# Patient Record
Sex: MALE | Race: WHITE | NOT HISPANIC OR LATINO | URBAN - METROPOLITAN AREA
[De-identification: names, ages, dates, MRNs, and addresses within clinical notes are randomized per-mention and may not be internally consistent; named-entity substitution may affect disease eponyms.]

---

## 2019-01-01 ENCOUNTER — ANESTHESIA EVENT (OUTPATIENT)
Dept: ENDOSCOPY | Facility: HOSPITAL | Age: 54
DRG: 374 | End: 2019-01-01
Payer: MEDICAID

## 2019-01-01 ENCOUNTER — ANESTHESIA (OUTPATIENT)
Dept: ENDOSCOPY | Facility: HOSPITAL | Age: 54
DRG: 374 | End: 2019-01-01
Payer: MEDICAID

## 2019-01-01 ENCOUNTER — PATIENT MESSAGE (OUTPATIENT)
Dept: HEMATOLOGY/ONCOLOGY | Facility: CLINIC | Age: 54
End: 2019-01-01

## 2019-01-01 ENCOUNTER — TELEPHONE (OUTPATIENT)
Dept: INTERVENTIONAL RADIOLOGY/VASCULAR | Facility: HOSPITAL | Age: 54
End: 2019-01-01

## 2019-01-01 ENCOUNTER — HOSPITAL ENCOUNTER (INPATIENT)
Facility: HOSPITAL | Age: 54
LOS: 7 days | Discharge: HOME | DRG: 420 | End: 2019-06-27
Attending: EMERGENCY MEDICINE | Admitting: EMERGENCY MEDICINE
Payer: MEDICAID

## 2019-01-01 ENCOUNTER — OFFICE VISIT (OUTPATIENT)
Dept: HEMATOLOGY/ONCOLOGY | Facility: CLINIC | Age: 54
DRG: 374 | End: 2019-01-01
Payer: MEDICAID

## 2019-01-01 ENCOUNTER — TELEPHONE (OUTPATIENT)
Dept: HEMATOLOGY/ONCOLOGY | Facility: CLINIC | Age: 54
End: 2019-01-01

## 2019-01-01 ENCOUNTER — HOSPITAL ENCOUNTER (INPATIENT)
Facility: HOSPITAL | Age: 54
LOS: 3 days | DRG: 374 | End: 2019-07-05
Attending: EMERGENCY MEDICINE | Admitting: HOSPITALIST
Payer: MEDICAID

## 2019-01-01 ENCOUNTER — TELEPHONE (OUTPATIENT)
Dept: TRANSPLANT | Facility: CLINIC | Age: 54
End: 2019-01-01

## 2019-01-01 VITALS
WEIGHT: 178.81 LBS | BODY MASS INDEX: 26.48 KG/M2 | HEART RATE: 87 BPM | HEIGHT: 69 IN | SYSTOLIC BLOOD PRESSURE: 110 MMHG | TEMPERATURE: 98 F | RESPIRATION RATE: 18 BRPM | DIASTOLIC BLOOD PRESSURE: 68 MMHG

## 2019-01-01 VITALS
BODY MASS INDEX: 24.52 KG/M2 | HEART RATE: 82 BPM | OXYGEN SATURATION: 96 % | WEIGHT: 165.56 LBS | DIASTOLIC BLOOD PRESSURE: 53 MMHG | TEMPERATURE: 98 F | SYSTOLIC BLOOD PRESSURE: 97 MMHG | RESPIRATION RATE: 17 BRPM | HEIGHT: 69 IN

## 2019-01-01 VITALS
BODY MASS INDEX: 26.42 KG/M2 | OXYGEN SATURATION: 90 % | DIASTOLIC BLOOD PRESSURE: 50 MMHG | HEIGHT: 69 IN | HEART RATE: 71 BPM | TEMPERATURE: 91 F | WEIGHT: 178.38 LBS | RESPIRATION RATE: 26 BRPM | SYSTOLIC BLOOD PRESSURE: 72 MMHG

## 2019-01-01 DIAGNOSIS — G89.3 NEOPLASM RELATED PAIN: ICD-10-CM

## 2019-01-01 DIAGNOSIS — C78.7 LIVER METASTASES: ICD-10-CM

## 2019-01-01 DIAGNOSIS — K92.0 HEMATEMESIS WITH NAUSEA: ICD-10-CM

## 2019-01-01 DIAGNOSIS — D50.9 MICROCYTIC ANEMIA: ICD-10-CM

## 2019-01-01 DIAGNOSIS — D72.829 LEUKOCYTOSIS, UNSPECIFIED TYPE: ICD-10-CM

## 2019-01-01 DIAGNOSIS — C22.1 CHOLANGIOCARCINOMA: ICD-10-CM

## 2019-01-01 DIAGNOSIS — E87.5 ACUTE HYPERKALEMIA: ICD-10-CM

## 2019-01-01 DIAGNOSIS — K92.0 HEMATEMESIS: Primary | ICD-10-CM

## 2019-01-01 DIAGNOSIS — R74.8 ELEVATED LIVER ENZYMES: ICD-10-CM

## 2019-01-01 DIAGNOSIS — C22.0 HCC (HEPATOCELLULAR CARCINOMA): Primary | ICD-10-CM

## 2019-01-01 DIAGNOSIS — I95.9 HYPOTENSION: ICD-10-CM

## 2019-01-01 DIAGNOSIS — R16.0 LIVER MASSES: ICD-10-CM

## 2019-01-01 DIAGNOSIS — J96.01 ACUTE HYPOXEMIC RESPIRATORY FAILURE: ICD-10-CM

## 2019-01-01 DIAGNOSIS — N17.9 AKI (ACUTE KIDNEY INJURY): ICD-10-CM

## 2019-01-01 DIAGNOSIS — C22.0 HCC (HEPATOCELLULAR CARCINOMA): ICD-10-CM

## 2019-01-01 DIAGNOSIS — R79.89 ELEVATED LFTS: ICD-10-CM

## 2019-01-01 DIAGNOSIS — E87.20 METABOLIC ACIDOSIS: ICD-10-CM

## 2019-01-01 DIAGNOSIS — A41.50 SEPSIS DUE TO GRAM NEGATIVE BACTERIA: ICD-10-CM

## 2019-01-01 DIAGNOSIS — C79.9 METASTATIC ADENOCARCINOMA: ICD-10-CM

## 2019-01-01 DIAGNOSIS — E88.09 HYPOALBUMINEMIA: ICD-10-CM

## 2019-01-01 DIAGNOSIS — K75.9 HEPATITIS: ICD-10-CM

## 2019-01-01 DIAGNOSIS — E80.6 HYPERBILIRUBINEMIA: ICD-10-CM

## 2019-01-01 DIAGNOSIS — D50.9 IRON DEFICIENCY ANEMIA, UNSPECIFIED IRON DEFICIENCY ANEMIA TYPE: ICD-10-CM

## 2019-01-01 DIAGNOSIS — E87.5 HYPERKALEMIA: ICD-10-CM

## 2019-01-01 DIAGNOSIS — C80.1 ADENOCARCINOMA: ICD-10-CM

## 2019-01-01 DIAGNOSIS — C79.9 METASTATIC CANCER: Primary | ICD-10-CM

## 2019-01-01 DIAGNOSIS — K59.01 SLOW TRANSIT CONSTIPATION: ICD-10-CM

## 2019-01-01 DIAGNOSIS — R57.9 SHOCK: ICD-10-CM

## 2019-01-01 DIAGNOSIS — E79.0 HYPERURICEMIA: ICD-10-CM

## 2019-01-01 DIAGNOSIS — E44.1 MILD PROTEIN MALNUTRITION: ICD-10-CM

## 2019-01-01 DIAGNOSIS — C18.9 ADENOCARCINOMA OF COLON: ICD-10-CM

## 2019-01-01 DIAGNOSIS — R06.02 SOB (SHORTNESS OF BREATH): ICD-10-CM

## 2019-01-01 DIAGNOSIS — G93.41 ACUTE METABOLIC ENCEPHALOPATHY: ICD-10-CM

## 2019-01-01 DIAGNOSIS — D62 ANEMIA DUE TO BLOOD LOSS, ACUTE: ICD-10-CM

## 2019-01-01 DIAGNOSIS — D62 ACUTE POST-HEMORRHAGIC ANEMIA: ICD-10-CM

## 2019-01-01 LAB
ABO + RH BLD: NORMAL
AFP SERPL-MCNC: 974 NG/ML (ref 0–8.4)
ALBUMIN SERPL BCP-MCNC: 1.3 G/DL (ref 3.5–5.2)
ALBUMIN SERPL BCP-MCNC: 1.3 G/DL (ref 3.5–5.2)
ALBUMIN SERPL BCP-MCNC: 1.4 G/DL (ref 3.5–5.2)
ALBUMIN SERPL BCP-MCNC: 1.5 G/DL (ref 3.5–5.2)
ALBUMIN SERPL BCP-MCNC: 2.1 G/DL (ref 3.5–5.2)
ALBUMIN SERPL BCP-MCNC: 2.2 G/DL (ref 3.5–5.2)
ALBUMIN SERPL BCP-MCNC: 2.2 G/DL (ref 3.5–5.2)
ALBUMIN SERPL BCP-MCNC: 2.3 G/DL (ref 3.5–5.2)
ALBUMIN SERPL BCP-MCNC: 2.4 G/DL (ref 3.5–5.2)
ALBUMIN SERPL BCP-MCNC: 2.5 G/DL (ref 3.5–5.2)
ALBUMIN SERPL BCP-MCNC: 2.7 G/DL (ref 3.5–5.2)
ALLENS TEST: ABNORMAL
ALP SERPL-CCNC: 1023 U/L (ref 55–135)
ALP SERPL-CCNC: 449 U/L (ref 55–135)
ALP SERPL-CCNC: 456 U/L (ref 55–135)
ALP SERPL-CCNC: 467 U/L (ref 55–135)
ALP SERPL-CCNC: 474 U/L (ref 55–135)
ALP SERPL-CCNC: 480 U/L (ref 55–135)
ALP SERPL-CCNC: 509 U/L (ref 55–135)
ALP SERPL-CCNC: 519 U/L (ref 55–135)
ALP SERPL-CCNC: 520 U/L (ref 55–135)
ALP SERPL-CCNC: 525 U/L (ref 55–135)
ALP SERPL-CCNC: 550 U/L (ref 55–135)
ALP SERPL-CCNC: 643 U/L (ref 55–135)
ALP SERPL-CCNC: 659 U/L (ref 55–135)
ALP SERPL-CCNC: 766 U/L (ref 55–135)
ALT SERPL W/O P-5'-P-CCNC: 114 U/L (ref 10–44)
ALT SERPL W/O P-5'-P-CCNC: 1158 U/L (ref 10–44)
ALT SERPL W/O P-5'-P-CCNC: 119 U/L (ref 10–44)
ALT SERPL W/O P-5'-P-CCNC: 124 U/L (ref 10–44)
ALT SERPL W/O P-5'-P-CCNC: 131 U/L (ref 10–44)
ALT SERPL W/O P-5'-P-CCNC: 1482 U/L (ref 10–44)
ALT SERPL W/O P-5'-P-CCNC: 181 U/L (ref 10–44)
ALT SERPL W/O P-5'-P-CCNC: 1903 U/L (ref 10–44)
ALT SERPL W/O P-5'-P-CCNC: 2125 U/L (ref 10–44)
ALT SERPL W/O P-5'-P-CCNC: 2255 U/L (ref 10–44)
ALT SERPL W/O P-5'-P-CCNC: 370 U/L (ref 10–44)
ALT SERPL W/O P-5'-P-CCNC: 432 U/L (ref 10–44)
ALT SERPL W/O P-5'-P-CCNC: 504 U/L (ref 10–44)
ALT SERPL W/O P-5'-P-CCNC: 506 U/L (ref 10–44)
AMMONIA PLAS-SCNC: 279 UMOL/L (ref 10–50)
ANION GAP SERPL CALC-SCNC: 10 MMOL/L (ref 8–16)
ANION GAP SERPL CALC-SCNC: 15 MMOL/L (ref 8–16)
ANION GAP SERPL CALC-SCNC: 15 MMOL/L (ref 8–16)
ANION GAP SERPL CALC-SCNC: 21 MMOL/L (ref 8–16)
ANION GAP SERPL CALC-SCNC: 22 MMOL/L (ref 8–16)
ANION GAP SERPL CALC-SCNC: 23 MMOL/L (ref 8–16)
ANION GAP SERPL CALC-SCNC: 24 MMOL/L (ref 8–16)
ANION GAP SERPL CALC-SCNC: 25 MMOL/L (ref 8–16)
ANION GAP SERPL CALC-SCNC: 7 MMOL/L (ref 8–16)
ANION GAP SERPL CALC-SCNC: 8 MMOL/L (ref 8–16)
ANION GAP SERPL CALC-SCNC: 9 MMOL/L (ref 8–16)
ANISOCYTOSIS BLD QL SMEAR: SLIGHT
AST SERPL-CCNC: 134 U/L (ref 10–40)
AST SERPL-CCNC: 140 U/L (ref 10–40)
AST SERPL-CCNC: 141 U/L (ref 10–40)
AST SERPL-CCNC: 141 U/L (ref 10–40)
AST SERPL-CCNC: 221 U/L (ref 10–40)
AST SERPL-CCNC: 3860 U/L (ref 10–40)
AST SERPL-CCNC: 5421 U/L (ref 10–40)
AST SERPL-CCNC: 573 U/L (ref 10–40)
AST SERPL-CCNC: 574 U/L (ref 10–40)
AST SERPL-CCNC: 592 U/L (ref 10–40)
AST SERPL-CCNC: 7422 U/L (ref 10–40)
AST SERPL-CCNC: 773 U/L (ref 10–40)
AST SERPL-CCNC: 8244 U/L (ref 10–40)
AST SERPL-CCNC: 9113 U/L (ref 10–40)
BACTERIA #/AREA URNS AUTO: ABNORMAL /HPF
BACTERIA #/AREA URNS AUTO: NORMAL /HPF
BACTERIA BLD CULT: NORMAL
BASOPHILS # BLD AUTO: 0.01 K/UL (ref 0–0.2)
BASOPHILS # BLD AUTO: 0.01 K/UL (ref 0–0.2)
BASOPHILS # BLD AUTO: 0.03 K/UL (ref 0–0.2)
BASOPHILS # BLD AUTO: 0.04 K/UL (ref 0–0.2)
BASOPHILS # BLD AUTO: 0.05 K/UL (ref 0–0.2)
BASOPHILS # BLD AUTO: 0.06 K/UL (ref 0–0.2)
BASOPHILS # BLD AUTO: 0.07 K/UL (ref 0–0.2)
BASOPHILS NFR BLD: 0.1 % (ref 0–1.9)
BASOPHILS NFR BLD: 0.1 % (ref 0–1.9)
BASOPHILS NFR BLD: 0.2 % (ref 0–1.9)
BASOPHILS NFR BLD: 0.3 % (ref 0–1.9)
BASOPHILS NFR BLD: 0.4 % (ref 0–1.9)
BASOPHILS NFR BLD: 0.5 % (ref 0–1.9)
BILIRUB DIRECT SERPL-MCNC: 4 MG/DL (ref 0.1–0.3)
BILIRUB DIRECT SERPL-MCNC: 4.5 MG/DL (ref 0.1–0.3)
BILIRUB DIRECT SERPL-MCNC: 4.5 MG/DL (ref 0.1–0.3)
BILIRUB DIRECT SERPL-MCNC: 4.6 MG/DL (ref 0.1–0.3)
BILIRUB SERPL-MCNC: 0.7 MG/DL (ref 0.1–1)
BILIRUB SERPL-MCNC: 0.9 MG/DL (ref 0.1–1)
BILIRUB SERPL-MCNC: 0.9 MG/DL (ref 0.1–1)
BILIRUB SERPL-MCNC: 1 MG/DL (ref 0.1–1)
BILIRUB SERPL-MCNC: 1.1 MG/DL (ref 0.1–1)
BILIRUB SERPL-MCNC: 1.4 MG/DL (ref 0.1–1)
BILIRUB SERPL-MCNC: 4.7 MG/DL (ref 0.1–1)
BILIRUB SERPL-MCNC: 5.2 MG/DL (ref 0.1–1)
BILIRUB SERPL-MCNC: 5.8 MG/DL (ref 0.1–1)
BILIRUB SERPL-MCNC: 6.4 MG/DL (ref 0.1–1)
BILIRUB SERPL-MCNC: 6.8 MG/DL (ref 0.1–1)
BILIRUB SERPL-MCNC: 7.2 MG/DL (ref 0.1–1)
BILIRUB UR QL STRIP: NEGATIVE
BLD GP AB SCN CELLS X3 SERPL QL: NORMAL
BLD PROD TYP BPU: NORMAL
BLOOD UNIT EXPIRATION DATE: NORMAL
BLOOD UNIT TYPE CODE: 7300
BLOOD UNIT TYPE: NORMAL
BNP SERPL-MCNC: 97 PG/ML (ref 0–99)
BUN SERPL-MCNC: 10 MG/DL (ref 6–20)
BUN SERPL-MCNC: 13 MG/DL (ref 6–20)
BUN SERPL-MCNC: 14 MG/DL (ref 6–20)
BUN SERPL-MCNC: 20 MG/DL (ref 6–20)
BUN SERPL-MCNC: 31 MG/DL (ref 6–20)
BUN SERPL-MCNC: 33 MG/DL (ref 6–20)
BUN SERPL-MCNC: 39 MG/DL (ref 6–20)
BUN SERPL-MCNC: 40 MG/DL (ref 6–20)
BUN SERPL-MCNC: 45 MG/DL (ref 6–20)
BUN SERPL-MCNC: 45 MG/DL (ref 6–20)
BUN SERPL-MCNC: 46 MG/DL (ref 6–20)
BUN SERPL-MCNC: 48 MG/DL (ref 6–20)
BUN SERPL-MCNC: 48 MG/DL (ref 6–20)
BUN SERPL-MCNC: 9 MG/DL (ref 6–20)
BURR CELLS BLD QL SMEAR: ABNORMAL
CALCIUM SERPL-MCNC: 7.1 MG/DL (ref 8.7–10.5)
CALCIUM SERPL-MCNC: 7.3 MG/DL (ref 8.7–10.5)
CALCIUM SERPL-MCNC: 7.4 MG/DL (ref 8.7–10.5)
CALCIUM SERPL-MCNC: 7.7 MG/DL (ref 8.7–10.5)
CALCIUM SERPL-MCNC: 7.7 MG/DL (ref 8.7–10.5)
CALCIUM SERPL-MCNC: 7.9 MG/DL (ref 8.7–10.5)
CALCIUM SERPL-MCNC: 7.9 MG/DL (ref 8.7–10.5)
CALCIUM SERPL-MCNC: 8.1 MG/DL (ref 8.7–10.5)
CALCIUM SERPL-MCNC: 8.3 MG/DL (ref 8.7–10.5)
CALCIUM SERPL-MCNC: 8.3 MG/DL (ref 8.7–10.5)
CALCIUM SERPL-MCNC: 8.4 MG/DL (ref 8.7–10.5)
CALCIUM SERPL-MCNC: 8.4 MG/DL (ref 8.7–10.5)
CALCIUM SERPL-MCNC: 8.5 MG/DL (ref 8.7–10.5)
CALCIUM SERPL-MCNC: 8.5 MG/DL (ref 8.7–10.5)
CALCIUM SERPL-MCNC: 8.6 MG/DL (ref 8.7–10.5)
CALCIUM SERPL-MCNC: 8.9 MG/DL (ref 8.7–10.5)
CALCIUM SERPL-MCNC: 9 MG/DL (ref 8.7–10.5)
CANCER AG19-9 SERPL-ACNC: ABNORMAL U/ML (ref 2–40)
CEA SERPL-MCNC: 158.3 NG/ML (ref 0–5)
CHLORIDE SERPL-SCNC: 100 MMOL/L (ref 95–110)
CHLORIDE SERPL-SCNC: 100 MMOL/L (ref 95–110)
CHLORIDE SERPL-SCNC: 101 MMOL/L (ref 95–110)
CHLORIDE SERPL-SCNC: 101 MMOL/L (ref 95–110)
CHLORIDE SERPL-SCNC: 102 MMOL/L (ref 95–110)
CHLORIDE SERPL-SCNC: 103 MMOL/L (ref 95–110)
CHLORIDE SERPL-SCNC: 103 MMOL/L (ref 95–110)
CHLORIDE SERPL-SCNC: 107 MMOL/L (ref 95–110)
CHLORIDE SERPL-SCNC: 90 MMOL/L (ref 95–110)
CHLORIDE SERPL-SCNC: 90 MMOL/L (ref 95–110)
CHLORIDE SERPL-SCNC: 92 MMOL/L (ref 95–110)
CHLORIDE SERPL-SCNC: 93 MMOL/L (ref 95–110)
CHLORIDE SERPL-SCNC: 94 MMOL/L (ref 95–110)
CHLORIDE SERPL-SCNC: 94 MMOL/L (ref 95–110)
CHLORIDE SERPL-SCNC: 96 MMOL/L (ref 95–110)
CHLORIDE SERPL-SCNC: 96 MMOL/L (ref 95–110)
CHLORIDE SERPL-SCNC: 97 MMOL/L (ref 95–110)
CK SERPL-CCNC: 982 U/L (ref 20–200)
CLARITY UR REFRACT.AUTO: ABNORMAL
CLARITY UR REFRACT.AUTO: ABNORMAL
CLARITY UR REFRACT.AUTO: CLEAR
CLARITY UR REFRACT.AUTO: CLEAR
CO2 SERPL-SCNC: 10 MMOL/L (ref 23–29)
CO2 SERPL-SCNC: 11 MMOL/L (ref 23–29)
CO2 SERPL-SCNC: 13 MMOL/L (ref 23–29)
CO2 SERPL-SCNC: 14 MMOL/L (ref 23–29)
CO2 SERPL-SCNC: 19 MMOL/L (ref 23–29)
CO2 SERPL-SCNC: 23 MMOL/L (ref 23–29)
CO2 SERPL-SCNC: 23 MMOL/L (ref 23–29)
CO2 SERPL-SCNC: 24 MMOL/L (ref 23–29)
CO2 SERPL-SCNC: 24 MMOL/L (ref 23–29)
CO2 SERPL-SCNC: 25 MMOL/L (ref 23–29)
CO2 SERPL-SCNC: 27 MMOL/L (ref 23–29)
CO2 SERPL-SCNC: 27 MMOL/L (ref 23–29)
CO2 SERPL-SCNC: 6 MMOL/L (ref 23–29)
CO2 SERPL-SCNC: 7 MMOL/L (ref 23–29)
CO2 SERPL-SCNC: 7 MMOL/L (ref 23–29)
CODING SYSTEM: NORMAL
COLOR UR AUTO: ABNORMAL
COLOR UR AUTO: YELLOW
CREAT SERPL-MCNC: 0.7 MG/DL (ref 0.5–1.4)
CREAT SERPL-MCNC: 0.8 MG/DL (ref 0.5–1.4)
CREAT SERPL-MCNC: 0.9 MG/DL (ref 0.5–1.4)
CREAT SERPL-MCNC: 1.1 MG/DL (ref 0.5–1.4)
CREAT SERPL-MCNC: 1.4 MG/DL (ref 0.5–1.4)
CREAT SERPL-MCNC: 2.3 MG/DL (ref 0.5–1.4)
CREAT SERPL-MCNC: 2.3 MG/DL (ref 0.5–1.4)
CREAT SERPL-MCNC: 2.4 MG/DL (ref 0.5–1.4)
CREAT SERPL-MCNC: 2.5 MG/DL (ref 0.5–1.4)
CREAT SERPL-MCNC: 2.5 MG/DL (ref 0.5–1.4)
CREAT SERPL-MCNC: 2.6 MG/DL (ref 0.5–1.4)
CREAT SERPL-MCNC: 2.7 MG/DL (ref 0.5–1.4)
CREAT UR-MCNC: 60 MG/DL (ref 23–375)
D DIMER PPP IA.FEU-MCNC: 21.94 MG/L FEU
DELSYS: ABNORMAL
DIFFERENTIAL METHOD: ABNORMAL
DISPENSE STATUS: NORMAL
EOSINOPHIL # BLD AUTO: 0 K/UL (ref 0–0.5)
EOSINOPHIL # BLD AUTO: 0.1 K/UL (ref 0–0.5)
EOSINOPHIL # BLD AUTO: 0.2 K/UL (ref 0–0.5)
EOSINOPHIL # BLD AUTO: 0.2 K/UL (ref 0–0.5)
EOSINOPHIL # BLD AUTO: 0.3 K/UL (ref 0–0.5)
EOSINOPHIL NFR BLD: 0 % (ref 0–8)
EOSINOPHIL NFR BLD: 0.1 % (ref 0–8)
EOSINOPHIL NFR BLD: 0.2 % (ref 0–8)
EOSINOPHIL NFR BLD: 0.3 % (ref 0–8)
EOSINOPHIL NFR BLD: 0.6 % (ref 0–8)
EOSINOPHIL NFR BLD: 0.6 % (ref 0–8)
EOSINOPHIL NFR BLD: 0.8 % (ref 0–8)
EOSINOPHIL NFR BLD: 1.7 % (ref 0–8)
EOSINOPHIL NFR BLD: 2 % (ref 0–8)
EOSINOPHIL NFR BLD: 2.5 % (ref 0–8)
EOSINOPHIL URNS QL WRIGHT STN: NORMAL
ERYTHROCYTE [DISTWIDTH] IN BLOOD BY AUTOMATED COUNT: 15.3 % (ref 11.5–14.5)
ERYTHROCYTE [DISTWIDTH] IN BLOOD BY AUTOMATED COUNT: 15.3 % (ref 11.5–14.5)
ERYTHROCYTE [DISTWIDTH] IN BLOOD BY AUTOMATED COUNT: 15.4 % (ref 11.5–14.5)
ERYTHROCYTE [DISTWIDTH] IN BLOOD BY AUTOMATED COUNT: 15.5 % (ref 11.5–14.5)
ERYTHROCYTE [DISTWIDTH] IN BLOOD BY AUTOMATED COUNT: 15.5 % (ref 11.5–14.5)
ERYTHROCYTE [DISTWIDTH] IN BLOOD BY AUTOMATED COUNT: 15.6 % (ref 11.5–14.5)
ERYTHROCYTE [DISTWIDTH] IN BLOOD BY AUTOMATED COUNT: 15.7 % (ref 11.5–14.5)
ERYTHROCYTE [DISTWIDTH] IN BLOOD BY AUTOMATED COUNT: 15.7 % (ref 11.5–14.5)
ERYTHROCYTE [DISTWIDTH] IN BLOOD BY AUTOMATED COUNT: 17.2 % (ref 11.5–14.5)
ERYTHROCYTE [DISTWIDTH] IN BLOOD BY AUTOMATED COUNT: 17.3 % (ref 11.5–14.5)
ERYTHROCYTE [DISTWIDTH] IN BLOOD BY AUTOMATED COUNT: 17.4 % (ref 11.5–14.5)
ERYTHROCYTE [DISTWIDTH] IN BLOOD BY AUTOMATED COUNT: 17.5 % (ref 11.5–14.5)
ERYTHROCYTE [DISTWIDTH] IN BLOOD BY AUTOMATED COUNT: 17.7 % (ref 11.5–14.5)
ERYTHROCYTE [DISTWIDTH] IN BLOOD BY AUTOMATED COUNT: 17.8 % (ref 11.5–14.5)
ERYTHROCYTE [DISTWIDTH] IN BLOOD BY AUTOMATED COUNT: 19.4 % (ref 11.5–14.5)
ERYTHROCYTE [DISTWIDTH] IN BLOOD BY AUTOMATED COUNT: 19.7 % (ref 11.5–14.5)
ERYTHROCYTE [SEDIMENTATION RATE] IN BLOOD BY WESTERGREN METHOD: 26 MM/H
EST. GFR  (AFRICAN AMERICAN): 30 ML/MIN/1.73 M^2
EST. GFR  (AFRICAN AMERICAN): 31.4 ML/MIN/1.73 M^2
EST. GFR  (AFRICAN AMERICAN): 32.9 ML/MIN/1.73 M^2
EST. GFR  (AFRICAN AMERICAN): 32.9 ML/MIN/1.73 M^2
EST. GFR  (AFRICAN AMERICAN): 34.6 ML/MIN/1.73 M^2
EST. GFR  (AFRICAN AMERICAN): 36.4 ML/MIN/1.73 M^2
EST. GFR  (AFRICAN AMERICAN): 36.4 ML/MIN/1.73 M^2
EST. GFR  (AFRICAN AMERICAN): >60 ML/MIN/1.73 M^2
EST. GFR  (NON AFRICAN AMERICAN): 25.9 ML/MIN/1.73 M^2
EST. GFR  (NON AFRICAN AMERICAN): 27.1 ML/MIN/1.73 M^2
EST. GFR  (NON AFRICAN AMERICAN): 28.5 ML/MIN/1.73 M^2
EST. GFR  (NON AFRICAN AMERICAN): 28.5 ML/MIN/1.73 M^2
EST. GFR  (NON AFRICAN AMERICAN): 29.9 ML/MIN/1.73 M^2
EST. GFR  (NON AFRICAN AMERICAN): 31.5 ML/MIN/1.73 M^2
EST. GFR  (NON AFRICAN AMERICAN): 31.5 ML/MIN/1.73 M^2
EST. GFR  (NON AFRICAN AMERICAN): 57.4 ML/MIN/1.73 M^2
EST. GFR  (NON AFRICAN AMERICAN): >60 ML/MIN/1.73 M^2
FERRITIN SERPL-MCNC: 120 NG/ML (ref 20–300)
FIBRINOGEN PPP-MCNC: <70 MG/DL (ref 182–366)
FIO2: 60
GGT SERPL-CCNC: 344 U/L (ref 8–55)
GLUCOSE SERPL-MCNC: 161 MG/DL (ref 70–110)
GLUCOSE SERPL-MCNC: 178 MG/DL (ref 70–110)
GLUCOSE SERPL-MCNC: 227 MG/DL (ref 70–110)
GLUCOSE SERPL-MCNC: 264 MG/DL (ref 70–110)
GLUCOSE SERPL-MCNC: 305 MG/DL (ref 70–110)
GLUCOSE SERPL-MCNC: 319 MG/DL (ref 70–110)
GLUCOSE SERPL-MCNC: 35 MG/DL (ref 70–110)
GLUCOSE SERPL-MCNC: 37 MG/DL (ref 70–110)
GLUCOSE SERPL-MCNC: 69 MG/DL (ref 70–110)
GLUCOSE SERPL-MCNC: 75 MG/DL (ref 70–110)
GLUCOSE SERPL-MCNC: 77 MG/DL (ref 70–110)
GLUCOSE SERPL-MCNC: 79 MG/DL (ref 70–110)
GLUCOSE SERPL-MCNC: 83 MG/DL (ref 70–110)
GLUCOSE SERPL-MCNC: 85 MG/DL (ref 70–110)
GLUCOSE SERPL-MCNC: 89 MG/DL (ref 70–110)
GLUCOSE SERPL-MCNC: 90 MG/DL (ref 70–110)
GLUCOSE SERPL-MCNC: 91 MG/DL (ref 70–110)
GLUCOSE SERPL-MCNC: 93 MG/DL (ref 70–110)
GLUCOSE UR QL STRIP: ABNORMAL
GLUCOSE UR QL STRIP: NEGATIVE
HAV IGM SERPL QL IA: NEGATIVE
HBV CORE IGM SERPL QL IA: NEGATIVE
HBV SURFACE AG SERPL QL IA: NEGATIVE
HCO3 UR-SCNC: 15.7 MMOL/L (ref 24–28)
HCO3 UR-SCNC: 16.6 MMOL/L (ref 24–28)
HCO3 UR-SCNC: 7.4 MMOL/L (ref 24–28)
HCT VFR BLD AUTO: 25.5 % (ref 40–54)
HCT VFR BLD AUTO: 26.5 % (ref 40–54)
HCT VFR BLD AUTO: 26.9 % (ref 40–54)
HCT VFR BLD AUTO: 28.2 % (ref 40–54)
HCT VFR BLD AUTO: 28.3 % (ref 40–54)
HCT VFR BLD AUTO: 28.4 % (ref 40–54)
HCT VFR BLD AUTO: 28.5 % (ref 40–54)
HCT VFR BLD AUTO: 29.2 % (ref 40–54)
HCT VFR BLD AUTO: 30.3 % (ref 40–54)
HCT VFR BLD AUTO: 30.5 % (ref 40–54)
HCT VFR BLD AUTO: 30.7 % (ref 40–54)
HCT VFR BLD AUTO: 31 % (ref 40–54)
HCT VFR BLD AUTO: 31 % (ref 40–54)
HCT VFR BLD AUTO: 31.1 % (ref 40–54)
HCT VFR BLD AUTO: 32.2 % (ref 40–54)
HCT VFR BLD AUTO: 32.3 % (ref 40–54)
HCT VFR BLD AUTO: 33 % (ref 40–54)
HCT VFR BLD AUTO: 34 % (ref 40–54)
HCT VFR BLD CALC: 32 %PCV (ref 36–54)
HCT VFR BLD CALC: 35 %PCV (ref 36–54)
HCV AB SERPL QL IA: NEGATIVE
HGB BLD-MCNC: 6.9 G/DL (ref 14–18)
HGB BLD-MCNC: 6.9 G/DL (ref 14–18)
HGB BLD-MCNC: 7 G/DL (ref 14–18)
HGB BLD-MCNC: 8.1 G/DL (ref 14–18)
HGB BLD-MCNC: 8.1 G/DL (ref 14–18)
HGB BLD-MCNC: 8.2 G/DL (ref 14–18)
HGB BLD-MCNC: 8.4 G/DL (ref 14–18)
HGB BLD-MCNC: 8.6 G/DL (ref 14–18)
HGB BLD-MCNC: 8.8 G/DL (ref 14–18)
HGB BLD-MCNC: 8.9 G/DL (ref 14–18)
HGB BLD-MCNC: 8.9 G/DL (ref 14–18)
HGB BLD-MCNC: 9 G/DL (ref 14–18)
HGB BLD-MCNC: 9.1 G/DL (ref 14–18)
HGB BLD-MCNC: 9.2 G/DL (ref 14–18)
HGB BLD-MCNC: 9.2 G/DL (ref 14–18)
HGB BLD-MCNC: 9.7 G/DL (ref 14–18)
HGB UR QL STRIP: ABNORMAL
HGB UR QL STRIP: NEGATIVE
HIV 1+2 AB+HIV1 P24 AG SERPL QL IA: NEGATIVE
HYALINE CASTS UR QL AUTO: 0 /LPF
HYALINE CASTS UR QL AUTO: 22 /LPF
HYPOCHROMIA BLD QL SMEAR: ABNORMAL
IMM GRANULOCYTES # BLD AUTO: 0.05 K/UL (ref 0–0.04)
IMM GRANULOCYTES # BLD AUTO: 0.06 K/UL (ref 0–0.04)
IMM GRANULOCYTES # BLD AUTO: 0.06 K/UL (ref 0–0.04)
IMM GRANULOCYTES # BLD AUTO: 0.07 K/UL (ref 0–0.04)
IMM GRANULOCYTES # BLD AUTO: 0.08 K/UL (ref 0–0.04)
IMM GRANULOCYTES # BLD AUTO: 0.09 K/UL (ref 0–0.04)
IMM GRANULOCYTES # BLD AUTO: 0.18 K/UL (ref 0–0.04)
IMM GRANULOCYTES # BLD AUTO: 0.22 K/UL (ref 0–0.04)
IMM GRANULOCYTES # BLD AUTO: 0.22 K/UL (ref 0–0.04)
IMM GRANULOCYTES # BLD AUTO: 0.34 K/UL (ref 0–0.04)
IMM GRANULOCYTES # BLD AUTO: 0.57 K/UL (ref 0–0.04)
IMM GRANULOCYTES # BLD AUTO: 0.6 K/UL (ref 0–0.04)
IMM GRANULOCYTES # BLD AUTO: 1.01 K/UL (ref 0–0.04)
IMM GRANULOCYTES # BLD AUTO: 1.14 K/UL (ref 0–0.04)
IMM GRANULOCYTES # BLD AUTO: 1.25 K/UL (ref 0–0.04)
IMM GRANULOCYTES # BLD AUTO: 1.27 K/UL (ref 0–0.04)
IMM GRANULOCYTES NFR BLD AUTO: 0.5 % (ref 0–0.5)
IMM GRANULOCYTES NFR BLD AUTO: 0.6 % (ref 0–0.5)
IMM GRANULOCYTES NFR BLD AUTO: 0.6 % (ref 0–0.5)
IMM GRANULOCYTES NFR BLD AUTO: 0.7 % (ref 0–0.5)
IMM GRANULOCYTES NFR BLD AUTO: 0.7 % (ref 0–0.5)
IMM GRANULOCYTES NFR BLD AUTO: 1.1 % (ref 0–0.5)
IMM GRANULOCYTES NFR BLD AUTO: 1.5 % (ref 0–0.5)
IMM GRANULOCYTES NFR BLD AUTO: 1.5 % (ref 0–0.5)
IMM GRANULOCYTES NFR BLD AUTO: 1.9 % (ref 0–0.5)
IMM GRANULOCYTES NFR BLD AUTO: 3.2 % (ref 0–0.5)
IMM GRANULOCYTES NFR BLD AUTO: 3.2 % (ref 0–0.5)
IMM GRANULOCYTES NFR BLD AUTO: 3.3 % (ref 0–0.5)
IMM GRANULOCYTES NFR BLD AUTO: 4 % (ref 0–0.5)
IMM GRANULOCYTES NFR BLD AUTO: 4.2 % (ref 0–0.5)
IMM GRANULOCYTES NFR BLD AUTO: 4.7 % (ref 0–0.5)
IMM GRANULOCYTES NFR BLD AUTO: 4.7 % (ref 0–0.5)
INR PPP: 1.1 (ref 0.8–1.2)
INR PPP: 1.3 (ref 0.8–1.2)
INR PPP: 1.8 (ref 0.8–1.2)
INR PPP: 3.7 (ref 0.8–1.2)
IRON SERPL-MCNC: 13 UG/DL (ref 45–160)
KETONES UR QL STRIP: ABNORMAL
KETONES UR QL STRIP: ABNORMAL
KETONES UR QL STRIP: NEGATIVE
KETONES UR QL STRIP: NEGATIVE
LACTATE SERPL-SCNC: 1.6 MMOL/L (ref 0.5–2.2)
LACTATE SERPL-SCNC: 1.7 MMOL/L (ref 0.5–2.2)
LACTATE SERPL-SCNC: >12 MMOL/L (ref 0.5–2.2)
LDH SERPL L TO P-CCNC: 17.51 MMOL/L (ref 0.36–1.25)
LDH SERPL L TO P-CCNC: 4398 U/L (ref 110–260)
LEUKOCYTE ESTERASE UR QL STRIP: NEGATIVE
LYMPHOCYTES # BLD AUTO: 0.6 K/UL (ref 1–4.8)
LYMPHOCYTES # BLD AUTO: 0.6 K/UL (ref 1–4.8)
LYMPHOCYTES # BLD AUTO: 0.7 K/UL (ref 1–4.8)
LYMPHOCYTES # BLD AUTO: 0.8 K/UL (ref 1–4.8)
LYMPHOCYTES # BLD AUTO: 0.9 K/UL (ref 1–4.8)
LYMPHOCYTES # BLD AUTO: 1 K/UL (ref 1–4.8)
LYMPHOCYTES # BLD AUTO: 1.1 K/UL (ref 1–4.8)
LYMPHOCYTES # BLD AUTO: 1.3 K/UL (ref 1–4.8)
LYMPHOCYTES NFR BLD: 1.9 % (ref 18–48)
LYMPHOCYTES NFR BLD: 10.6 % (ref 18–48)
LYMPHOCYTES NFR BLD: 10.6 % (ref 18–48)
LYMPHOCYTES NFR BLD: 10.7 % (ref 18–48)
LYMPHOCYTES NFR BLD: 11.2 % (ref 18–48)
LYMPHOCYTES NFR BLD: 11.5 % (ref 18–48)
LYMPHOCYTES NFR BLD: 18.1 % (ref 18–48)
LYMPHOCYTES NFR BLD: 2.6 % (ref 18–48)
LYMPHOCYTES NFR BLD: 2.9 % (ref 18–48)
LYMPHOCYTES NFR BLD: 3.3 % (ref 18–48)
LYMPHOCYTES NFR BLD: 4.3 % (ref 18–48)
LYMPHOCYTES NFR BLD: 4.4 % (ref 18–48)
LYMPHOCYTES NFR BLD: 4.9 % (ref 18–48)
LYMPHOCYTES NFR BLD: 5.4 % (ref 18–48)
LYMPHOCYTES NFR BLD: 5.5 % (ref 18–48)
LYMPHOCYTES NFR BLD: 5.7 % (ref 18–48)
LYMPHOCYTES NFR BLD: 6.7 % (ref 18–48)
LYMPHOCYTES NFR BLD: 8.3 % (ref 18–48)
MAGNESIUM SERPL-MCNC: 1.8 MG/DL (ref 1.6–2.6)
MAGNESIUM SERPL-MCNC: 1.9 MG/DL (ref 1.6–2.6)
MAGNESIUM SERPL-MCNC: 1.9 MG/DL (ref 1.6–2.6)
MAGNESIUM SERPL-MCNC: 2 MG/DL (ref 1.6–2.6)
MAGNESIUM SERPL-MCNC: 2 MG/DL (ref 1.6–2.6)
MAGNESIUM SERPL-MCNC: 2.1 MG/DL (ref 1.6–2.6)
MAGNESIUM SERPL-MCNC: 2.8 MG/DL (ref 1.6–2.6)
MAGNESIUM SERPL-MCNC: 2.9 MG/DL (ref 1.6–2.6)
MAGNESIUM SERPL-MCNC: 3 MG/DL (ref 1.6–2.6)
MAGNESIUM SERPL-MCNC: 3.2 MG/DL (ref 1.6–2.6)
MCH RBC QN AUTO: 20.5 PG (ref 27–31)
MCH RBC QN AUTO: 20.5 PG (ref 27–31)
MCH RBC QN AUTO: 20.8 PG (ref 27–31)
MCH RBC QN AUTO: 21 PG (ref 27–31)
MCH RBC QN AUTO: 21.1 PG (ref 27–31)
MCH RBC QN AUTO: 21.3 PG (ref 27–31)
MCH RBC QN AUTO: 21.5 PG (ref 27–31)
MCH RBC QN AUTO: 21.6 PG (ref 27–31)
MCH RBC QN AUTO: 21.6 PG (ref 27–31)
MCH RBC QN AUTO: 21.7 PG (ref 27–31)
MCH RBC QN AUTO: 21.7 PG (ref 27–31)
MCH RBC QN AUTO: 21.8 PG (ref 27–31)
MCH RBC QN AUTO: 22 PG (ref 27–31)
MCH RBC QN AUTO: 22 PG (ref 27–31)
MCH RBC QN AUTO: 22.1 PG (ref 27–31)
MCH RBC QN AUTO: 22.3 PG (ref 27–31)
MCHC RBC AUTO-ENTMCNC: 26 G/DL (ref 32–36)
MCHC RBC AUTO-ENTMCNC: 26 G/DL (ref 32–36)
MCHC RBC AUTO-ENTMCNC: 27.1 G/DL (ref 32–36)
MCHC RBC AUTO-ENTMCNC: 27.1 G/DL (ref 32–36)
MCHC RBC AUTO-ENTMCNC: 27.5 G/DL (ref 32–36)
MCHC RBC AUTO-ENTMCNC: 27.7 G/DL (ref 32–36)
MCHC RBC AUTO-ENTMCNC: 27.9 G/DL (ref 32–36)
MCHC RBC AUTO-ENTMCNC: 28.3 G/DL (ref 32–36)
MCHC RBC AUTO-ENTMCNC: 28.4 G/DL (ref 32–36)
MCHC RBC AUTO-ENTMCNC: 28.5 G/DL (ref 32–36)
MCHC RBC AUTO-ENTMCNC: 28.5 G/DL (ref 32–36)
MCHC RBC AUTO-ENTMCNC: 28.6 G/DL (ref 32–36)
MCHC RBC AUTO-ENTMCNC: 28.7 G/DL (ref 32–36)
MCHC RBC AUTO-ENTMCNC: 29 G/DL (ref 32–36)
MCHC RBC AUTO-ENTMCNC: 29 G/DL (ref 32–36)
MCHC RBC AUTO-ENTMCNC: 29.6 G/DL (ref 32–36)
MCHC RBC AUTO-ENTMCNC: 29.7 G/DL (ref 32–36)
MCHC RBC AUTO-ENTMCNC: 30.1 G/DL (ref 32–36)
MCV RBC AUTO: 71 FL (ref 82–98)
MCV RBC AUTO: 72 FL (ref 82–98)
MCV RBC AUTO: 73 FL (ref 82–98)
MCV RBC AUTO: 73 FL (ref 82–98)
MCV RBC AUTO: 74 FL (ref 82–98)
MCV RBC AUTO: 74 FL (ref 82–98)
MCV RBC AUTO: 75 FL (ref 82–98)
MCV RBC AUTO: 76 FL (ref 82–98)
MCV RBC AUTO: 77 FL (ref 82–98)
MCV RBC AUTO: 77 FL (ref 82–98)
MCV RBC AUTO: 78 FL (ref 82–98)
MCV RBC AUTO: 79 FL (ref 82–98)
MCV RBC AUTO: 79 FL (ref 82–98)
MCV RBC AUTO: 80 FL (ref 82–98)
MCV RBC AUTO: 80 FL (ref 82–98)
MCV RBC AUTO: 81 FL (ref 82–98)
MICROSCOPIC COMMENT: ABNORMAL
MICROSCOPIC COMMENT: NORMAL
MIN VOL: 9.76
MODE: ABNORMAL
MONOCYTES # BLD AUTO: 0.1 K/UL (ref 0.3–1)
MONOCYTES # BLD AUTO: 0.2 K/UL (ref 0.3–1)
MONOCYTES # BLD AUTO: 0.4 K/UL (ref 0.3–1)
MONOCYTES # BLD AUTO: 1.1 K/UL (ref 0.3–1)
MONOCYTES # BLD AUTO: 1.3 K/UL (ref 0.3–1)
MONOCYTES # BLD AUTO: 1.4 K/UL (ref 0.3–1)
MONOCYTES # BLD AUTO: 1.5 K/UL (ref 0.3–1)
MONOCYTES # BLD AUTO: 1.7 K/UL (ref 0.3–1)
MONOCYTES # BLD AUTO: 1.7 K/UL (ref 0.3–1)
MONOCYTES # BLD AUTO: 1.8 K/UL (ref 0.3–1)
MONOCYTES # BLD AUTO: 2 K/UL (ref 0.3–1)
MONOCYTES # BLD AUTO: 2.1 K/UL (ref 0.3–1)
MONOCYTES # BLD AUTO: 2.4 K/UL (ref 0.3–1)
MONOCYTES # BLD AUTO: 2.5 K/UL (ref 0.3–1)
MONOCYTES # BLD AUTO: 2.6 K/UL (ref 0.3–1)
MONOCYTES # BLD AUTO: 2.7 K/UL (ref 0.3–1)
MONOCYTES NFR BLD: 1.7 % (ref 4–15)
MONOCYTES NFR BLD: 10.2 % (ref 4–15)
MONOCYTES NFR BLD: 10.9 % (ref 4–15)
MONOCYTES NFR BLD: 11.4 % (ref 4–15)
MONOCYTES NFR BLD: 11.6 % (ref 4–15)
MONOCYTES NFR BLD: 11.9 % (ref 4–15)
MONOCYTES NFR BLD: 12.1 % (ref 4–15)
MONOCYTES NFR BLD: 12.6 % (ref 4–15)
MONOCYTES NFR BLD: 12.8 % (ref 4–15)
MONOCYTES NFR BLD: 13.3 % (ref 4–15)
MONOCYTES NFR BLD: 13.9 % (ref 4–15)
MONOCYTES NFR BLD: 2.4 % (ref 4–15)
MONOCYTES NFR BLD: 4.9 % (ref 4–15)
MONOCYTES NFR BLD: 5.4 % (ref 4–15)
MONOCYTES NFR BLD: 8.4 % (ref 4–15)
MONOCYTES NFR BLD: 8.7 % (ref 4–15)
MONOCYTES NFR BLD: 9.5 % (ref 4–15)
MONOCYTES NFR BLD: 9.8 % (ref 4–15)
NEUTROPHILS # BLD AUTO: 11.3 K/UL (ref 1.8–7.7)
NEUTROPHILS # BLD AUTO: 11.7 K/UL (ref 1.8–7.7)
NEUTROPHILS # BLD AUTO: 12.1 K/UL (ref 1.8–7.7)
NEUTROPHILS # BLD AUTO: 13 K/UL (ref 1.8–7.7)
NEUTROPHILS # BLD AUTO: 14.4 K/UL (ref 1.8–7.7)
NEUTROPHILS # BLD AUTO: 14.9 K/UL (ref 1.8–7.7)
NEUTROPHILS # BLD AUTO: 16.7 K/UL (ref 1.8–7.7)
NEUTROPHILS # BLD AUTO: 19.8 K/UL (ref 1.8–7.7)
NEUTROPHILS # BLD AUTO: 2.8 K/UL (ref 1.8–7.7)
NEUTROPHILS # BLD AUTO: 21.8 K/UL (ref 1.8–7.7)
NEUTROPHILS # BLD AUTO: 24.1 K/UL (ref 1.8–7.7)
NEUTROPHILS # BLD AUTO: 25.9 K/UL (ref 1.8–7.7)
NEUTROPHILS # BLD AUTO: 5.4 K/UL (ref 1.8–7.7)
NEUTROPHILS # BLD AUTO: 7.4 K/UL (ref 1.8–7.7)
NEUTROPHILS # BLD AUTO: 7.7 K/UL (ref 1.8–7.7)
NEUTROPHILS # BLD AUTO: 8.2 K/UL (ref 1.8–7.7)
NEUTROPHILS # BLD AUTO: 8.4 K/UL (ref 1.8–7.7)
NEUTROPHILS # BLD AUTO: 8.8 K/UL (ref 1.8–7.7)
NEUTROPHILS NFR BLD: 72.7 % (ref 38–73)
NEUTROPHILS NFR BLD: 72.8 % (ref 38–73)
NEUTROPHILS NFR BLD: 73.7 % (ref 38–73)
NEUTROPHILS NFR BLD: 74 % (ref 38–73)
NEUTROPHILS NFR BLD: 77.7 % (ref 38–73)
NEUTROPHILS NFR BLD: 78.1 % (ref 38–73)
NEUTROPHILS NFR BLD: 79.9 % (ref 38–73)
NEUTROPHILS NFR BLD: 80.2 % (ref 38–73)
NEUTROPHILS NFR BLD: 81 % (ref 38–73)
NEUTROPHILS NFR BLD: 81.9 % (ref 38–73)
NEUTROPHILS NFR BLD: 82.7 % (ref 38–73)
NEUTROPHILS NFR BLD: 84.4 % (ref 38–73)
NEUTROPHILS NFR BLD: 84.9 % (ref 38–73)
NEUTROPHILS NFR BLD: 85 % (ref 38–73)
NEUTROPHILS NFR BLD: 88.5 % (ref 38–73)
NEUTROPHILS NFR BLD: 90.1 % (ref 38–73)
NITRITE UR QL STRIP: NEGATIVE
NRBC BLD-RTO: 0 /100 WBC
NRBC BLD-RTO: 1 /100 WBC
NRBC BLD-RTO: 12 /100 WBC
NRBC BLD-RTO: 2 /100 WBC
NRBC BLD-RTO: 3 /100 WBC
NRBC BLD-RTO: 4 /100 WBC
NRBC BLD-RTO: 49 /100 WBC
NRBC BLD-RTO: 5 /100 WBC
NRBC BLD-RTO: 55 /100 WBC
OVALOCYTES BLD QL SMEAR: ABNORMAL
PCO2 BLDA: 26 MMHG (ref 35–45)
PCO2 BLDA: 44.2 MMHG (ref 35–45)
PCO2 BLDA: 48.3 MMHG (ref 35–45)
PEEP: 5
PH SMN: 7.06 [PH] (ref 7.35–7.45)
PH SMN: 7.14 [PH] (ref 7.35–7.45)
PH SMN: 7.16 [PH] (ref 7.35–7.45)
PH UR STRIP: 5 [PH] (ref 5–8)
PH UR STRIP: 5 [PH] (ref 5–8)
PH UR STRIP: 6 [PH] (ref 5–8)
PH UR STRIP: 6 [PH] (ref 5–8)
PHOSPHATE SERPL-MCNC: 2.7 MG/DL (ref 2.7–4.5)
PHOSPHATE SERPL-MCNC: 2.8 MG/DL (ref 2.7–4.5)
PHOSPHATE SERPL-MCNC: 2.9 MG/DL (ref 2.7–4.5)
PHOSPHATE SERPL-MCNC: 3.2 MG/DL (ref 2.7–4.5)
PHOSPHATE SERPL-MCNC: 3.6 MG/DL (ref 2.7–4.5)
PHOSPHATE SERPL-MCNC: 6.6 MG/DL (ref 2.7–4.5)
PHOSPHATE SERPL-MCNC: 7.7 MG/DL (ref 2.7–4.5)
PHOSPHATE SERPL-MCNC: 7.7 MG/DL (ref 2.7–4.5)
PHOSPHATE SERPL-MCNC: 7.9 MG/DL (ref 2.7–4.5)
PHOSPHATE SERPL-MCNC: 7.9 MG/DL (ref 2.7–4.5)
PHOSPHATE SERPL-MCNC: 8.2 MG/DL (ref 2.7–4.5)
PHOSPHATE SERPL-MCNC: 8.2 MG/DL (ref 2.7–4.5)
PIP: 27
PLATELET # BLD AUTO: 189 K/UL (ref 150–350)
PLATELET # BLD AUTO: 202 K/UL (ref 150–350)
PLATELET # BLD AUTO: 261 K/UL (ref 150–350)
PLATELET # BLD AUTO: 27 K/UL (ref 150–350)
PLATELET # BLD AUTO: 329 K/UL (ref 150–350)
PLATELET # BLD AUTO: 35 K/UL (ref 150–350)
PLATELET # BLD AUTO: 371 K/UL (ref 150–350)
PLATELET # BLD AUTO: 388 K/UL (ref 150–350)
PLATELET # BLD AUTO: 418 K/UL (ref 150–350)
PLATELET # BLD AUTO: 429 K/UL (ref 150–350)
PLATELET # BLD AUTO: 438 K/UL (ref 150–350)
PLATELET # BLD AUTO: 444 K/UL (ref 150–350)
PLATELET # BLD AUTO: 517 K/UL (ref 150–350)
PLATELET # BLD AUTO: 524 K/UL (ref 150–350)
PLATELET # BLD AUTO: 552 K/UL (ref 150–350)
PLATELET # BLD AUTO: 576 K/UL (ref 150–350)
PLATELET # BLD AUTO: 578 K/UL (ref 150–350)
PLATELET # BLD AUTO: 591 K/UL (ref 150–350)
PLATELET BLD QL SMEAR: ABNORMAL
PMV BLD AUTO: 10 FL (ref 9.2–12.9)
PMV BLD AUTO: 10.1 FL (ref 9.2–12.9)
PMV BLD AUTO: 10.2 FL (ref 9.2–12.9)
PMV BLD AUTO: 10.3 FL (ref 9.2–12.9)
PMV BLD AUTO: 10.5 FL (ref 9.2–12.9)
PMV BLD AUTO: 10.5 FL (ref 9.2–12.9)
PMV BLD AUTO: 10.9 FL (ref 9.2–12.9)
PMV BLD AUTO: 9.5 FL (ref 9.2–12.9)
PMV BLD AUTO: 9.6 FL (ref 9.2–12.9)
PMV BLD AUTO: 9.9 FL (ref 9.2–12.9)
PMV BLD AUTO: ABNORMAL FL (ref 9.2–12.9)
PMV BLD AUTO: ABNORMAL FL (ref 9.2–12.9)
PO2 BLDA: 141 MMHG (ref 80–100)
PO2 BLDA: 52 MMHG (ref 40–60)
PO2 BLDA: 53 MMHG (ref 40–60)
POC BE: -12 MMOL/L
POC BE: -13 MMOL/L
POC BE: -23 MMOL/L
POC IONIZED CALCIUM: 0.96 MMOL/L (ref 1.06–1.42)
POC IONIZED CALCIUM: 1.07 MMOL/L (ref 1.06–1.42)
POC SATURATED O2: 75 % (ref 95–100)
POC SATURATED O2: 77 % (ref 95–100)
POC SATURATED O2: 98 % (ref 95–100)
POC TCO2: 17 MMOL/L (ref 24–29)
POC TCO2: 18 MMOL/L (ref 24–29)
POC TCO2: 8 MMOL/L (ref 23–27)
POCT GLUCOSE: 109 MG/DL (ref 70–110)
POCT GLUCOSE: 166 MG/DL (ref 70–110)
POCT GLUCOSE: 167 MG/DL (ref 70–110)
POCT GLUCOSE: 170 MG/DL (ref 70–110)
POCT GLUCOSE: 175 MG/DL (ref 70–110)
POCT GLUCOSE: 178 MG/DL (ref 70–110)
POCT GLUCOSE: 183 MG/DL (ref 70–110)
POCT GLUCOSE: 204 MG/DL (ref 70–110)
POCT GLUCOSE: 240 MG/DL (ref 70–110)
POCT GLUCOSE: 262 MG/DL (ref 70–110)
POCT GLUCOSE: 282 MG/DL (ref 70–110)
POCT GLUCOSE: 298 MG/DL (ref 70–110)
POCT GLUCOSE: 303 MG/DL (ref 70–110)
POCT GLUCOSE: 309 MG/DL (ref 70–110)
POCT GLUCOSE: 317 MG/DL (ref 70–110)
POCT GLUCOSE: 337 MG/DL (ref 70–110)
POCT GLUCOSE: 338 MG/DL (ref 70–110)
POCT GLUCOSE: 354 MG/DL (ref 70–110)
POCT GLUCOSE: 372 MG/DL (ref 70–110)
POCT GLUCOSE: 396 MG/DL (ref 70–110)
POCT GLUCOSE: 43 MG/DL (ref 70–110)
POCT GLUCOSE: 53 MG/DL (ref 70–110)
POCT GLUCOSE: 63 MG/DL (ref 70–110)
POCT GLUCOSE: 65 MG/DL (ref 70–110)
POCT GLUCOSE: 75 MG/DL (ref 70–110)
POCT GLUCOSE: 78 MG/DL (ref 70–110)
POCT GLUCOSE: 79 MG/DL (ref 70–110)
POCT GLUCOSE: 81 MG/DL (ref 70–110)
POCT GLUCOSE: 96 MG/DL (ref 70–110)
POIKILOCYTOSIS BLD QL SMEAR: ABNORMAL
POIKILOCYTOSIS BLD QL SMEAR: SLIGHT
POLYCHROMASIA BLD QL SMEAR: ABNORMAL
POTASSIUM BLD-SCNC: 6.9 MMOL/L (ref 3.5–5.1)
POTASSIUM BLD-SCNC: 6.9 MMOL/L (ref 3.5–5.1)
POTASSIUM SERPL-SCNC: 4.3 MMOL/L (ref 3.5–5.1)
POTASSIUM SERPL-SCNC: 4.3 MMOL/L (ref 3.5–5.1)
POTASSIUM SERPL-SCNC: 4.4 MMOL/L (ref 3.5–5.1)
POTASSIUM SERPL-SCNC: 4.6 MMOL/L (ref 3.5–5.1)
POTASSIUM SERPL-SCNC: 4.6 MMOL/L (ref 3.5–5.1)
POTASSIUM SERPL-SCNC: 4.8 MMOL/L (ref 3.5–5.1)
POTASSIUM SERPL-SCNC: 4.9 MMOL/L (ref 3.5–5.1)
POTASSIUM SERPL-SCNC: 5.1 MMOL/L (ref 3.5–5.1)
POTASSIUM SERPL-SCNC: 5.5 MMOL/L (ref 3.5–5.1)
POTASSIUM SERPL-SCNC: 5.9 MMOL/L (ref 3.5–5.1)
POTASSIUM SERPL-SCNC: 6.3 MMOL/L (ref 3.5–5.1)
POTASSIUM SERPL-SCNC: 6.7 MMOL/L (ref 3.5–5.1)
POTASSIUM SERPL-SCNC: 6.9 MMOL/L (ref 3.5–5.1)
POTASSIUM SERPL-SCNC: 7.2 MMOL/L (ref 3.5–5.1)
POTASSIUM SERPL-SCNC: 8.2 MMOL/L (ref 3.5–5.1)
PREALB SERPL-MCNC: 5 MG/DL (ref 20–43)
PROCALCITONIN SERPL IA-MCNC: 1.64 NG/ML
PROT SERPL-MCNC: 3.3 G/DL (ref 6–8.4)
PROT SERPL-MCNC: 3.4 G/DL (ref 6–8.4)
PROT SERPL-MCNC: 3.6 G/DL (ref 6–8.4)
PROT SERPL-MCNC: 5.2 G/DL (ref 6–8.4)
PROT SERPL-MCNC: 5.4 G/DL (ref 6–8.4)
PROT SERPL-MCNC: 5.5 G/DL (ref 6–8.4)
PROT SERPL-MCNC: 5.6 G/DL (ref 6–8.4)
PROT SERPL-MCNC: 5.6 G/DL (ref 6–8.4)
PROT SERPL-MCNC: 5.8 G/DL (ref 6–8.4)
PROT SERPL-MCNC: 5.9 G/DL (ref 6–8.4)
PROT SERPL-MCNC: 6 G/DL (ref 6–8.4)
PROT SERPL-MCNC: 6.2 G/DL (ref 6–8.4)
PROT UR QL STRIP: ABNORMAL
PROT UR QL STRIP: ABNORMAL
PROT UR QL STRIP: NEGATIVE
PROT UR QL STRIP: NEGATIVE
PROTHROMBIN TIME: 11.4 SEC (ref 9–12.5)
PROTHROMBIN TIME: 13.1 SEC (ref 9–12.5)
PROTHROMBIN TIME: 17.3 SEC (ref 9–12.5)
PROTHROMBIN TIME: 35.9 SEC (ref 9–12.5)
PROVIDER CREDENTIALS: ABNORMAL
PROVIDER NOTIFIED: ABNORMAL
RBC # BLD AUTO: 3.29 M/UL (ref 4.6–6.2)
RBC # BLD AUTO: 3.31 M/UL (ref 4.6–6.2)
RBC # BLD AUTO: 3.36 M/UL (ref 4.6–6.2)
RBC # BLD AUTO: 3.72 M/UL (ref 4.6–6.2)
RBC # BLD AUTO: 3.76 M/UL (ref 4.6–6.2)
RBC # BLD AUTO: 3.8 M/UL (ref 4.6–6.2)
RBC # BLD AUTO: 3.84 M/UL (ref 4.6–6.2)
RBC # BLD AUTO: 3.85 M/UL (ref 4.6–6.2)
RBC # BLD AUTO: 3.88 M/UL (ref 4.6–6.2)
RBC # BLD AUTO: 4.09 M/UL (ref 4.6–6.2)
RBC # BLD AUTO: 4.1 M/UL (ref 4.6–6.2)
RBC # BLD AUTO: 4.14 M/UL (ref 4.6–6.2)
RBC # BLD AUTO: 4.18 M/UL (ref 4.6–6.2)
RBC # BLD AUTO: 4.19 M/UL (ref 4.6–6.2)
RBC # BLD AUTO: 4.21 M/UL (ref 4.6–6.2)
RBC # BLD AUTO: 4.28 M/UL (ref 4.6–6.2)
RBC # BLD AUTO: 4.4 M/UL (ref 4.6–6.2)
RBC # BLD AUTO: 4.48 M/UL (ref 4.6–6.2)
RBC #/AREA URNS AUTO: 1 /HPF (ref 0–4)
RBC #/AREA URNS AUTO: 5 /HPF (ref 0–4)
SAMPLE: ABNORMAL
SATURATED IRON: 3 % (ref 20–50)
SCHISTOCYTES BLD QL SMEAR: ABNORMAL
SCHISTOCYTES BLD QL SMEAR: PRESENT
SITE: ABNORMAL
SODIUM BLD-SCNC: 127 MMOL/L (ref 136–145)
SODIUM BLD-SCNC: 132 MMOL/L (ref 136–145)
SODIUM SERPL-SCNC: 124 MMOL/L (ref 136–145)
SODIUM SERPL-SCNC: 125 MMOL/L (ref 136–145)
SODIUM SERPL-SCNC: 126 MMOL/L (ref 136–145)
SODIUM SERPL-SCNC: 127 MMOL/L (ref 136–145)
SODIUM SERPL-SCNC: 127 MMOL/L (ref 136–145)
SODIUM SERPL-SCNC: 128 MMOL/L (ref 136–145)
SODIUM SERPL-SCNC: 131 MMOL/L (ref 136–145)
SODIUM SERPL-SCNC: 132 MMOL/L (ref 136–145)
SODIUM SERPL-SCNC: 133 MMOL/L (ref 136–145)
SODIUM SERPL-SCNC: 134 MMOL/L (ref 136–145)
SODIUM SERPL-SCNC: 136 MMOL/L (ref 136–145)
SODIUM SERPL-SCNC: 136 MMOL/L (ref 136–145)
SODIUM SERPL-SCNC: 137 MMOL/L (ref 136–145)
SODIUM SERPL-SCNC: 137 MMOL/L (ref 136–145)
SODIUM UR-SCNC: <20 MMOL/L (ref 20–250)
SP GR UR STRIP: 1.02 (ref 1–1.03)
SP GR UR STRIP: 1.03 (ref 1–1.03)
SP02: 100
SPHEROCYTES BLD QL SMEAR: ABNORMAL
SQUAMOUS #/AREA URNS AUTO: 12 /HPF
TOTAL IRON BINDING CAPACITY: 450 UG/DL (ref 250–450)
TRANS PLATPHERESIS VOL PATIENT: NORMAL ML
TRANSFERRIN SERPL-MCNC: 304 MG/DL (ref 200–375)
TROPONIN I SERPL DL<=0.01 NG/ML-MCNC: 0.01 NG/ML (ref 0–0.03)
URATE SERPL-MCNC: 15.3 MG/DL (ref 3.4–7)
URATE SERPL-MCNC: 7 MG/DL (ref 3.4–7)
URN SPEC COLLECT METH UR: ABNORMAL
UUN UR-MCNC: 153 MG/DL (ref 140–1050)
VERBAL RESULT READBACK PERFORMED: YES
VT: 350
WBC # BLD AUTO: 10.15 K/UL (ref 3.9–12.7)
WBC # BLD AUTO: 11.23 K/UL (ref 3.9–12.7)
WBC # BLD AUTO: 11.36 K/UL (ref 3.9–12.7)
WBC # BLD AUTO: 11.93 K/UL (ref 3.9–12.7)
WBC # BLD AUTO: 13.38 K/UL (ref 3.9–12.7)
WBC # BLD AUTO: 14.54 K/UL (ref 3.9–12.7)
WBC # BLD AUTO: 14.95 K/UL (ref 3.9–12.7)
WBC # BLD AUTO: 16.27 K/UL (ref 3.9–12.7)
WBC # BLD AUTO: 17.59 K/UL (ref 3.9–12.7)
WBC # BLD AUTO: 18.67 K/UL (ref 3.9–12.7)
WBC # BLD AUTO: 21.45 K/UL (ref 3.9–12.7)
WBC # BLD AUTO: 21.95 K/UL (ref 3.9–12.7)
WBC # BLD AUTO: 26.41 K/UL (ref 3.9–12.7)
WBC # BLD AUTO: 28.39 K/UL (ref 3.9–12.7)
WBC # BLD AUTO: 3.59 K/UL (ref 3.9–12.7)
WBC # BLD AUTO: 30.41 K/UL (ref 3.9–12.7)
WBC # BLD AUTO: 6.68 K/UL (ref 3.9–12.7)
WBC # BLD AUTO: 8.65 K/UL (ref 3.9–12.7)
WBC #/AREA URNS AUTO: 0 /HPF (ref 0–5)
WBC #/AREA URNS AUTO: 1 /HPF (ref 0–5)

## 2019-01-01 PROCEDURE — 25000003 PHARM REV CODE 250: Performed by: HOSPITALIST

## 2019-01-01 PROCEDURE — 82140 ASSAY OF AMMONIA: CPT

## 2019-01-01 PROCEDURE — 11000001 HC ACUTE MED/SURG PRIVATE ROOM

## 2019-01-01 PROCEDURE — 85025 COMPLETE CBC W/AUTO DIFF WBC: CPT | Mod: 91

## 2019-01-01 PROCEDURE — 99900035 HC TECH TIME PER 15 MIN (STAT)

## 2019-01-01 PROCEDURE — 85025 COMPLETE CBC W/AUTO DIFF WBC: CPT

## 2019-01-01 PROCEDURE — 94002 VENT MGMT INPAT INIT DAY: CPT

## 2019-01-01 PROCEDURE — 12000002 HC ACUTE/MED SURGE SEMI-PRIVATE ROOM

## 2019-01-01 PROCEDURE — 99223 PR INITIAL HOSPITAL CARE,LEVL III: ICD-10-PCS | Mod: ,,, | Performed by: INTERNAL MEDICINE

## 2019-01-01 PROCEDURE — 85610 PROTHROMBIN TIME: CPT

## 2019-01-01 PROCEDURE — 94640 AIRWAY INHALATION TREATMENT: CPT

## 2019-01-01 PROCEDURE — G0378 HOSPITAL OBSERVATION PER HR: HCPCS

## 2019-01-01 PROCEDURE — 99285 EMERGENCY DEPT VISIT HI MDM: CPT | Mod: ,,, | Performed by: EMERGENCY MEDICINE

## 2019-01-01 PROCEDURE — 99226 PR SUBSEQUENT OBSERVATION CARE,LEVEL III: ICD-10-PCS | Mod: ,,, | Performed by: HOSPITALIST

## 2019-01-01 PROCEDURE — 87205 SMEAR GRAM STAIN: CPT

## 2019-01-01 PROCEDURE — 99292 CRITICAL CARE ADDL 30 MIN: CPT | Mod: 25,,, | Performed by: CLINICAL NURSE SPECIALIST

## 2019-01-01 PROCEDURE — C9113 INJ PANTOPRAZOLE SODIUM, VIA: HCPCS | Performed by: PHYSICIAN ASSISTANT

## 2019-01-01 PROCEDURE — 84075 ASSAY ALKALINE PHOSPHATASE: CPT

## 2019-01-01 PROCEDURE — 83735 ASSAY OF MAGNESIUM: CPT

## 2019-01-01 PROCEDURE — 96361 HYDRATE IV INFUSION ADD-ON: CPT

## 2019-01-01 PROCEDURE — 83880 ASSAY OF NATRIURETIC PEPTIDE: CPT

## 2019-01-01 PROCEDURE — 99291 PR CRITICAL CARE, E/M 30-74 MINUTES: ICD-10-PCS | Mod: ,,, | Performed by: EMERGENCY MEDICINE

## 2019-01-01 PROCEDURE — 84100 ASSAY OF PHOSPHORUS: CPT

## 2019-01-01 PROCEDURE — 37799 UNLISTED PX VASCULAR SURGERY: CPT

## 2019-01-01 PROCEDURE — 80053 COMPREHEN METABOLIC PANEL: CPT

## 2019-01-01 PROCEDURE — 80069 RENAL FUNCTION PANEL: CPT

## 2019-01-01 PROCEDURE — 96375 TX/PRO/DX INJ NEW DRUG ADDON: CPT

## 2019-01-01 PROCEDURE — 99999 PR PBB SHADOW E&M-EST. PATIENT-LVL IV: CPT | Mod: PBBFAC,,, | Performed by: STUDENT IN AN ORGANIZED HEALTH CARE EDUCATION/TRAINING PROGRAM

## 2019-01-01 PROCEDURE — 88307 CYTOLOGY SPECIMEN-FNA-RADIOLOGY ASSISTED WITH PATH ADEQUACY-CORE BX: ICD-10-PCS | Mod: 26,,, | Performed by: PATHOLOGY

## 2019-01-01 PROCEDURE — D9220A PRA ANESTHESIA: Mod: ANES,,, | Performed by: ANESTHESIOLOGY

## 2019-01-01 PROCEDURE — 83615 LACTATE (LD) (LDH) ENZYME: CPT

## 2019-01-01 PROCEDURE — 83605 ASSAY OF LACTIC ACID: CPT | Mod: 91

## 2019-01-01 PROCEDURE — 25000003 PHARM REV CODE 250: Performed by: NURSE PRACTITIONER

## 2019-01-01 PROCEDURE — 63600175 PHARM REV CODE 636 W HCPCS: Performed by: GENERAL PRACTICE

## 2019-01-01 PROCEDURE — 63600175 PHARM REV CODE 636 W HCPCS: Performed by: HOSPITALIST

## 2019-01-01 PROCEDURE — 93005 ELECTROCARDIOGRAM TRACING: CPT | Performed by: INTERNAL MEDICINE

## 2019-01-01 PROCEDURE — 93010 EKG 12-LEAD: ICD-10-PCS | Mod: ,,, | Performed by: INTERNAL MEDICINE

## 2019-01-01 PROCEDURE — 86703 HIV-1/HIV-2 1 RESULT ANTBDY: CPT

## 2019-01-01 PROCEDURE — 87040 BLOOD CULTURE FOR BACTERIA: CPT | Mod: 59

## 2019-01-01 PROCEDURE — 25000003 PHARM REV CODE 250: Performed by: EMERGENCY MEDICINE

## 2019-01-01 PROCEDURE — 43239 EGD BIOPSY SINGLE/MULTIPLE: CPT | Performed by: INTERNAL MEDICINE

## 2019-01-01 PROCEDURE — 25000003 PHARM REV CODE 250: Performed by: GENERAL PRACTICE

## 2019-01-01 PROCEDURE — 63600175 PHARM REV CODE 636 W HCPCS: Performed by: PHYSICIAN ASSISTANT

## 2019-01-01 PROCEDURE — 36430 TRANSFUSION BLD/BLD COMPNT: CPT

## 2019-01-01 PROCEDURE — 63600175 PHARM REV CODE 636 W HCPCS: Performed by: INTERNAL MEDICINE

## 2019-01-01 PROCEDURE — 84300 ASSAY OF URINE SODIUM: CPT

## 2019-01-01 PROCEDURE — 99205 PR OFFICE/OUTPT VISIT, NEW, LEVL V, 60-74 MIN: ICD-10-PCS | Mod: ,,, | Performed by: INTERNAL MEDICINE

## 2019-01-01 PROCEDURE — 25000003 PHARM REV CODE 250: Performed by: INTERNAL MEDICINE

## 2019-01-01 PROCEDURE — 25000003 PHARM REV CODE 250

## 2019-01-01 PROCEDURE — 99292 PR CRITICAL CARE, ADDL 30 MIN: ICD-10-PCS | Mod: 25,,, | Performed by: CLINICAL NURSE SPECIALIST

## 2019-01-01 PROCEDURE — 88341 IMHCHEM/IMCYTCHM EA ADD ANTB: CPT | Mod: 26,,, | Performed by: PATHOLOGY

## 2019-01-01 PROCEDURE — 99204 OFFICE O/P NEW MOD 45 MIN: CPT | Mod: ,,, | Performed by: INTERNAL MEDICINE

## 2019-01-01 PROCEDURE — 36800 PR INSERT CANNULA,VEIN-VEIN: ICD-10-PCS | Mod: 59,,, | Performed by: CLINICAL NURSE SPECIALIST

## 2019-01-01 PROCEDURE — 82728 ASSAY OF FERRITIN: CPT

## 2019-01-01 PROCEDURE — 99205 OFFICE O/P NEW HI 60 MIN: CPT | Mod: ,,, | Performed by: INTERNAL MEDICINE

## 2019-01-01 PROCEDURE — 88342 IMHCHEM/IMCYTCHM 1ST ANTB: CPT | Mod: 26,,, | Performed by: PATHOLOGY

## 2019-01-01 PROCEDURE — 88333 CYTOLOGY SPECIMEN-FNA-RADIOLOGY ASSISTED WITH PATH ADEQUACY-CORE BX: ICD-10-PCS | Mod: 26,,, | Performed by: PATHOLOGY

## 2019-01-01 PROCEDURE — 86850 RBC ANTIBODY SCREEN: CPT

## 2019-01-01 PROCEDURE — 43239 PR EGD, FLEX, W/BIOPSY, SGL/MULTI: ICD-10-PCS | Mod: ,,, | Performed by: INTERNAL MEDICINE

## 2019-01-01 PROCEDURE — 36415 COLL VENOUS BLD VENIPUNCTURE: CPT

## 2019-01-01 PROCEDURE — 83605 ASSAY OF LACTIC ACID: CPT

## 2019-01-01 PROCEDURE — 84540 ASSAY OF URINE/UREA-N: CPT

## 2019-01-01 PROCEDURE — 99226 PR SUBSEQUENT OBSERVATION CARE,LEVEL III: CPT | Mod: ,,, | Performed by: HOSPITALIST

## 2019-01-01 PROCEDURE — 99233 PR SUBSEQUENT HOSPITAL CARE,LEVL III: ICD-10-PCS | Mod: 57,,, | Performed by: INTERNAL MEDICINE

## 2019-01-01 PROCEDURE — 25500020 PHARM REV CODE 255: Performed by: HOSPITALIST

## 2019-01-01 PROCEDURE — 80074 ACUTE HEPATITIS PANEL: CPT

## 2019-01-01 PROCEDURE — 96374 THER/PROPH/DIAG INJ IV PUSH: CPT

## 2019-01-01 PROCEDURE — 93010 ELECTROCARDIOGRAM REPORT: CPT | Mod: ,,, | Performed by: INTERNAL MEDICINE

## 2019-01-01 PROCEDURE — 25000003 PHARM REV CODE 250: Performed by: STUDENT IN AN ORGANIZED HEALTH CARE EDUCATION/TRAINING PROGRAM

## 2019-01-01 PROCEDURE — 99233 PR SUBSEQUENT HOSPITAL CARE,LEVL III: ICD-10-PCS | Mod: ,,, | Performed by: HOSPITALIST

## 2019-01-01 PROCEDURE — 99204 PR OFFICE/OUTPT VISIT, NEW, LEVL IV, 45-59 MIN: ICD-10-PCS | Mod: ,,, | Performed by: INTERNAL MEDICINE

## 2019-01-01 PROCEDURE — 80076 HEPATIC FUNCTION PANEL: CPT

## 2019-01-01 PROCEDURE — 93010 ELECTROCARDIOGRAM REPORT: CPT | Mod: 76,,, | Performed by: INTERNAL MEDICINE

## 2019-01-01 PROCEDURE — 99220 PR INITIAL OBSERVATION CARE,LEVL III: CPT | Mod: ,,, | Performed by: HOSPITALIST

## 2019-01-01 PROCEDURE — 99285 PR EMERGENCY DEPT VISIT,LEVEL V: ICD-10-PCS | Mod: ,,, | Performed by: EMERGENCY MEDICINE

## 2019-01-01 PROCEDURE — 88360 TUMOR IMMUNOHISTOCHEM/MANUAL: CPT | Mod: 26,,, | Performed by: PATHOLOGY

## 2019-01-01 PROCEDURE — 90945 DIALYSIS ONE EVALUATION: CPT

## 2019-01-01 PROCEDURE — 94761 N-INVAS EAR/PLS OXIMETRY MLT: CPT

## 2019-01-01 PROCEDURE — D9220A PRA ANESTHESIA: ICD-10-PCS | Mod: ANES,,, | Performed by: ANESTHESIOLOGY

## 2019-01-01 PROCEDURE — 84295 ASSAY OF SERUM SODIUM: CPT

## 2019-01-01 PROCEDURE — 20600001 HC STEP DOWN PRIVATE ROOM

## 2019-01-01 PROCEDURE — 93005 ELECTROCARDIOGRAM TRACING: CPT

## 2019-01-01 PROCEDURE — D9220A PRA ANESTHESIA: Mod: CRNA,,, | Performed by: NURSE ANESTHETIST, CERTIFIED REGISTERED

## 2019-01-01 PROCEDURE — 25000003 PHARM REV CODE 250: Performed by: CLINICAL NURSE SPECIALIST

## 2019-01-01 PROCEDURE — 99999 PR PBB SHADOW E&M-EST. PATIENT-LVL IV: ICD-10-PCS | Mod: PBBFAC,,, | Performed by: STUDENT IN AN ORGANIZED HEALTH CARE EDUCATION/TRAINING PROGRAM

## 2019-01-01 PROCEDURE — 36800 INSERTION OF CANNULA: CPT | Mod: 59,,, | Performed by: CLINICAL NURSE SPECIALIST

## 2019-01-01 PROCEDURE — 63600175 PHARM REV CODE 636 W HCPCS: Performed by: STUDENT IN AN ORGANIZED HEALTH CARE EDUCATION/TRAINING PROGRAM

## 2019-01-01 PROCEDURE — 27000221 HC OXYGEN, UP TO 24 HOURS

## 2019-01-01 PROCEDURE — D9220A PRA ANESTHESIA: ICD-10-PCS | Mod: CRNA,,, | Performed by: NURSE ANESTHETIST, CERTIFIED REGISTERED

## 2019-01-01 PROCEDURE — 25000003 PHARM REV CODE 250: Performed by: NURSE ANESTHETIST, CERTIFIED REGISTERED

## 2019-01-01 PROCEDURE — 82570 ASSAY OF URINE CREATININE: CPT

## 2019-01-01 PROCEDURE — 99232 PR SUBSEQUENT HOSPITAL CARE,LEVL II: ICD-10-PCS | Mod: ,,, | Performed by: HOSPITALIST

## 2019-01-01 PROCEDURE — 83540 ASSAY OF IRON: CPT

## 2019-01-01 PROCEDURE — C9113 INJ PANTOPRAZOLE SODIUM, VIA: HCPCS | Performed by: STUDENT IN AN ORGANIZED HEALTH CARE EDUCATION/TRAINING PROGRAM

## 2019-01-01 PROCEDURE — 80048 BASIC METABOLIC PNL TOTAL CA: CPT

## 2019-01-01 PROCEDURE — 99233 SBSQ HOSP IP/OBS HIGH 50: CPT | Mod: ,,, | Performed by: HOSPITALIST

## 2019-01-01 PROCEDURE — 63600175 PHARM REV CODE 636 W HCPCS: Performed by: NURSE PRACTITIONER

## 2019-01-01 PROCEDURE — 88341 IMHCHEM/IMCYTCHM EA ADD ANTB: CPT | Performed by: PATHOLOGY

## 2019-01-01 PROCEDURE — 82105 ALPHA-FETOPROTEIN SERUM: CPT

## 2019-01-01 PROCEDURE — 93010 EKG 12-LEAD: ICD-10-PCS | Mod: 76,,, | Performed by: INTERNAL MEDICINE

## 2019-01-01 PROCEDURE — 93010 RHYTHM STRIP: ICD-10-PCS | Mod: ,,, | Performed by: INTERNAL MEDICINE

## 2019-01-01 PROCEDURE — 85014 HEMATOCRIT: CPT

## 2019-01-01 PROCEDURE — 99214 PR OFFICE/OUTPT VISIT, EST, LEVL IV, 30-39 MIN: ICD-10-PCS | Mod: S$PBB,,, | Performed by: STUDENT IN AN ORGANIZED HEALTH CARE EDUCATION/TRAINING PROGRAM

## 2019-01-01 PROCEDURE — 87040 BLOOD CULTURE FOR BACTERIA: CPT

## 2019-01-01 PROCEDURE — 25000242 PHARM REV CODE 250 ALT 637 W/ HCPCS: Performed by: EMERGENCY MEDICINE

## 2019-01-01 PROCEDURE — 99239 HOSP IP/OBS DSCHRG MGMT >30: CPT | Mod: ,,, | Performed by: HOSPITALIST

## 2019-01-01 PROCEDURE — 80048 BASIC METABOLIC PNL TOTAL CA: CPT | Mod: 91

## 2019-01-01 PROCEDURE — 99291 CRITICAL CARE FIRST HOUR: CPT | Mod: 25,,, | Performed by: CLINICAL NURSE SPECIALIST

## 2019-01-01 PROCEDURE — 25000242 PHARM REV CODE 250 ALT 637 W/ HCPCS: Performed by: STUDENT IN AN ORGANIZED HEALTH CARE EDUCATION/TRAINING PROGRAM

## 2019-01-01 PROCEDURE — P9035 PLATELET PHERES LEUKOREDUCED: HCPCS

## 2019-01-01 PROCEDURE — 82803 BLOOD GASES ANY COMBINATION: CPT

## 2019-01-01 PROCEDURE — 81003 URINALYSIS AUTO W/O SCOPE: CPT

## 2019-01-01 PROCEDURE — 99239 PR HOSPITAL DISCHARGE DAY,>30 MIN: ICD-10-PCS | Mod: ,,, | Performed by: HOSPITALIST

## 2019-01-01 PROCEDURE — 25000003 PHARM REV CODE 250: Performed by: ANESTHESIOLOGY

## 2019-01-01 PROCEDURE — 84145 PROCALCITONIN (PCT): CPT

## 2019-01-01 PROCEDURE — 85379 FIBRIN DEGRADATION QUANT: CPT

## 2019-01-01 PROCEDURE — 99214 OFFICE O/P EST MOD 30 MIN: CPT | Mod: PBBFAC,25 | Performed by: STUDENT IN AN ORGANIZED HEALTH CARE EDUCATION/TRAINING PROGRAM

## 2019-01-01 PROCEDURE — 99223 1ST HOSP IP/OBS HIGH 75: CPT | Mod: ,,, | Performed by: INTERNAL MEDICINE

## 2019-01-01 PROCEDURE — 80076 HEPATIC FUNCTION PANEL: CPT | Mod: 91

## 2019-01-01 PROCEDURE — 88333 PATH CONSLTJ SURG CYTO XM 1: CPT | Mod: 26,,, | Performed by: PATHOLOGY

## 2019-01-01 PROCEDURE — A9585 GADOBUTROL INJECTION: HCPCS | Performed by: HOSPITALIST

## 2019-01-01 PROCEDURE — P9021 RED BLOOD CELLS UNIT: HCPCS

## 2019-01-01 PROCEDURE — 63600175 PHARM REV CODE 636 W HCPCS: Performed by: CLINICAL NURSE SPECIALIST

## 2019-01-01 PROCEDURE — 88342 CYTOLOGY SPECIMEN-FNA-RADIOLOGY ASSISTED WITH PATH ADEQUACY-CORE BX: ICD-10-PCS | Mod: 26,,, | Performed by: PATHOLOGY

## 2019-01-01 PROCEDURE — 99900026 HC AIRWAY MAINTENANCE (STAT)

## 2019-01-01 PROCEDURE — 27201012 HC FORCEPS, HOT/COLD, DISP: Performed by: INTERNAL MEDICINE

## 2019-01-01 PROCEDURE — 81001 URINALYSIS AUTO W/SCOPE: CPT

## 2019-01-01 PROCEDURE — 99285 EMERGENCY DEPT VISIT HI MDM: CPT | Mod: 25

## 2019-01-01 PROCEDURE — 43239 EGD BIOPSY SINGLE/MULTIPLE: CPT | Mod: ,,, | Performed by: INTERNAL MEDICINE

## 2019-01-01 PROCEDURE — 25500020 PHARM REV CODE 255: Performed by: EMERGENCY MEDICINE

## 2019-01-01 PROCEDURE — 99233 PR SUBSEQUENT HOSPITAL CARE,LEVL III: ICD-10-PCS | Mod: ,,, | Performed by: INTERNAL MEDICINE

## 2019-01-01 PROCEDURE — 88307 TISSUE EXAM BY PATHOLOGIST: CPT | Mod: 26,,, | Performed by: PATHOLOGY

## 2019-01-01 PROCEDURE — 85384 FIBRINOGEN ACTIVITY: CPT

## 2019-01-01 PROCEDURE — 84134 ASSAY OF PREALBUMIN: CPT

## 2019-01-01 PROCEDURE — 88305 TISSUE EXAM BY PATHOLOGIST: CPT | Mod: 26,,, | Performed by: PATHOLOGY

## 2019-01-01 PROCEDURE — 36620 INSERTION CATHETER ARTERY: CPT | Mod: ,,, | Performed by: INTERNAL MEDICINE

## 2019-01-01 PROCEDURE — 63600175 PHARM REV CODE 636 W HCPCS

## 2019-01-01 PROCEDURE — 99220 PR INITIAL OBSERVATION CARE,LEVL III: ICD-10-PCS | Mod: ,,, | Performed by: HOSPITALIST

## 2019-01-01 PROCEDURE — 96360 HYDRATION IV INFUSION INIT: CPT

## 2019-01-01 PROCEDURE — 99214 OFFICE O/P EST MOD 30 MIN: CPT | Mod: S$PBB,,, | Performed by: STUDENT IN AN ORGANIZED HEALTH CARE EDUCATION/TRAINING PROGRAM

## 2019-01-01 PROCEDURE — 88342 IMHCHEM/IMCYTCHM 1ST ANTB: CPT | Performed by: PATHOLOGY

## 2019-01-01 PROCEDURE — 25000003 PHARM REV CODE 250: Performed by: PHYSICIAN ASSISTANT

## 2019-01-01 PROCEDURE — 84132 ASSAY OF SERUM POTASSIUM: CPT

## 2019-01-01 PROCEDURE — 88305 TISSUE SPECIMEN TO PATHOLOGY - SURGERY: ICD-10-PCS | Mod: 26,,, | Performed by: PATHOLOGY

## 2019-01-01 PROCEDURE — 86301 IMMUNOASSAY TUMOR CA 19-9: CPT

## 2019-01-01 PROCEDURE — 84550 ASSAY OF BLOOD/URIC ACID: CPT

## 2019-01-01 PROCEDURE — 99232 SBSQ HOSP IP/OBS MODERATE 35: CPT | Mod: ,,, | Performed by: HOSPITALIST

## 2019-01-01 PROCEDURE — 84484 ASSAY OF TROPONIN QUANT: CPT

## 2019-01-01 PROCEDURE — 31500 INSERT EMERGENCY AIRWAY: CPT | Mod: ,,, | Performed by: INTERNAL MEDICINE

## 2019-01-01 PROCEDURE — 83735 ASSAY OF MAGNESIUM: CPT | Mod: 91

## 2019-01-01 PROCEDURE — 99291 PR CRITICAL CARE, E/M 30-74 MINUTES: ICD-10-PCS | Mod: 25,,, | Performed by: CLINICAL NURSE SPECIALIST

## 2019-01-01 PROCEDURE — 82550 ASSAY OF CK (CPK): CPT

## 2019-01-01 PROCEDURE — 36600 WITHDRAWAL OF ARTERIAL BLOOD: CPT

## 2019-01-01 PROCEDURE — 63600175 PHARM REV CODE 636 W HCPCS: Performed by: NURSE ANESTHETIST, CERTIFIED REGISTERED

## 2019-01-01 PROCEDURE — 99233 SBSQ HOSP IP/OBS HIGH 50: CPT | Mod: 57,,, | Performed by: INTERNAL MEDICINE

## 2019-01-01 PROCEDURE — 86920 COMPATIBILITY TEST SPIN: CPT

## 2019-01-01 PROCEDURE — 20000000 HC ICU ROOM

## 2019-01-01 PROCEDURE — 82962 GLUCOSE BLOOD TEST: CPT | Performed by: INTERNAL MEDICINE

## 2019-01-01 PROCEDURE — 88305 TISSUE EXAM BY PATHOLOGIST: CPT | Performed by: PATHOLOGY

## 2019-01-01 PROCEDURE — 82330 ASSAY OF CALCIUM: CPT

## 2019-01-01 PROCEDURE — 37000008 HC ANESTHESIA 1ST 15 MINUTES: Performed by: INTERNAL MEDICINE

## 2019-01-01 PROCEDURE — 88341 CYTOLOGY SPECIMEN-FNA-RADIOLOGY ASSISTED WITH PATH ADEQUACY-CORE BX: ICD-10-PCS | Mod: 26,,, | Performed by: PATHOLOGY

## 2019-01-01 PROCEDURE — 31500 PR INSERT, EMERGENCY ENDOTRACH AIRWAY: ICD-10-PCS | Mod: ,,, | Performed by: INTERNAL MEDICINE

## 2019-01-01 PROCEDURE — 88360 TISSUE SPECIMEN TO PATHOLOGY - SURGERY: ICD-10-PCS | Mod: 26,,, | Performed by: PATHOLOGY

## 2019-01-01 PROCEDURE — 99291 CRITICAL CARE FIRST HOUR: CPT | Mod: 25

## 2019-01-01 PROCEDURE — 99291 CRITICAL CARE FIRST HOUR: CPT | Mod: ,,, | Performed by: EMERGENCY MEDICINE

## 2019-01-01 PROCEDURE — 82378 CARCINOEMBRYONIC ANTIGEN: CPT

## 2019-01-01 PROCEDURE — 82977 ASSAY OF GGT: CPT

## 2019-01-01 PROCEDURE — 36620 ARTERIAL LINE: ICD-10-PCS | Mod: ,,, | Performed by: INTERNAL MEDICINE

## 2019-01-01 PROCEDURE — 37000009 HC ANESTHESIA EA ADD 15 MINS: Performed by: INTERNAL MEDICINE

## 2019-01-01 PROCEDURE — 80069 RENAL FUNCTION PANEL: CPT | Mod: 91

## 2019-01-01 PROCEDURE — 99233 SBSQ HOSP IP/OBS HIGH 50: CPT | Mod: ,,, | Performed by: INTERNAL MEDICINE

## 2019-01-01 RX ORDER — FENTANYL CITRATE 50 UG/ML
INJECTION, SOLUTION INTRAMUSCULAR; INTRAVENOUS
Status: COMPLETED
Start: 2019-01-01 | End: 2019-01-01

## 2019-01-01 RX ORDER — ALBUTEROL SULFATE 2.5 MG/.5ML
10 SOLUTION RESPIRATORY (INHALATION)
Status: COMPLETED | OUTPATIENT
Start: 2019-01-01 | End: 2019-01-01

## 2019-01-01 RX ORDER — MAGNESIUM SULFATE HEPTAHYDRATE 40 MG/ML
2 INJECTION, SOLUTION INTRAVENOUS
Status: DISCONTINUED | OUTPATIENT
Start: 2019-01-01 | End: 2019-01-01 | Stop reason: HOSPADM

## 2019-01-01 RX ORDER — GADOBUTROL 604.72 MG/ML
10 INJECTION INTRAVENOUS
Status: COMPLETED | OUTPATIENT
Start: 2019-01-01 | End: 2019-01-01

## 2019-01-01 RX ORDER — LACTULOSE 10 G/15ML
10 SOLUTION ORAL ONCE
Status: COMPLETED | OUTPATIENT
Start: 2019-01-01 | End: 2019-01-01

## 2019-01-01 RX ORDER — IBUPROFEN 200 MG
24 TABLET ORAL
Status: DISCONTINUED | OUTPATIENT
Start: 2019-01-01 | End: 2019-01-01 | Stop reason: HOSPADM

## 2019-01-01 RX ORDER — SODIUM CHLORIDE 0.9 % (FLUSH) 0.9 %
10 SYRINGE (ML) INJECTION
Status: DISCONTINUED | OUTPATIENT
Start: 2019-01-01 | End: 2019-01-01 | Stop reason: HOSPADM

## 2019-01-01 RX ORDER — LACTULOSE 10 G/15ML
20 SOLUTION ORAL 2 TIMES DAILY
Status: DISCONTINUED | OUTPATIENT
Start: 2019-01-01 | End: 2019-01-01

## 2019-01-01 RX ORDER — FUROSEMIDE 40 MG/1
40 TABLET ORAL DAILY
Status: DISCONTINUED | OUTPATIENT
Start: 2019-01-01 | End: 2019-01-01

## 2019-01-01 RX ORDER — DEXTROSE MONOHYDRATE 100 MG/ML
INJECTION, SOLUTION INTRAVENOUS CONTINUOUS
Status: DISCONTINUED | OUTPATIENT
Start: 2019-01-01 | End: 2019-01-01

## 2019-01-01 RX ORDER — ALBUTEROL SULFATE 2.5 MG/.5ML
15 SOLUTION RESPIRATORY (INHALATION) ONCE
Status: COMPLETED | OUTPATIENT
Start: 2019-01-01 | End: 2019-01-01

## 2019-01-01 RX ORDER — PHENYLEPHRINE HCL IN 0.9% NACL 1 MG/10 ML
100 SYRINGE (ML) INTRAVENOUS
Status: DISCONTINUED | OUTPATIENT
Start: 2019-01-01 | End: 2019-01-01

## 2019-01-01 RX ORDER — ADHESIVE BANDAGE
30 BANDAGE TOPICAL ONCE
Status: COMPLETED | OUTPATIENT
Start: 2019-01-01 | End: 2019-01-01

## 2019-01-01 RX ORDER — OXYCODONE HYDROCHLORIDE 10 MG/1
10 TABLET ORAL EVERY 8 HOURS PRN
Qty: 30 TABLET | Refills: 0 | Status: SHIPPED | OUTPATIENT
Start: 2019-01-01 | End: 2019-01-01

## 2019-01-01 RX ORDER — DEXTROSE MONOHYDRATE 100 MG/ML
INJECTION, SOLUTION INTRAVENOUS ONCE
Status: COMPLETED | OUTPATIENT
Start: 2019-01-01 | End: 2019-01-01

## 2019-01-01 RX ORDER — AMOXICILLIN 250 MG
2 CAPSULE ORAL 2 TIMES DAILY
Status: DISCONTINUED | OUTPATIENT
Start: 2019-01-01 | End: 2019-01-01

## 2019-01-01 RX ORDER — HYDROCODONE BITARTRATE AND ACETAMINOPHEN 500; 5 MG/1; MG/1
TABLET ORAL
Status: DISCONTINUED | OUTPATIENT
Start: 2019-01-01 | End: 2019-01-01 | Stop reason: HOSPADM

## 2019-01-01 RX ORDER — FUROSEMIDE 10 MG/ML
40 INJECTION INTRAMUSCULAR; INTRAVENOUS ONCE
Status: DISCONTINUED | OUTPATIENT
Start: 2019-01-01 | End: 2019-01-01

## 2019-01-01 RX ORDER — INDOMETHACIN 25 MG/1
100 CAPSULE ORAL ONCE
Status: COMPLETED | OUTPATIENT
Start: 2019-01-01 | End: 2019-01-01

## 2019-01-01 RX ORDER — ONDANSETRON 2 MG/ML
4 INJECTION INTRAMUSCULAR; INTRAVENOUS EVERY 6 HOURS PRN
Status: DISCONTINUED | OUTPATIENT
Start: 2019-01-01 | End: 2019-01-01 | Stop reason: HOSPADM

## 2019-01-01 RX ORDER — AMOXICILLIN AND CLAVULANATE POTASSIUM 875; 125 MG/1; MG/1
1 TABLET, FILM COATED ORAL EVERY 12 HOURS
Qty: 5 TABLET | Refills: 0 | Status: SHIPPED | OUTPATIENT
Start: 2019-01-01 | End: 2019-01-01

## 2019-01-01 RX ORDER — PROPOFOL 10 MG/ML
5 INJECTION, EMULSION INTRAVENOUS CONTINUOUS
Status: DISCONTINUED | OUTPATIENT
Start: 2019-01-01 | End: 2019-01-01

## 2019-01-01 RX ORDER — POLYETHYLENE GLYCOL 3350 17 G/17G
17 POWDER, FOR SOLUTION ORAL DAILY PRN
COMMUNITY

## 2019-01-01 RX ORDER — PHENYLEPHRINE HCL IN 0.9% NACL 1 MG/10 ML
SYRINGE (ML) INTRAVENOUS
Status: COMPLETED
Start: 2019-01-01 | End: 2019-01-01

## 2019-01-01 RX ORDER — VASOPRESSIN 20 [USP'U]/ML
INJECTION, SOLUTION INTRAMUSCULAR; SUBCUTANEOUS
Status: COMPLETED
Start: 2019-01-01 | End: 2019-01-01

## 2019-01-01 RX ORDER — ETOMIDATE 2 MG/ML
INJECTION INTRAVENOUS
Status: COMPLETED
Start: 2019-01-01 | End: 2019-01-01

## 2019-01-01 RX ORDER — AMOXICILLIN 250 MG
2 CAPSULE ORAL 2 TIMES DAILY
COMMUNITY
Start: 2019-01-01

## 2019-01-01 RX ORDER — MEPERIDINE HYDROCHLORIDE 25 MG/ML
12.5 INJECTION INTRAMUSCULAR; INTRAVENOUS; SUBCUTANEOUS ONCE AS NEEDED
Status: DISCONTINUED | OUTPATIENT
Start: 2019-01-01 | End: 2019-01-01 | Stop reason: HOSPADM

## 2019-01-01 RX ORDER — BISACODYL 10 MG
10 SUPPOSITORY, RECTAL RECTAL DAILY PRN
Refills: 0 | COMMUNITY
Start: 2019-01-01

## 2019-01-01 RX ORDER — POLYETHYLENE GLYCOL 3350 17 G/17G
17 POWDER, FOR SOLUTION ORAL DAILY
Status: DISCONTINUED | OUTPATIENT
Start: 2019-01-01 | End: 2019-01-01 | Stop reason: HOSPADM

## 2019-01-01 RX ORDER — CHLORHEXIDINE GLUCONATE ORAL RINSE 1.2 MG/ML
15 SOLUTION DENTAL 2 TIMES DAILY
Status: DISCONTINUED | OUTPATIENT
Start: 2019-01-01 | End: 2019-01-01 | Stop reason: HOSPADM

## 2019-01-01 RX ORDER — DICYCLOMINE HYDROCHLORIDE 10 MG/ML
20 INJECTION INTRAMUSCULAR 4 TIMES DAILY PRN
Status: DISCONTINUED | OUTPATIENT
Start: 2019-01-01 | End: 2019-01-01 | Stop reason: HOSPADM

## 2019-01-01 RX ORDER — METOCLOPRAMIDE 10 MG/1
10 TABLET ORAL EVERY 6 HOURS PRN
Status: DISCONTINUED | OUTPATIENT
Start: 2019-01-01 | End: 2019-01-01

## 2019-01-01 RX ORDER — ACETAMINOPHEN 325 MG/1
325 TABLET ORAL EVERY 4 HOURS PRN
Status: DISCONTINUED | OUTPATIENT
Start: 2019-01-01 | End: 2019-01-01

## 2019-01-01 RX ORDER — AMOXICILLIN 250 MG
2 CAPSULE ORAL 2 TIMES DAILY
Status: DISCONTINUED | OUTPATIENT
Start: 2019-01-01 | End: 2019-01-01 | Stop reason: HOSPADM

## 2019-01-01 RX ORDER — LACTULOSE 10 G/15ML
30 SOLUTION ORAL 2 TIMES DAILY
Status: DISCONTINUED | OUTPATIENT
Start: 2019-01-01 | End: 2019-01-01

## 2019-01-01 RX ORDER — PROPOFOL 10 MG/ML
INJECTION, EMULSION INTRAVENOUS
Status: DISPENSED
Start: 2019-01-01 | End: 2019-01-01

## 2019-01-01 RX ORDER — IBUPROFEN 200 MG
16 TABLET ORAL
Status: DISCONTINUED | OUTPATIENT
Start: 2019-01-01 | End: 2019-01-01 | Stop reason: HOSPADM

## 2019-01-01 RX ORDER — MORPHINE SULFATE 30 MG/1
30 TABLET, FILM COATED, EXTENDED RELEASE ORAL 2 TIMES DAILY
Qty: 60 TABLET | Refills: 0 | Status: SHIPPED | OUTPATIENT
Start: 2019-01-01

## 2019-01-01 RX ORDER — POLYETHYLENE GLYCOL 3350 17 G/17G
17 POWDER, FOR SOLUTION ORAL 2 TIMES DAILY PRN
Status: DISCONTINUED | OUTPATIENT
Start: 2019-01-01 | End: 2019-01-01

## 2019-01-01 RX ORDER — MIDAZOLAM HYDROCHLORIDE 1 MG/ML
INJECTION INTRAMUSCULAR; INTRAVENOUS CODE/TRAUMA/SEDATION MEDICATION
Status: COMPLETED | OUTPATIENT
Start: 2019-01-01 | End: 2019-01-01

## 2019-01-01 RX ORDER — ONDANSETRON 2 MG/ML
4 INJECTION INTRAMUSCULAR; INTRAVENOUS ONCE
Status: COMPLETED | OUTPATIENT
Start: 2019-01-01 | End: 2019-01-01

## 2019-01-01 RX ORDER — FUROSEMIDE 10 MG/ML
80 INJECTION INTRAMUSCULAR; INTRAVENOUS ONCE
Status: COMPLETED | OUTPATIENT
Start: 2019-01-01 | End: 2019-01-01

## 2019-01-01 RX ORDER — HYDROMORPHONE HYDROCHLORIDE 1 MG/ML
1 INJECTION, SOLUTION INTRAMUSCULAR; INTRAVENOUS; SUBCUTANEOUS EVERY 4 HOURS PRN
Status: DISCONTINUED | OUTPATIENT
Start: 2019-01-01 | End: 2019-01-01 | Stop reason: HOSPADM

## 2019-01-01 RX ORDER — PROPOFOL 10 MG/ML
VIAL (ML) INTRAVENOUS
Status: DISCONTINUED | OUTPATIENT
Start: 2019-01-01 | End: 2019-01-01

## 2019-01-01 RX ORDER — LIDOCAINE AND PRILOCAINE 25; 25 MG/G; MG/G
CREAM TOPICAL
Qty: 25 G | Refills: 0 | Status: SHIPPED | OUTPATIENT
Start: 2019-01-01

## 2019-01-01 RX ORDER — PREGABALIN 75 MG/1
75 CAPSULE ORAL 2 TIMES DAILY
Status: CANCELLED | OUTPATIENT
Start: 2019-01-01 | End: 2019-01-01

## 2019-01-01 RX ORDER — NOREPINEPHRINE BITARTRATE 1 MG/ML
INJECTION, SOLUTION INTRAVENOUS
Status: DISCONTINUED
Start: 2019-01-01 | End: 2019-01-01 | Stop reason: HOSPADM

## 2019-01-01 RX ORDER — LIDOCAINE HCL/PF 100 MG/5ML
SYRINGE (ML) INTRAVENOUS
Status: DISCONTINUED | OUTPATIENT
Start: 2019-01-01 | End: 2019-01-01

## 2019-01-01 RX ORDER — HYDROMORPHONE HYDROCHLORIDE 1 MG/ML
0.2 INJECTION, SOLUTION INTRAMUSCULAR; INTRAVENOUS; SUBCUTANEOUS EVERY 5 MIN PRN
Status: DISCONTINUED | OUTPATIENT
Start: 2019-01-01 | End: 2019-01-01 | Stop reason: HOSPADM

## 2019-01-01 RX ORDER — OXYCODONE HYDROCHLORIDE 10 MG/1
10 TABLET ORAL EVERY 8 HOURS PRN
Status: DISCONTINUED | OUTPATIENT
Start: 2019-01-01 | End: 2019-01-01

## 2019-01-01 RX ORDER — SODIUM CHLORIDE 9 MG/ML
INJECTION, SOLUTION INTRAVENOUS CONTINUOUS
Status: DISCONTINUED | OUTPATIENT
Start: 2019-01-01 | End: 2019-01-01

## 2019-01-01 RX ORDER — FERROUS SULFATE 325(65) MG
325 TABLET, DELAYED RELEASE (ENTERIC COATED) ORAL 2 TIMES DAILY
Status: DISCONTINUED | OUTPATIENT
Start: 2019-01-01 | End: 2019-01-01 | Stop reason: HOSPADM

## 2019-01-01 RX ORDER — FENTANYL CITRATE 50 UG/ML
50 INJECTION, SOLUTION INTRAMUSCULAR; INTRAVENOUS
Status: DISCONTINUED | OUTPATIENT
Start: 2019-01-01 | End: 2019-01-01 | Stop reason: HOSPADM

## 2019-01-01 RX ORDER — DEXTROSE 50 % IN WATER (D50W) INTRAVENOUS SYRINGE
25
Status: DISCONTINUED | OUTPATIENT
Start: 2019-01-01 | End: 2019-01-01

## 2019-01-01 RX ORDER — ADHESIVE BANDAGE
30 BANDAGE TOPICAL DAILY
COMMUNITY
Start: 2019-01-01

## 2019-01-01 RX ORDER — MORPHINE SULFATE 15 MG/1
30 TABLET, FILM COATED, EXTENDED RELEASE ORAL 2 TIMES DAILY
Status: DISCONTINUED | OUTPATIENT
Start: 2019-01-01 | End: 2019-01-01

## 2019-01-01 RX ORDER — LACTULOSE 10 G/15ML
10 SOLUTION ORAL 2 TIMES DAILY
Status: DISCONTINUED | OUTPATIENT
Start: 2019-01-01 | End: 2019-01-01

## 2019-01-01 RX ORDER — MORPHINE SULFATE 2 MG/ML
2 INJECTION, SOLUTION INTRAMUSCULAR; INTRAVENOUS EVERY 6 HOURS PRN
Status: COMPLETED | OUTPATIENT
Start: 2019-01-01 | End: 2019-01-01

## 2019-01-01 RX ORDER — LACTULOSE 10 G/15ML
10 SOLUTION ORAL 3 TIMES DAILY
Status: DISCONTINUED | OUTPATIENT
Start: 2019-01-01 | End: 2019-01-01

## 2019-01-01 RX ORDER — HYDROCODONE BITARTRATE AND ACETAMINOPHEN 500; 5 MG/1; MG/1
TABLET ORAL CONTINUOUS
Status: DISCONTINUED | OUTPATIENT
Start: 2019-01-01 | End: 2019-01-01 | Stop reason: HOSPADM

## 2019-01-01 RX ORDER — PROPOFOL 10 MG/ML
VIAL (ML) INTRAVENOUS CONTINUOUS PRN
Status: DISCONTINUED | OUTPATIENT
Start: 2019-01-01 | End: 2019-01-01

## 2019-01-01 RX ORDER — FENTANYL CITRATE 50 UG/ML
INJECTION, SOLUTION INTRAMUSCULAR; INTRAVENOUS CODE/TRAUMA/SEDATION MEDICATION
Status: COMPLETED | OUTPATIENT
Start: 2019-01-01 | End: 2019-01-01

## 2019-01-01 RX ORDER — METRONIDAZOLE 500 MG/1
500 TABLET ORAL EVERY 8 HOURS
Status: COMPLETED | OUTPATIENT
Start: 2019-01-01 | End: 2019-01-01

## 2019-01-01 RX ORDER — AMOXICILLIN AND CLAVULANATE POTASSIUM 875; 125 MG/1; MG/1
1 TABLET, FILM COATED ORAL EVERY 12 HOURS
Status: DISCONTINUED | OUTPATIENT
Start: 2019-01-01 | End: 2019-01-01 | Stop reason: HOSPADM

## 2019-01-01 RX ORDER — ONDANSETRON 8 MG/1
8 TABLET, ORALLY DISINTEGRATING ORAL EVERY 12 HOURS PRN
Qty: 30 TABLET | Refills: 0 | Status: SHIPPED | OUTPATIENT
Start: 2019-01-01

## 2019-01-01 RX ORDER — ONDANSETRON 2 MG/ML
4 INJECTION INTRAMUSCULAR; INTRAVENOUS
Status: COMPLETED | OUTPATIENT
Start: 2019-01-01 | End: 2019-01-01

## 2019-01-01 RX ORDER — OXYCODONE HYDROCHLORIDE 10 MG/1
10 TABLET ORAL EVERY 8 HOURS PRN
Qty: 30 TABLET | Refills: 0 | Status: SHIPPED | OUTPATIENT
Start: 2019-01-01

## 2019-01-01 RX ORDER — ONDANSETRON 8 MG/1
8 TABLET, ORALLY DISINTEGRATING ORAL EVERY 12 HOURS PRN
Status: DISCONTINUED | OUTPATIENT
Start: 2019-01-01 | End: 2019-01-01

## 2019-01-01 RX ORDER — RAMELTEON 8 MG/1
8 TABLET ORAL NIGHTLY PRN
Status: COMPLETED | OUTPATIENT
Start: 2019-01-01 | End: 2019-01-01

## 2019-01-01 RX ORDER — ENOXAPARIN SODIUM 100 MG/ML
40 INJECTION SUBCUTANEOUS EVERY 24 HOURS
Status: DISCONTINUED | OUTPATIENT
Start: 2019-01-01 | End: 2019-01-01

## 2019-01-01 RX ORDER — MORPHINE SULFATE 2 MG/ML
2 INJECTION, SOLUTION INTRAMUSCULAR; INTRAVENOUS ONCE AS NEEDED
Status: COMPLETED | OUTPATIENT
Start: 2019-01-01 | End: 2019-01-01

## 2019-01-01 RX ORDER — FENTANYL CITRATE 50 UG/ML
50 INJECTION, SOLUTION INTRAMUSCULAR; INTRAVENOUS
Status: DISCONTINUED | OUTPATIENT
Start: 2019-07-06 | End: 2019-01-01 | Stop reason: HOSPADM

## 2019-01-01 RX ORDER — SUCCINYLCHOLINE CHLORIDE 20 MG/ML
INJECTION INTRAMUSCULAR; INTRAVENOUS
Status: COMPLETED
Start: 2019-01-01 | End: 2019-01-01

## 2019-01-01 RX ORDER — METRONIDAZOLE 500 MG/1
500 TABLET ORAL EVERY 8 HOURS
Status: DISCONTINUED | OUTPATIENT
Start: 2019-01-01 | End: 2019-01-01

## 2019-01-01 RX ORDER — URSODIOL 300 MG/1
300 CAPSULE ORAL 2 TIMES DAILY
Qty: 60 CAPSULE | Refills: 2 | Status: SHIPPED | OUTPATIENT
Start: 2019-01-01 | End: 2019-09-29

## 2019-01-01 RX ORDER — HEPARIN SODIUM 10000 [USP'U]/100ML
400 INJECTION, SOLUTION INTRAVENOUS CONTINUOUS
Status: DISCONTINUED | OUTPATIENT
Start: 2019-01-01 | End: 2019-01-01 | Stop reason: HOSPADM

## 2019-01-01 RX ORDER — ROCURONIUM BROMIDE 10 MG/ML
INJECTION, SOLUTION INTRAVENOUS
Status: COMPLETED
Start: 2019-01-01 | End: 2019-01-01

## 2019-01-01 RX ORDER — OXYCODONE HYDROCHLORIDE 5 MG/1
10 TABLET ORAL EVERY 4 HOURS PRN
Status: DISCONTINUED | OUTPATIENT
Start: 2019-01-01 | End: 2019-01-01 | Stop reason: HOSPADM

## 2019-01-01 RX ORDER — ONDANSETRON 8 MG/1
8 TABLET, ORALLY DISINTEGRATING ORAL EVERY 12 HOURS PRN
Qty: 30 TABLET | Refills: 0 | Status: SHIPPED | OUTPATIENT
Start: 2019-01-01 | End: 2019-01-01

## 2019-01-01 RX ORDER — PANTOPRAZOLE SODIUM 40 MG/10ML
40 INJECTION, POWDER, LYOPHILIZED, FOR SOLUTION INTRAVENOUS 2 TIMES DAILY
Status: DISCONTINUED | OUTPATIENT
Start: 2019-01-01 | End: 2019-01-01 | Stop reason: HOSPADM

## 2019-01-01 RX ORDER — GLUCAGON 1 MG
1 KIT INJECTION
Status: DISCONTINUED | OUTPATIENT
Start: 2019-01-01 | End: 2019-01-01 | Stop reason: HOSPADM

## 2019-01-01 RX ORDER — FUROSEMIDE 40 MG/1
40 TABLET ORAL DAILY
Qty: 60 TABLET | Refills: 1 | Status: SHIPPED | OUTPATIENT
Start: 2019-01-01 | End: 2019-10-29

## 2019-01-01 RX ORDER — LORAZEPAM 2 MG/ML
0.25 INJECTION INTRAMUSCULAR ONCE AS NEEDED
Status: DISCONTINUED | OUTPATIENT
Start: 2019-01-01 | End: 2019-01-01 | Stop reason: HOSPADM

## 2019-01-01 RX ORDER — SUCRALFATE 1 G/10ML
1 SUSPENSION ORAL EVERY 6 HOURS
Status: DISCONTINUED | OUTPATIENT
Start: 2019-01-01 | End: 2019-01-01

## 2019-01-01 RX ORDER — BISACODYL 10 MG
10 SUPPOSITORY, RECTAL RECTAL DAILY PRN
Status: DISCONTINUED | OUTPATIENT
Start: 2019-01-01 | End: 2019-01-01 | Stop reason: HOSPADM

## 2019-01-01 RX ORDER — NOREPINEPHRINE BITARTRATE/D5W 4MG/250ML
0.02 PLASTIC BAG, INJECTION (ML) INTRAVENOUS CONTINUOUS
Status: DISCONTINUED | OUTPATIENT
Start: 2019-01-01 | End: 2019-01-01 | Stop reason: HOSPADM

## 2019-01-01 RX ORDER — NOREPINEPHRINE BITARTRATE/D5W 4MG/250ML
PLASTIC BAG, INJECTION (ML) INTRAVENOUS
Status: DISPENSED
Start: 2019-01-01 | End: 2019-01-01

## 2019-01-01 RX ORDER — URSODIOL 300 MG/1
300 CAPSULE ORAL 2 TIMES DAILY
Status: DISCONTINUED | OUTPATIENT
Start: 2019-01-01 | End: 2019-01-01

## 2019-01-01 RX ORDER — METOCLOPRAMIDE HYDROCHLORIDE 5 MG/ML
10 INJECTION INTRAMUSCULAR; INTRAVENOUS
Status: COMPLETED | OUTPATIENT
Start: 2019-01-01 | End: 2019-01-01

## 2019-01-01 RX ORDER — SODIUM CHLORIDE 9 MG/ML
INJECTION, SOLUTION INTRAVENOUS CONTINUOUS PRN
Status: DISCONTINUED | OUTPATIENT
Start: 2019-01-01 | End: 2019-01-01

## 2019-01-01 RX ORDER — ONDANSETRON 2 MG/ML
4 INJECTION INTRAMUSCULAR; INTRAVENOUS EVERY 6 HOURS PRN
Status: DISCONTINUED | OUTPATIENT
Start: 2019-01-01 | End: 2019-01-01

## 2019-01-01 RX ORDER — PROPOFOL 10 MG/ML
INJECTION, EMULSION INTRAVENOUS
Status: COMPLETED
Start: 2019-01-01 | End: 2019-01-01

## 2019-01-01 RX ORDER — ACETAMINOPHEN 325 MG/1
650 TABLET ORAL EVERY 4 HOURS PRN
Status: DISCONTINUED | OUTPATIENT
Start: 2019-01-01 | End: 2019-01-01

## 2019-01-01 RX ORDER — PANTOPRAZOLE SODIUM 40 MG/10ML
80 INJECTION, POWDER, LYOPHILIZED, FOR SOLUTION INTRAVENOUS
Status: COMPLETED | OUTPATIENT
Start: 2019-01-01 | End: 2019-01-01

## 2019-01-01 RX ORDER — ONDANSETRON 8 MG/1
8 TABLET, ORALLY DISINTEGRATING ORAL EVERY 8 HOURS PRN
Status: DISCONTINUED | OUTPATIENT
Start: 2019-01-01 | End: 2019-01-01 | Stop reason: HOSPADM

## 2019-01-01 RX ADMIN — SODIUM CHLORIDE 1000 ML: 0.9 INJECTION, SOLUTION INTRAVENOUS at 07:07

## 2019-01-01 RX ADMIN — ALBUTEROL SULFATE 15 MG: 2.5 SOLUTION RESPIRATORY (INHALATION) at 10:07

## 2019-01-01 RX ADMIN — STANDARDIZED SENNA CONCENTRATE AND DOCUSATE SODIUM 2 TABLET: 8.6; 5 TABLET, FILM COATED ORAL at 08:06

## 2019-01-01 RX ADMIN — Medication 0.14 MCG/KG/MIN: at 05:07

## 2019-01-01 RX ADMIN — OXYCODONE HYDROCHLORIDE 10 MG: 5 TABLET ORAL at 07:06

## 2019-01-01 RX ADMIN — ROCURONIUM BROMIDE 100 MG: 10 INJECTION, SOLUTION INTRAVENOUS at 09:07

## 2019-01-01 RX ADMIN — ENOXAPARIN SODIUM 40 MG: 100 INJECTION SUBCUTANEOUS at 04:06

## 2019-01-01 RX ADMIN — POLYETHYLENE GLYCOL 3350 17 G: 17 POWDER, FOR SOLUTION ORAL at 08:06

## 2019-01-01 RX ADMIN — FUROSEMIDE 80 MG: 10 INJECTION, SOLUTION INTRAMUSCULAR; INTRAVENOUS at 10:07

## 2019-01-01 RX ADMIN — OXYCODONE HYDROCHLORIDE 10 MG: 5 TABLET ORAL at 02:06

## 2019-01-01 RX ADMIN — STANDARDIZED SENNA CONCENTRATE AND DOCUSATE SODIUM 2 TABLET: 8.6; 5 TABLET, FILM COATED ORAL at 09:06

## 2019-01-01 RX ADMIN — STANDARDIZED SENNA CONCENTRATE AND DOCUSATE SODIUM 2 TABLET: 8.6; 5 TABLET, FILM COATED ORAL at 11:06

## 2019-01-01 RX ADMIN — ENOXAPARIN SODIUM 40 MG: 100 INJECTION SUBCUTANEOUS at 08:06

## 2019-01-01 RX ADMIN — IOHEXOL 75 ML: 350 INJECTION, SOLUTION INTRAVENOUS at 11:06

## 2019-01-01 RX ADMIN — POLYETHYLENE GLYCOL 3350 17 G: 17 POWDER, FOR SOLUTION ORAL at 04:06

## 2019-01-01 RX ADMIN — ACETAMINOPHEN 325 MG: 325 TABLET ORAL at 12:06

## 2019-01-01 RX ADMIN — SENNOSIDES,DOCUSATE SODIUM 2 TABLET: 8.6; 5 TABLET, FILM COATED ORAL at 08:07

## 2019-01-01 RX ADMIN — HYDROMORPHONE HYDROCHLORIDE 1 MG: 1 INJECTION, SOLUTION INTRAMUSCULAR; INTRAVENOUS; SUBCUTANEOUS at 03:06

## 2019-01-01 RX ADMIN — ONDANSETRON 4 MG: 2 INJECTION INTRAMUSCULAR; INTRAVENOUS at 08:07

## 2019-01-01 RX ADMIN — OXYCODONE HYDROCHLORIDE 10 MG: 5 TABLET ORAL at 06:06

## 2019-01-01 RX ADMIN — HYDROMORPHONE HYDROCHLORIDE 1 MG: 1 INJECTION, SOLUTION INTRAMUSCULAR; INTRAVENOUS; SUBCUTANEOUS at 12:06

## 2019-01-01 RX ADMIN — OXYCODONE HYDROCHLORIDE 10 MG: 5 TABLET ORAL at 08:06

## 2019-01-01 RX ADMIN — ACETAMINOPHEN 325 MG: 325 TABLET ORAL at 01:06

## 2019-01-01 RX ADMIN — OXYCODONE HYDROCHLORIDE 10 MG: 5 TABLET ORAL at 11:06

## 2019-01-01 RX ADMIN — SODIUM CHLORIDE 1000 ML: 0.9 INJECTION, SOLUTION INTRAVENOUS at 03:06

## 2019-01-01 RX ADMIN — DEXTROSE 250 ML: 10 SOLUTION INTRAVENOUS at 01:07

## 2019-01-01 RX ADMIN — PROPOFOL 100 MCG/KG/MIN: 10 INJECTION, EMULSION INTRAVENOUS at 02:07

## 2019-01-01 RX ADMIN — FENTANYL CITRATE: 50 INJECTION, SOLUTION INTRAMUSCULAR; INTRAVENOUS at 08:07

## 2019-01-01 RX ADMIN — ENOXAPARIN SODIUM 40 MG: 100 INJECTION SUBCUTANEOUS at 09:06

## 2019-01-01 RX ADMIN — FERROUS SULFATE TAB EC 325 MG (65 MG FE EQUIVALENT) 325 MG: 325 (65 FE) TABLET DELAYED RESPONSE at 08:06

## 2019-01-01 RX ADMIN — PANTOPRAZOLE SODIUM 40 MG: 40 INJECTION, POWDER, FOR SOLUTION INTRAVENOUS at 10:07

## 2019-01-01 RX ADMIN — ONDANSETRON 4 MG: 2 INJECTION INTRAMUSCULAR; INTRAVENOUS at 10:07

## 2019-01-01 RX ADMIN — SODIUM BICARBONATE 100 MEQ: 84 INJECTION, SOLUTION INTRAVENOUS at 10:07

## 2019-01-01 RX ADMIN — SODIUM CHLORIDE 1000 ML: 0.9 INJECTION, SOLUTION INTRAVENOUS at 11:07

## 2019-01-01 RX ADMIN — OXYCODONE HYDROCHLORIDE 10 MG: 5 TABLET ORAL at 03:06

## 2019-01-01 RX ADMIN — METRONIDAZOLE 500 MG: 500 TABLET ORAL at 02:06

## 2019-01-01 RX ADMIN — PROPOFOL 20 MCG/KG/MIN: 10 INJECTION, EMULSION INTRAVENOUS at 09:07

## 2019-01-01 RX ADMIN — PROPOFOL 40 MG: 10 INJECTION, EMULSION INTRAVENOUS at 02:07

## 2019-01-01 RX ADMIN — ACETAMINOPHEN 325 MG: 325 TABLET ORAL at 09:06

## 2019-01-01 RX ADMIN — METRONIDAZOLE 500 MG: 500 TABLET ORAL at 09:06

## 2019-01-01 RX ADMIN — Medication 3 MG: at 09:07

## 2019-01-01 RX ADMIN — MIDAZOLAM HYDROCHLORIDE 2 MG: 1 INJECTION, SOLUTION INTRAMUSCULAR; INTRAVENOUS at 10:06

## 2019-01-01 RX ADMIN — OXYCODONE HYDROCHLORIDE 10 MG: 5 TABLET ORAL at 09:06

## 2019-01-01 RX ADMIN — MORPHINE SULFATE 30 MG: 30 TABLET, EXTENDED RELEASE ORAL at 08:07

## 2019-01-01 RX ADMIN — CHLORHEXIDINE GLUCONATE 0.12% ORAL RINSE 15 ML: 1.2 LIQUID ORAL at 10:07

## 2019-01-01 RX ADMIN — METRONIDAZOLE 500 MG: 500 TABLET ORAL at 01:06

## 2019-01-01 RX ADMIN — ONDANSETRON 4 MG: 2 INJECTION INTRAMUSCULAR; INTRAVENOUS at 01:07

## 2019-01-01 RX ADMIN — METOCLOPRAMIDE 10 MG: 10 TABLET ORAL at 06:07

## 2019-01-01 RX ADMIN — SODIUM CHLORIDE, SODIUM LACTATE, POTASSIUM CHLORIDE, AND CALCIUM CHLORIDE 500 ML: .6; .31; .03; .02 INJECTION, SOLUTION INTRAVENOUS at 09:07

## 2019-01-01 RX ADMIN — CALCIUM GLUCONATE 1 G: 98 INJECTION, SOLUTION INTRAVENOUS at 09:07

## 2019-01-01 RX ADMIN — ALBUTEROL SULFATE 10 MG: 2.5 SOLUTION RESPIRATORY (INHALATION) at 12:07

## 2019-01-01 RX ADMIN — SUCRALFATE 1 G: 1 SUSPENSION ORAL at 06:07

## 2019-01-01 RX ADMIN — OXYCODONE HYDROCHLORIDE 10 MG: 5 TABLET ORAL at 10:06

## 2019-01-01 RX ADMIN — AMOXICILLIN AND CLAVULANATE POTASSIUM 1 TABLET: 875; 125 TABLET, FILM COATED ORAL at 08:06

## 2019-01-01 RX ADMIN — MORPHINE SULFATE 2 MG: 2 INJECTION, SOLUTION INTRAMUSCULAR; INTRAVENOUS at 11:06

## 2019-01-01 RX ADMIN — VANCOMYCIN HYDROCHLORIDE 1500 MG: 1.5 INJECTION, POWDER, LYOPHILIZED, FOR SOLUTION INTRAVENOUS at 11:07

## 2019-01-01 RX ADMIN — ONDANSETRON 8 MG: 8 TABLET, ORALLY DISINTEGRATING ORAL at 08:06

## 2019-01-01 RX ADMIN — CEFTRIAXONE 2 G: 2 INJECTION, SOLUTION INTRAVENOUS at 03:06

## 2019-01-01 RX ADMIN — MAGNESIUM HYDROXIDE 2400 MG: 400 SUSPENSION ORAL at 05:06

## 2019-01-01 RX ADMIN — MORPHINE SULFATE 2 MG: 2 INJECTION, SOLUTION INTRAMUSCULAR; INTRAVENOUS at 05:06

## 2019-01-01 RX ADMIN — FENTANYL CITRATE 50 MCG: 50 INJECTION, SOLUTION INTRAMUSCULAR; INTRAVENOUS at 10:06

## 2019-01-01 RX ADMIN — DEXTROSE: 10 SOLUTION INTRAVENOUS at 02:07

## 2019-01-01 RX ADMIN — SODIUM CHLORIDE: 0.9 INJECTION, SOLUTION INTRAVENOUS at 02:07

## 2019-01-01 RX ADMIN — METRONIDAZOLE 500 MG: 500 TABLET ORAL at 10:06

## 2019-01-01 RX ADMIN — MAGNESIUM HYDROXIDE 2400 MG: 400 SUSPENSION ORAL at 04:06

## 2019-01-01 RX ADMIN — SUCRALFATE 1 G: 1 SUSPENSION ORAL at 05:07

## 2019-01-01 RX ADMIN — SODIUM CHLORIDE: 0.9 INJECTION, SOLUTION INTRAVENOUS at 03:07

## 2019-01-01 RX ADMIN — DEXTROSE 50 ML/HR: 10 SOLUTION INTRAVENOUS at 02:07

## 2019-01-01 RX ADMIN — ONDANSETRON 8 MG: 8 TABLET, ORALLY DISINTEGRATING ORAL at 03:06

## 2019-01-01 RX ADMIN — PANTOPRAZOLE SODIUM 80 MG: 40 INJECTION, POWDER, FOR SOLUTION INTRAVENOUS at 11:07

## 2019-01-01 RX ADMIN — SODIUM BICARBONATE 150 ML/HR: 84 INJECTION, SOLUTION INTRAVENOUS at 05:07

## 2019-01-01 RX ADMIN — ONDANSETRON 4 MG: 2 INJECTION INTRAMUSCULAR; INTRAVENOUS at 11:07

## 2019-01-01 RX ADMIN — LACTULOSE 10 G: 20 SOLUTION ORAL at 08:07

## 2019-01-01 RX ADMIN — IOHEXOL 75 ML: 350 INJECTION, SOLUTION INTRAVENOUS at 04:06

## 2019-01-01 RX ADMIN — PHYTONADIONE 10 MG: 10 INJECTION, EMULSION INTRAMUSCULAR; INTRAVENOUS; SUBCUTANEOUS at 11:07

## 2019-01-01 RX ADMIN — ACETAMINOPHEN 325 MG: 325 TABLET ORAL at 04:06

## 2019-01-01 RX ADMIN — LACTULOSE 20 G: 20 SOLUTION ORAL at 08:07

## 2019-01-01 RX ADMIN — OXYCODONE HYDROCHLORIDE 10 MG: 5 TABLET ORAL at 01:06

## 2019-01-01 RX ADMIN — VASOPRESSIN 0.04 UNITS/MIN: 20 INJECTION INTRAVENOUS at 09:07

## 2019-01-01 RX ADMIN — VASOPRESSIN 20 UNITS: 20 INJECTION INTRAVENOUS at 09:07

## 2019-01-01 RX ADMIN — PANTOPRAZOLE SODIUM 40 MG: 40 INJECTION, POWDER, FOR SOLUTION INTRAVENOUS at 11:07

## 2019-01-01 RX ADMIN — SODIUM CHLORIDE, SODIUM LACTATE, POTASSIUM CHLORIDE, AND CALCIUM CHLORIDE 1000 ML: .6; .31; .03; .02 INJECTION, SOLUTION INTRAVENOUS at 05:07

## 2019-01-01 RX ADMIN — SODIUM CHLORIDE 1000 ML: 0.9 INJECTION, SOLUTION INTRAVENOUS at 02:07

## 2019-01-01 RX ADMIN — MORPHINE SULFATE 2 MG: 2 INJECTION, SOLUTION INTRAMUSCULAR; INTRAVENOUS at 02:06

## 2019-01-01 RX ADMIN — DEXTROSE: 10 SOLUTION INTRAVENOUS at 10:07

## 2019-01-01 RX ADMIN — INSULIN HUMAN 5 UNITS: 100 INJECTION, SOLUTION PARENTERAL at 01:07

## 2019-01-01 RX ADMIN — HEPARIN SODIUM AND DEXTROSE 400 UNITS/HR: 10000; 5 INJECTION INTRAVENOUS at 11:07

## 2019-01-01 RX ADMIN — ETOMIDATE 15 MG: 2 INJECTION INTRAVENOUS at 09:07

## 2019-01-01 RX ADMIN — ONDANSETRON 8 MG: 8 TABLET, ORALLY DISINTEGRATING ORAL at 02:06

## 2019-01-01 RX ADMIN — GADOBUTROL 10 ML: 604.72 INJECTION INTRAVENOUS at 05:06

## 2019-01-01 RX ADMIN — LIDOCAINE HYDROCHLORIDE 40 MG: 20 INJECTION, SOLUTION INTRAVENOUS at 02:07

## 2019-01-01 RX ADMIN — LACTULOSE 10 G: 20 SOLUTION ORAL at 12:07

## 2019-01-01 RX ADMIN — CEFTRIAXONE 2 G: 2 INJECTION, SOLUTION INTRAVENOUS at 02:06

## 2019-01-01 RX ADMIN — CALCIUM GLUCONATE 1 G: 98 INJECTION, SOLUTION INTRAVENOUS at 02:07

## 2019-01-01 RX ADMIN — INSULIN HUMAN 8.09 UNITS: 100 INJECTION, SOLUTION PARENTERAL at 01:07

## 2019-01-01 RX ADMIN — PANTOPRAZOLE SODIUM 40 MG: 40 INJECTION, POWDER, FOR SOLUTION INTRAVENOUS at 09:07

## 2019-01-01 RX ADMIN — FERROUS SULFATE TAB EC 325 MG (65 MG FE EQUIVALENT) 325 MG: 325 (65 FE) TABLET DELAYED RESPONSE at 09:06

## 2019-01-01 RX ADMIN — URSODIOL 300 MG: 300 CAPSULE ORAL at 08:07

## 2019-01-01 RX ADMIN — SODIUM BICARBONATE 150 ML/HR: 84 INJECTION, SOLUTION INTRAVENOUS at 10:07

## 2019-01-01 RX ADMIN — RAMELTEON 8 MG: 8 TABLET, FILM COATED ORAL at 12:07

## 2019-01-01 RX ADMIN — SUCRALFATE 1 G: 1 SUSPENSION ORAL at 11:07

## 2019-01-01 RX ADMIN — OXYCODONE HYDROCHLORIDE 10 MG: 5 TABLET ORAL at 09:07

## 2019-01-01 RX ADMIN — CHLORHEXIDINE GLUCONATE 0.12% ORAL RINSE 15 ML: 1.2 LIQUID ORAL at 09:07

## 2019-01-01 RX ADMIN — Medication 0.06 MCG/KG/MIN: at 09:07

## 2019-01-01 RX ADMIN — DEXTROSE 250 ML: 10 SOLUTION INTRAVENOUS at 09:07

## 2019-01-01 RX ADMIN — METRONIDAZOLE 500 MG: 500 TABLET ORAL at 06:06

## 2019-01-01 RX ADMIN — INSULIN HUMAN 8.09 UNITS: 100 INJECTION, SOLUTION PARENTERAL at 08:07

## 2019-01-01 RX ADMIN — STANDARDIZED SENNA CONCENTRATE AND DOCUSATE SODIUM 2 TABLET: 8.6; 5 TABLET, FILM COATED ORAL at 07:06

## 2019-01-01 RX ADMIN — PIPERACILLIN AND TAZOBACTAM 4.5 G: 4; .5 INJECTION, POWDER, LYOPHILIZED, FOR SOLUTION INTRAVENOUS; PARENTERAL at 09:07

## 2019-01-01 RX ADMIN — MORPHINE SULFATE 2 MG: 2 INJECTION, SOLUTION INTRAMUSCULAR; INTRAVENOUS at 08:06

## 2019-01-01 RX ADMIN — PIPERACILLIN AND TAZOBACTAM 4.5 G: 4; .5 INJECTION, POWDER, LYOPHILIZED, FOR SOLUTION INTRAVENOUS; PARENTERAL at 01:07

## 2019-01-01 RX ADMIN — METOCLOPRAMIDE 10 MG: 5 INJECTION, SOLUTION INTRAMUSCULAR; INTRAVENOUS at 03:07

## 2019-01-01 RX ADMIN — INSULIN HUMAN 8.09 UNITS: 100 INJECTION, SOLUTION PARENTERAL at 10:07

## 2019-01-01 RX ADMIN — SODIUM CHLORIDE, SODIUM LACTATE, POTASSIUM CHLORIDE, AND CALCIUM CHLORIDE 500 ML: .6; .31; .03; .02 INJECTION, SOLUTION INTRAVENOUS at 08:07

## 2019-06-20 PROBLEM — C79.9 METASTATIC CANCER: Status: ACTIVE | Noted: 2019-01-01

## 2019-06-20 PROBLEM — C22.0 HCC (HEPATOCELLULAR CARCINOMA): Status: ACTIVE | Noted: 2019-01-01

## 2019-06-20 NOTE — ED PROVIDER NOTES
"Encounter Date: 6/20/2019    SCRIBE #1 NOTE: I, Marianne Herrera, am scribing for, and in the presence of, Lanie Fraser MD. Other sections scribed: HPI, ROS, PE.       History     Chief Complaint   Patient presents with    Abdominal Pain     bloating x week reports small bowel movemens     Time patient was seen by the provider: 3:04 PM      The patient is a 53 y.o. male with co-morbidities including: history of knee and shoulder injury, who presents to the ED with a complaint of abdominal pain with associated symptoms of fever, chills, decreased appetite, decreased bowel movements, and abdominal distention that started approximately two weeks ago. Patient states that he has only been consuming one meal a today, due to his fear that he will "burst" if he consumes more. Patient endorses usually having a bowel movements 1-3 times a day but is only have one bowels movement a day. Patient states that the pain is exasperated by deep breaths. Patient has tried Gas-X tablets with no relief. Patient denies nausea, vomiting, weight loss, history of heavy alcohol use, and history of hepatitis B and C.     The history is provided by the patient.     Review of patient's allergies indicates:  No Known Allergies  History reviewed. No pertinent past medical history.  Past Surgical History:   Procedure Laterality Date    KNEE SURGERY      SHOULDER SURGERY       History reviewed. No pertinent family history.  Social History     Tobacco Use    Smoking status: Never Smoker    Smokeless tobacco: Never Used   Substance Use Topics    Alcohol use: Yes     Comment: rarely    Drug use: Never     Review of Systems   Constitutional: Positive for appetite change (decreased), chills and fever. Negative for unexpected weight change.   HENT: Negative for sore throat.    Respiratory: Negative for shortness of breath.    Cardiovascular: Negative for chest pain.   Gastrointestinal: Positive for abdominal distention and abdominal pain. " Negative for nausea and vomiting.        Positive for decreased bowel movements.   Genitourinary: Negative for dysuria.   Musculoskeletal: Negative for back pain.   Skin: Negative for rash.   Neurological: Negative for weakness.   Hematological: Does not bruise/bleed easily.       Physical Exam     Initial Vitals [06/20/19 1255]   BP Pulse Resp Temp SpO2   (!) 116/59 83 16 98.2 °F (36.8 °C) 99 %      MAP       --         Physical Exam    Nursing note and vitals reviewed.  Constitutional: He appears well-developed and well-nourished. No distress.   HENT:   Head: Normocephalic and atraumatic.   Eyes: EOM are normal. Pupils are equal, round, and reactive to light.   No scleral icterus.   Neck: Normal range of motion. Neck supple.   Cardiovascular: Normal rate, regular rhythm, normal heart sounds and intact distal pulses.   Pulmonary/Chest: Breath sounds normal. No respiratory distress.   Abdominal: Soft. Bowel sounds are normal. There is no tenderness.   Musculoskeletal: Normal range of motion. He exhibits no edema or tenderness.   Palpable firm mass in mid-upper gastris to lower-mid abdomen. No lower extremity edema.    Neurological: He is alert and oriented to person, place, and time.   Skin: Skin is warm and dry. No rash noted.         ED Course   Procedures  Labs Reviewed   COMPREHENSIVE METABOLIC PANEL - Abnormal; Notable for the following components:       Result Value    Albumin 2.7 (*)     Alkaline Phosphatase 509 (*)      (*)      (*)     All other components within normal limits   CBC W/ AUTO DIFFERENTIAL - Abnormal; Notable for the following components:    WBC 13.38 (*)     RBC 4.40 (*)     Hemoglobin 9.7 (*)     Hematocrit 34.0 (*)     Mean Corpuscular Volume 77 (*)     Mean Corpuscular Hemoglobin 22.0 (*)     Mean Corpuscular Hemoglobin Conc 28.5 (*)     RDW 15.3 (*)     Platelets 591 (*)     Gran # (ANC) 11.3 (*)     Immature Grans (Abs) 0.07 (*)     Lymph # 0.6 (*)     Mono # 1.3 (*)      Gran% 84.4 (*)     Lymph% 4.3 (*)     All other components within normal limits   CEA - Abnormal; Notable for the following components:    .3 (*)     All other components within normal limits   PROTIME-INR   MAGNESIUM          Imaging Results          CT Abdomen Pelvis With Contrast (Final result)  Result time 06/20/19 17:04:19    Final result by Charlie Fung MD (06/20/19 17:04:19)                 Impression:      Numerous ill-defined confluent masses throughout the liver may represent numerous metastasis or disseminated hepatocellular carcinoma.    Mild abdominopelvic ascites.  There is 2 enlarged possibly necrotic lymph nodes in the mariam hepatis.    No bowel dilation or urolithiasis.    Patulous appearing and circumferential wall thickening of the distal esophagus as could be seen with esophagitis.      Electronically signed by: Charlie Fung  Date:    06/20/2019  Time:    17:04             Narrative:    EXAMINATION:  CT ABDOMEN PELVIS WITH CONTRAST    CLINICAL HISTORY:  Abdominal distension;    TECHNIQUE:  Low dose axial images, sagittal and coronal reformations were obtained from the lung bases to the pubic symphysis following the IV administration of 75 mL of Omnipaque 350 without oral contrast.  Additional delayed scans are obtained.    COMPARISON:  None.    FINDINGS:  Chest: The visualized heart appears normal.  No pericardial effusion.  The distal esophagus is somewhat patulous with wall thickening up to 12 mm.  Findings could indicate esophagitis in the correct clinical setting.  No adenopathy in the visualized chest.  No pneumothorax or pleural effusion or interstitial edema is seen at the lung bases.    Abdomen: There are numerous ill-defined hypodense and confluent masses throughout the liver consistent with numerous metastasis.  Potentially disseminated hepatocellular carcinoma could have similar appearance.  Portal vein show normal early enhancement as does the splenic vein.  The SMV is  not definite show enhancement, perhaps related contrast timing.  There is a 1.5 cm and a 2.1 cm enlarged low-density lymph node at the mariam hepatis.  This could be necrotic adenopathy.    The pancreas appears normal without significant duct dilation.  The spleen and adrenal glands appear normal.  The abdominal aorta and IVC appear normal.  Kidneys enhance symmetrically appear normal.  No ureteral dilation or calculus found.  The gallbladder is somewhat contracted and may have wall thickening and or edema up to 6 mm, this is nonspecific in the setting of liver disease.  No gallstones seen.  Common bile duct measures about 3 mm within normal limits.  No definite intrahepatic duct dilation.    Pelvis: The urinary bladder appears normal.  There is small amount of free fluid in the inferior pelvis.  No inguinal hernia or pelvic adenopathy found.    Bowel and mesentery: Terminal ileum appears normal axial image 67.  The appendix is not identified.  No pericecal inflammation found.  No bowel dilation or wall thickening or pneumatosis seen.  No pattern of ileus or obstruction.  No definite colonic tumor identified.    Skeleton: The visualized ribs and femurs appear intact.  No osteonecrosis.  The visualized sacrum and SI joints appear normal for age.  No compression deformity or subluxation of the visualized spine.  No endplate erosion.  The                                 Medical Decision Making:   History:   Old Medical Records: I decided to obtain old medical records.  Initial Assessment:   54 yo healthy m, here with abd distention, early satiety x 2-3 weeks  No wt loss, fever, night sweats.  Decreased PO intake 2/2 above  No etoh/drugs/travel/pain meds    On exam, pt well-appearing with normal VS  Large abd mass palpable epigastric and RUQ/R mid abd  Differential Diagnosis:   Hepatomegaly vs maligancy  Independently Interpreted Test(s):   I have ordered and independently interpreted X-rays - see prior notes.  Clinical  Tests:   Lab Tests: Ordered and Reviewed  Radiological Study: Ordered and Reviewed  ED Management:  Labs  CT abd/pelvis  Unclear dispo pending CT results      5:24 PM  Significant LFT abnormalities, Fe deficiency anemia.  CT shows multiple lesions throughout liver, primary HCC or metastatic disease  Discussed results with pt.  He has no insurance, no f/u  Will need to admit for expedited work-up      I, Dr. Lanie Fraser, personally performed the services described in this documentation. All medical record entries made by the scribe were at my direction and in my presence.  I have reviewed the chart and agree that the record reflects my personal performance and is accurate and complete. Lanie Fraser MD.  3:39 PM 06/24/2019       Scribe Attestation:   Scribe #1: I performed the above scribed service and the documentation accurately describes the services I performed. I attest to the accuracy of the note.               Clinical Impression:       ICD-10-CM ICD-9-CM   1. Metastatic cancer C79.9 199.1                                Lanie Fraser MD  06/24/19 [id number]

## 2019-06-20 NOTE — ED NOTES
Pt requesting to go home to take care of some stuff before being admitted, admit team paged and will wait for further orders

## 2019-06-20 NOTE — ED NOTES
Pt left without waiting for admit team to call. Looked in restroom, waiting room and outside the ED. Will page admit team

## 2019-06-20 NOTE — ED TRIAGE NOTES
"Patient reports that he has had abdominal bloating/discomfort over the past two weeks. Reports that at one point, the bloating got better, but then started to get worse again. Patient reports he self-medicated at home with dulcolax and gas-x, had episodes of diarrhea, but now is having small, incomplete bowel movements with no relief. Reports back pain, fever, chills, and "pressure" in his abdomen. Denies nausea, vomiting, chest pain, swelling, headache or SOB.   "

## 2019-06-21 PROBLEM — R16.0 LIVER MASSES: Status: ACTIVE | Noted: 2019-01-01

## 2019-06-21 PROBLEM — R79.89 ELEVATED LFTS: Status: ACTIVE | Noted: 2019-01-01

## 2019-06-21 PROBLEM — D50.9 MICROCYTIC ANEMIA: Status: ACTIVE | Noted: 2019-01-01

## 2019-06-21 PROBLEM — D72.829 LEUKOCYTOSIS: Status: ACTIVE | Noted: 2019-01-01

## 2019-06-21 PROBLEM — E44.1 MILD PROTEIN MALNUTRITION: Status: ACTIVE | Noted: 2019-01-01

## 2019-06-21 NOTE — CONSULTS
"[facility]  Hepatology  Consult Note    Patient Name: Jr Marsh  MRN: [id number]  Admission Date: 6/20/2019  Hospital Length of Stay: 0 days  Attending Provider: William Storm MD   Primary Care Physician: Primary Doctor No  Principal Problem:HCC (hepatocellular carcinoma)    Inpatient consult to Hepatology  Consult performed by: Nina Davis MD  Consult ordered by: William Storm MD        Subjective:     HPI:  53 year old male with no significant past medical history on who hepatology is being consulted on for possible HCC.    Per HPI:  "Presents to the ED with a complaint of abdominal pain with associated symptoms of fever, chills, decreased appetite, decreased bowel movements, and abdominal distention that started approximately two weeks ago. Patient states that he has only been consuming one meal a today, due to his fear that he will "burst" if he consumes more. Patient endorses usually having a bowel movements 1-3 times a day but is only have one bowels movement a day. Patient states that the pain is exasperated by deep breaths. Patient has tried Gas-X tablets with no relief. Patient denies nausea, vomiting, weight loss, history of heavy alcohol use, and history of hepatitis B and C."    Interval History:  Patient feeling better this AM, both AFP/CA 19-9 elevated.  Born in the [place], lived in [place] until February.  Drank heavily in his 20's to the point where he received DUI's.  Denies any drugs use or new sexual partners.  Denies any personal or family history of liver disease.  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    Review of Systems   Constitutional: Positive for appetite change, chills, fatigue and fever. Negative for activity change, diaphoresis and unexpected weight change.   HENT: Negative for trouble swallowing and voice change.    Eyes: Negative for photophobia, pain, discharge, redness, itching and visual disturbance.   Respiratory: " Negative for cough and shortness of breath.    Cardiovascular: Negative for chest pain, palpitations and leg swelling.   Gastrointestinal: Positive for abdominal distention, abdominal pain and constipation. Negative for anal bleeding, blood in stool, diarrhea, nausea, rectal pain and vomiting.   Genitourinary: Negative for dysuria, frequency, hematuria and urgency.   Musculoskeletal: Negative for back pain and myalgias.   Neurological: Negative for dizziness, syncope, weakness and light-headedness.       History reviewed. No pertinent past medical history.    Past Surgical History:   Procedure Laterality Date    KNEE SURGERY      SHOULDER SURGERY         Family history of liver disease: No    Review of patient's allergies indicates:  No Known Allergies    Tobacco Use    Smoking status: Never Smoker    Smokeless tobacco: Never Used   Substance and Sexual Activity    Alcohol use: Yes     Comment: rarely    Drug use: Never    Sexual activity: Not on file       Medications Prior to Admission   Medication Sig Dispense Refill Last Dose    DULCOLAX, BISACODYL, ORAL Take 1 tablet by mouth daily as needed (constipation).       ibuprofen (ADVIL ORAL) Take 1 tablet by mouth every 6 (six) hours as needed (pain).       magnesium oxide-Mg AA chelate (MG-PLUS-PROTEIN) 133 mg Tab Take 1 tablet by mouth once daily.       polyethylene glycol (GLYCOLAX) 17 gram/dose powder Take 17 g by mouth daily as needed (constipation).       simethicone (GAS-X ORAL) Take 1 tablet by mouth daily as needed (gas).          Objective:     Vital Signs (Most Recent):  Temp: 98.2 °F (36.8 °C) (06/21/19 1250)  Pulse: 78 (06/21/19 1250)  Resp: 16 (06/21/19 1250)  BP: 112/69 (06/21/19 1250)  SpO2: 98 % (06/21/19 1250) Vital Signs (24h Range):  Temp:  [97.7 °F (36.5 °C)-100.2 °F (37.9 °C)] 98.2 °F (36.8 °C)  Pulse:  [74-91] 78  Resp:  [16-18] 16  SpO2:  [95 %-100 %] 98 %  BP: (105-125)/(58-76) 112/69     Weight: 75.1 kg (165 lb 9.1 oz) (06/21/19  1000)  Body mass index is 24.45 kg/m².    Physical Exam   Constitutional: He is oriented to person, place, and time. No distress.   HENT:   Head: Normocephalic and atraumatic.   Eyes: No scleral icterus.   Cardiovascular: Normal rate and regular rhythm.   Pulmonary/Chest: Effort normal and breath sounds normal.   Abdominal: Soft. Bowel sounds are normal. He exhibits distension. He exhibits no mass. There is no tenderness. There is no rebound and no guarding. No hernia.   Musculoskeletal: He exhibits no edema.   Neurological: He is alert and oriented to person, place, and time.   Skin: He is not diaphoretic.   Nursing note and vitals reviewed.      MELD-Na score: 7 at 6/21/2019  5:01 AM  MELD score: 7 at 6/21/2019  5:01 AM  Calculated from:  Serum Creatinine: 0.8 mg/dL (Rounded to 1 mg/dL) at 6/21/2019  5:01 AM  Serum Sodium: 133 mmol/L at 6/21/2019  5:01 AM  Total Bilirubin: 0.7 mg/dL (Rounded to 1 mg/dL) at 6/21/2019  5:01 AM  INR(ratio): 1.1 at 6/20/2019  3:16 PM  Age: 53 years    Significant Labs:  CBC:   Recent Labs   Lab 06/21/19  0501   WBC 11.36   RBC 3.80*   HGB 8.4*   HCT 28.4*   *     CMP:   Recent Labs   Lab 06/21/19  0501   GLU 79   CALCIUM 8.5*   ALBUMIN 2.3*   PROT 5.4*   *   K 4.3   CO2 23      BUN 14   CREATININE 0.8   ALKPHOS 467*   *   *   BILITOT 0.7     Coagulation:   Recent Labs   Lab 06/20/19  1516   INR 1.1       Significant Imaging:  X-Ray: Reviewed  CT: Reviewed    Assessment/Plan:     Liver masses  53 year old male with no significant past medical history on who hepatology is being consulted on for possible HCC. Based on imaging (not a triple phase) and tumor markers suspect possible HCC vs cholangiocarcinoma. At this point recommend additional imaging.    Recommendations:  --Daily CMP, CBC, and INR  --F/U acute hepatitis panel  --To CT chest add MRI Triple phase  --Heme-Onc recs appreciated        Thank you for your consult. I will follow-up with patient.  Please contact us if you have any additional questions.    Nina Davis M.D.  Gastroenterology Fellow, PGY-V  Pager: [phone]  [facility]

## 2019-06-21 NOTE — NURSING
Transferred from ER via wheelchair, admitted to room 07, patient AAOx4, ambulatory. Oriented to room, POC discussed, safety a discussed, call light within reach. Initial VS unremarkable, noted dry cough, breath sound clear. Feels hungry, complaint of slight abdominal discomfort. IV cannula flushed and was patent.  No orders yet from admitting team. Will continue to monitor.

## 2019-06-21 NOTE — PROGRESS NOTES
Paged on call Team L, re: spiking temperature 111.2, and slight hypotension.  AP Kavon Quigley returned the page, he will review the chart.

## 2019-06-21 NOTE — H&P
"History and Physical  Hospital Medicine       Patient Name: Jr Marsh  MRN:  [id number]  Hospital Medicine Team: Networked reference to record PCT  William Storm MD  Date of Admission:  6/20/2019     Principal Problem:  HCC (hepatocellular carcinoma)   Primary Care Physician: Primary Doctor No      History of Present Illness:     The patient is a 53 y.o. male with co-morbidities including: history of knee and shoulder injury, who presents to the ED with a complaint of abdominal pain with associated symptoms of fever, chills, decreased appetite, decreased bowel movements, and abdominal distention that started approximately two weeks ago. Patient states that he has only been consuming one meal a today, due to his fear that he will "burst" if he consumes more. Patient endorses usually having a bowel movements 1-3 times a day but is only have one bowels movement a day. Patient states that the pain is exasperated by deep breaths. Patient has tried Gas-X tablets with no relief. Patient denies nausea, vomiting, weight loss, history of heavy alcohol use, and history of hepatitis B and C.      Patient states he had a C-scope last year and it was normal.      Review of Systems   Constitutional: Negative for chills, fatigue, fever.   HENT: Negative for sore throat, trouble swallowing.    Eyes: Negative for photophobia, visual disturbance.   Respiratory: Negative for cough, shortness of breath.    Cardiovascular: Negative for chest pain, palpitations, leg swelling.   Gastrointestinal: Negative for abdominal pain, constipation, diarrhea, nausea, vomiting.   Endocrine: Negative for cold intolerance, heat intolerance.   Genitourinary: Negative for dysuria, frequency.   Musculoskeletal: Negative for arthralgias, myalgias.   Skin: Negative for rash, wound, erythema   Neurological: Negative for dizziness, syncope, weakness, light-headedness.   Psychiatric/Behavioral: Negative for confusion, hallucinations, anxiety  All other " systems reviewed and are negative.      Past Medical History: Patient has no past medical history on file.    Past Surgical History: Patient has a past surgical history that includes Knee surgery and Shoulder surgery.    Social History: Patient reports that he has never smoked. He has never used smokeless tobacco. He reports that he drinks alcohol. He reports that he does not use drugs.    Family History: family history is not on file.    Medications: Scheduled Meds:  Continuous Infusions:  PRN Meds:.acetaminophen, Dextrose 10% Bolus, Dextrose 10% Bolus, glucagon (human recombinant), glucose, glucose, ondansetron, sodium chloride 0.9%, sodium chloride 0.9%    Allergies: Patient has No Known Allergies.    Physical Exam:     Vital Signs (Most Recent):  Temp: 100.2 °F (37.9 °C) (06/21/19 0002)  Pulse: 88 (06/21/19 0002)  Resp: 18 (06/21/19 0002)  BP: (!) 105/58 (06/21/19 0002)  SpO2: 95 % (06/21/19 0002) Vital Signs Range (Last 24H):  Temp:  [98.2 °F (36.8 °C)-100.2 °F (37.9 °C)]   Pulse:  [74-91]   Resp:  [16-18]   BP: (105-125)/(58-76)   SpO2:  [95 %-100 %]    Body mass index is 24.37 kg/m².     Physical Exam:  Constitutional: Appears well-developed and well-nourished.   Head: Normocephalic and atraumatic.   Mouth/Throat: Oropharynx is clear and moist.   Eyes: EOM are normal. Pupils are equal, round, and reactive to light. No scleral icterus.   Neck: Normal range of motion. Neck supple.   Cardiovascular: Normal rate and regular rhythm.  No murmur heard.  Pulmonary/Chest: Effort normal and breath sounds normal. No respiratory distress. No wheezes, rales, or rhonchi  Abdominal: Soft. Bowel sounds are normal.  No distension or tenderness  Musculoskeletal: Normal range of motion. No edema.   Neurological: Alert and oriented to person, place, and time.   Skin: Skin is warm and dry.   Psychiatric: Normal mood and affect. Behavior is normal.   Vitals reviewed.    Recent Labs   Lab 06/20/19  1516   WBC 13.38*   HGB 9.7*    HCT 34.0*   *       Recent Labs   Lab 06/20/19  1516      K 4.6      CO2 24   BUN 10   CREATININE 0.8   GLU 75   CALCIUM 9.0   MG 2.1     Recent Labs   Lab 06/20/19  1516   ALKPHOS 509*   *   *   ALBUMIN 2.7*   PROT 6.2   BILITOT 0.9   INR 1.1      No results for input(s): POCTGLUCOSE in the last 168 hours.      Assessment and Plan:     Mr. Jr Marsh is a 53 y.o. male who presented to [facility] on 6/20/2019 with     Active Hospital Problems    Diagnosis  POA    *HCC (hepatocellular carcinoma) [C22.0]  Yes    Elevated LFTs [R94.5]  Yes    Microcytic anemia [D50.9]  Yes    Leukocytosis [D72.829]  Yes    Mild protein malnutrition [E44.1]  Yes    Metastatic cancer [C79.9]  Yes      Resolved Hospital Problems   No resolved problems to display.     # Metastatic cancer, suspected HCC  # Elevated LFTs  - patient's CT ab/pelv is concern for metastatic HCC  - checking AFP, CEA and  markedly elevated;   - C-scope last year negative for malignancy  - CT chest planned for tomorrow  - hepatology and heme/onc consult  - will make NPO for either IR liver biopsy versus EUS with biopsy    # Leukocytosis   - unclear etiology  - infectious work up; afebrile    # Microcytic anemia  - iron panel and ferritin     # Mild protein gage malnutrition   - PAB and dietary        Diet:  npo  GI PPx:    DVT PPx:    Goals of Care:  full      Disposition:  Weekend; patient needs close follow up    William Storm MD  Medical Director [facility]  Spectra:  [phone]  Pager: [phone]

## 2019-06-21 NOTE — ASSESSMENT & PLAN NOTE
53 year old male with no significant past medical history on who hepatology is being consulted on for possible HCC. Based on imaging (not a triple phase) and tumor markers suspect possible HCC vs cholangiocarcinoma. At this point recommend additional imaging.    Recommendations:  --Daily CMP, CBC, and INR  --F/U acute hepatitis panel  --To CT chest add MRI Triple phase  --Heme-Onc recs appreciated

## 2019-06-21 NOTE — PLAN OF CARE
Problem: Adult Inpatient Plan of Care  Goal: Plan of Care Review  Outcome: Ongoing (interventions implemented as appropriate)  Recommendations     1. When medically feasible, resume Regular diet.               - Add Boost Plus ONS (chocolate) to aid in caloric intake.   2. RD to monitor & follow-up.

## 2019-06-21 NOTE — SUBJECTIVE & OBJECTIVE
Review of Systems   Constitutional: Positive for appetite change, chills, fatigue and fever. Negative for activity change, diaphoresis and unexpected weight change.   HENT: Negative for trouble swallowing and voice change.    Eyes: Negative for photophobia, pain, discharge, redness, itching and visual disturbance.   Respiratory: Negative for cough and shortness of breath.    Cardiovascular: Negative for chest pain, palpitations and leg swelling.   Gastrointestinal: Positive for abdominal distention, abdominal pain and constipation. Negative for anal bleeding, blood in stool, diarrhea, nausea, rectal pain and vomiting.   Genitourinary: Negative for dysuria, frequency, hematuria and urgency.   Musculoskeletal: Negative for back pain and myalgias.   Neurological: Negative for dizziness, syncope, weakness and light-headedness.       History reviewed. No pertinent past medical history.    Past Surgical History:   Procedure Laterality Date    KNEE SURGERY      SHOULDER SURGERY         Family history of liver disease: No    Review of patient's allergies indicates:  No Known Allergies    Tobacco Use    Smoking status: Never Smoker    Smokeless tobacco: Never Used   Substance and Sexual Activity    Alcohol use: Yes     Comment: rarely    Drug use: Never    Sexual activity: Not on file       Medications Prior to Admission   Medication Sig Dispense Refill Last Dose    DULCOLAX, BISACODYL, ORAL Take 1 tablet by mouth daily as needed (constipation).       ibuprofen (ADVIL ORAL) Take 1 tablet by mouth every 6 (six) hours as needed (pain).       magnesium oxide-Mg AA chelate (MG-PLUS-PROTEIN) 133 mg Tab Take 1 tablet by mouth once daily.       polyethylene glycol (GLYCOLAX) 17 gram/dose powder Take 17 g by mouth daily as needed (constipation).       simethicone (GAS-X ORAL) Take 1 tablet by mouth daily as needed (gas).          Objective:     Vital Signs (Most Recent):  Temp: 98.2 °F (36.8 °C) (06/21/19 1250)  Pulse:  78 (06/21/19 1250)  Resp: 16 (06/21/19 1250)  BP: 112/69 (06/21/19 1250)  SpO2: 98 % (06/21/19 1250) Vital Signs (24h Range):  Temp:  [97.7 °F (36.5 °C)-100.2 °F (37.9 °C)] 98.2 °F (36.8 °C)  Pulse:  [74-91] 78  Resp:  [16-18] 16  SpO2:  [95 %-100 %] 98 %  BP: (105-125)/(58-76) 112/69     Weight: 75.1 kg (165 lb 9.1 oz) (06/21/19 1000)  Body mass index is 24.45 kg/m².    Physical Exam   Constitutional: He is oriented to person, place, and time. No distress.   HENT:   Head: Normocephalic and atraumatic.   Eyes: No scleral icterus.   Cardiovascular: Normal rate and regular rhythm.   Pulmonary/Chest: Effort normal and breath sounds normal.   Abdominal: Soft. Bowel sounds are normal. He exhibits distension. He exhibits no mass. There is no tenderness. There is no rebound and no guarding. No hernia.   Musculoskeletal: He exhibits no edema.   Neurological: He is alert and oriented to person, place, and time.   Skin: He is not diaphoretic.   Nursing note and vitals reviewed.      MELD-Na score: 7 at 6/21/2019  5:01 AM  MELD score: 7 at 6/21/2019  5:01 AM  Calculated from:  Serum Creatinine: 0.8 mg/dL (Rounded to 1 mg/dL) at 6/21/2019  5:01 AM  Serum Sodium: 133 mmol/L at 6/21/2019  5:01 AM  Total Bilirubin: 0.7 mg/dL (Rounded to 1 mg/dL) at 6/21/2019  5:01 AM  INR(ratio): 1.1 at 6/20/2019  3:16 PM  Age: 53 years    Significant Labs:  CBC:   Recent Labs   Lab 06/21/19  0501   WBC 11.36   RBC 3.80*   HGB 8.4*   HCT 28.4*   *     CMP:   Recent Labs   Lab 06/21/19  0501   GLU 79   CALCIUM 8.5*   ALBUMIN 2.3*   PROT 5.4*   *   K 4.3   CO2 23      BUN 14   CREATININE 0.8   ALKPHOS 467*   *   *   BILITOT 0.7     Coagulation:   Recent Labs   Lab 06/20/19  1516   INR 1.1       Significant Imaging:  X-Ray: Reviewed  CT: Reviewed

## 2019-06-21 NOTE — CONSULTS
"  [facility]  Adult Nutrition  Consult Note    SUMMARY     Recommendations    1. When medically feasible, resume Regular diet.    - Add Boost Plus ONS (chocolate) to aid in caloric intake.   2. RD to monitor & follow-up.    Goals: PO intake >50%  Nutrition Goal Status: new  Communication of RD Recs: reviewed with RN    Reason for Assessment    Reason For Assessment: consult  Diagnosis: other (see comments)([facility])  Interdisciplinary Rounds: did not attend    General Information Comments: Pt currently NPO for chest CT. Pt consumed 100% of dinner last night, tolerated. Pt willing to try ONS upon diet advancement. PTA x 1 month, pt reports only consuming 1 meal/day 2/2 early satiety & bloating. Pt reports UBW is 165# x 1 year. NFPE complete, pt w/ no physical signs of malnutrition. Albumin & Prealbumin shouldn't be used as nutrition indicators.  Nutrition Discharge Planning: Adequate PO intake    Nutrition/Diet History    Patient Reported Diet/Restrictions/Preferences: general  Spiritual, Cultural Beliefs, Sabianist Practices, Values that Affect Care: no  Factors Affecting Nutritional Intake: NPO    Anthropometrics    Temp: 98.8 °F (37.1 °C)  Height Method: Stated  Height: 5' 9" (175.3 cm)  Height (inches): 69 in  Weight Method: Standard Scale  Weight: 75.1 kg (165 lb 9.1 oz)  Weight (lb): 165.57 lb  Ideal Body Weight (IBW), Male: 160 lb  % Ideal Body Weight, Male (lb): 103.48 lb  BMI (Calculated): 24.5  BMI Grade: 18.5-24.9 - normal    Lab/Procedures/Meds    Pertinent Labs Reviewed: reviewed  Pertinent Labs Comments: Na 133  Pertinent Medications Reviewed: reviewed    Estimated/Assessed Needs    Weight Used For Calorie Calculations: 75.1 kg (165 lb 9.1 oz)     Energy Calorie Requirements (kcal): 2253 kcal/d   Energy Need Method: Kcal/kg(30 kcal/kg)     Protein Requirements: 98 g/d (1.3 g/kg)  Weight Used For Protein Calculations: 75.1 kg (165 lb 9.1 oz)     Estimated Fluid Requirement Method: other " (see comments)(Per MD or 1 mL/kcal)     Nutrition Prescription Ordered    Current Diet Order: NPO  Nutrition Order Comments: Was on Regular diet w/ adequate PO intake    Evaluation of Received Nutrient/Fluid Intake    Comments: LBM: 6/19    Tolerance: tolerating    Nutrition Risk    Level of Risk/Frequency of Follow-up: (1x/week)     Assessment and Plan    Nutrition Problem  Increased nutrient needs     Related to (etiology):   Physiological causes     Signs and Symptoms (as evidenced by):   New diagnosis of HCC    Interventions/Recommendations (treatment strategy):  Collaboration of care     Nutrition Diagnosis Status:   New     Monitor and Evaluation    Food and Nutrient Intake: energy intake, food and beverage intake  Food and Nutrient Adminstration: diet order  Physical Activity and Function: nutrition-related ADLs and IADLs  Anthropometric Measurements: weight, weight change  Biochemical Data, Medical Tests and Procedures: inflammatory profile, lipid profile, gastrointestinal profile, glucose/endocrine profile, electrolyte and renal panel  Nutrition-Focused Physical Findings: overall appearance     Nutrition Follow-Up    RD Follow-up?: Yes

## 2019-06-21 NOTE — PHARMACY MED REC
"  Admission Medication Reconciliation - Pharmacy Consult Note    The home medication history was taken by Nicole Alexander, Pharmacy Technician.    You may go to "Admission" then "Reconcile Home Medications" tabs to review and/or act upon these items.      No issues noted with the medication reconciliation.    Minnie Coles, PharmD, BCPS  [id number]                .    .          "

## 2019-06-21 NOTE — CONSULTS
Consult Note    Inpatient consult to Hematology/Oncology  Consult performed by: Henry Thompson MD  Consult ordered by: William Storm MD        SUBJECTIVE:     History of Present Illness:  Patient is a 53 y.o. Iranian male presents without any PMHx who presents to the hospital due to abdominal pain with fevers, chills, decreased  appetite, constipation, abdominal distention/pain which started two weeks ago. The pt has been consuming about one meal a day due to severe pain if he continues. He had been having less BM, with about one a day now from three a day previously.The patient has had Gas-X which he he tried over the last week; this weekend he tried dulcolax and miralax and and subsequent loose stools without significant relief. Denies nausea, vomiting; endorses that abdominal pain does worsen with deep breaths. At home, he had taken Advil every 6 - 8 hours for pain relief. Pt has not had any weight loss. Denies any tobacco use. He had been drinking in his 20s-30s, about a 6 pack of beer on weekends; pt denies any significant drinking since 2003. He does not have any personal or family hx of cancer. 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      He had a colonoscopy in [place], [place], which was apparently negative at the time. Patient has friends here for good support but states that all of his family is back in [place].       Review of patient's allergies indicates:  No Known Allergies  History reviewed. No pertinent past medical history.  Past Surgical History:   Procedure Laterality Date    KNEE SURGERY      SHOULDER SURGERY       History reviewed. No pertinent family history.  Social History     Tobacco Use    Smoking status: Never Smoker    Smokeless tobacco: Never Used   Substance Use Topics    Alcohol use: Yes     Comment: rarely    Drug use: Never     Review of Systems   Constitutional: Positive for chills, fever and malaise/fatigue. Negative for weight loss.   Respiratory:  Negative for shortness of breath.    Cardiovascular: Negative for chest pain and leg swelling.   Gastrointestinal: Positive for abdominal pain, constipation, nausea and vomiting.   Skin: Negative for rash.   Neurological: Negative for seizures and headaches.     OBJECTIVE:     Vital Signs:  Temp:  [97.7 °F (36.5 °C)-98.8 °F (37.1 °C)]   Pulse:  [78-84]   Resp:  [16-18]   BP: (111-123)/(68-70)   SpO2:  [96 %-98 %]     Physical Exam   Constitutional: He is oriented to person, place, and time. He appears well-developed and well-nourished.   HENT:   Head: Normocephalic and atraumatic.   Eyes: Pupils are equal, round, and reactive to light. Conjunctivae and EOM are normal.   Neck: Normal range of motion. Neck supple.   Cardiovascular: Normal rate, regular rhythm and normal heart sounds. Exam reveals no gallop and no friction rub.   No murmur heard.  Pulmonary/Chest: Effort normal and breath sounds normal. No respiratory distress. He has no wheezes. He has no rales.   Abdominal: Soft. Bowel sounds are normal. He exhibits no distension. There is no tenderness. There is no guarding.   Musculoskeletal: Normal range of motion. He exhibits no edema.   Neurological: He is alert and oriented to person, place, and time. No cranial nerve deficit.   Skin: Skin is warm and dry. No rash noted.     Laboratory:  CBC:   Recent Labs   Lab 06/21/19  0501   WBC 11.36   RBC 3.80*   HGB 8.4*   HCT 28.4*   *   MCV 75*   MCH 22.1*   MCHC 29.6*     BMP:   Recent Labs   Lab 06/21/19  0501   GLU 79   *   K 4.3      CO2 23   BUN 14   CREATININE 0.8   CALCIUM 8.5*   MG 2.0     CMP:   Recent Labs   Lab 06/21/19  0501   GLU 79   CALCIUM 8.5*   ALBUMIN 2.3*   PROT 5.4*   *   K 4.3   CO2 23      BUN 14   CREATININE 0.8   ALKPHOS 467*   *   *   BILITOT 0.7     LFTs:   Recent Labs   Lab 06/21/19  0501   *   *   ALKPHOS 467*   BILITOT 0.7   PROT 5.4*   ALBUMIN 2.3*     Coagulation:   Recent Labs    Lab 06/20/19  1516   LABPROT 11.4   INR 1.1     Cardiac markers: No results for input(s): CKMB, CPKMB, TROPONINT, TROPONINI, MYOGLOBIN in the last 168 hours.  ABGs: No results for input(s): PH, PCO2, PO2, HCO3, POCSATURATED, BE in the last 168 hours.  Microbiology Results (last 7 days)     ** No results found for the last 168 hours. **        Specimen (12h ago, onward)    None        Recent Labs   Lab 06/21/19  0133   COLORU Yellow   SPECGRAV 1.030   PHUR 6.0   PROTEINUA Negative   NITRITE Negative   LEUKOCYTESUR Negative       Diagnostic Results:  EXAMINATION:  CT ABDOMEN PELVIS WITH CONTRAST    CLINICAL HISTORY:  Abdominal distension;    TECHNIQUE:  Low dose axial images, sagittal and coronal reformations were obtained from the lung bases to the pubic symphysis following the IV administration of 75 mL of Omnipaque 350 without oral contrast.  Additional delayed scans are obtained.    COMPARISON:  None.    FINDINGS:  Chest: The visualized heart appears normal.  No pericardial effusion.  The distal esophagus is somewhat patulous with wall thickening up to 12 mm.  Findings could indicate esophagitis in the correct clinical setting.  No adenopathy in the visualized chest.  No pneumothorax or pleural effusion or interstitial edema is seen at the lung bases.    Abdomen: There are numerous ill-defined hypodense and confluent masses throughout the liver consistent with numerous metastasis.  Potentially disseminated hepatocellular carcinoma could have similar appearance.  Portal vein show normal early enhancement as does the splenic vein.  The SMV is not definite show enhancement, perhaps related contrast timing.  There is a 1.5 cm and a 2.1 cm enlarged low-density lymph node at the mariam hepatis.  This could be necrotic adenopathy.    The pancreas appears normal without significant duct dilation.  The spleen and adrenal glands appear normal.  The abdominal aorta and IVC appear normal.  Kidneys enhance symmetrically  appear normal.  No ureteral dilation or calculus found.  The gallbladder is somewhat contracted and may have wall thickening and or edema up to 6 mm, this is nonspecific in the setting of liver disease.  No gallstones seen.  Common bile duct measures about 3 mm within normal limits.  No definite intrahepatic duct dilation.    Pelvis: The urinary bladder appears normal.  There is small amount of free fluid in the inferior pelvis.  No inguinal hernia or pelvic adenopathy found.    Bowel and mesentery: Terminal ileum appears normal axial image 67.  The appendix is not identified.  No pericecal inflammation found.  No bowel dilation or wall thickening or pneumatosis seen.  No pattern of ileus or obstruction.  No definite colonic tumor identified.    Skeleton: The visualized ribs and femurs appear intact.  No osteonecrosis.  The visualized sacrum and SI joints appear normal for age.  No compression deformity or subluxation of the visualized spine.  No endplate erosion.  The      Impression       Numerous ill-defined confluent masses throughout the liver may represent numerous metastasis or disseminated hepatocellular carcinoma.    Mild abdominopelvic ascites.  There is 2 enlarged possibly necrotic lymph nodes in the mariam hepatis.    No bowel dilation or urolithiasis.    Patulous appearing and circumferential wall thickening of the distal esophagus as could be seen with esophagitis.      Electronically signed by: Charlie Fung  Date: 06/20/2019  Time: 17:04            ASSESSMENT/PLAN:     Plan:    Suspected metastatic GI malignancy, unknown origin  - ECOG PS 0  - distal esophageal thickening, 12 mm; unclear if this is esophagitis  - numerous ill-defined hypodense, confluent masses in liver consistent with metastases versus disseminated HCC  - 1.5 cm and a 2.1 cm enlarged low-density lymph node at the mariam hepatis, possible necrotic adenopahty  - small free fluid in inferior pelvis without any inguinal/ pelvic  adenopathy  - no obvious pancreatic or colonic lesion noted on scan  - review of tumor markers reveals multiple possible origins: AFP of 974, CA 19-9 > 74006, CEA of 158.3  - noted plans for MRI abdomen  - would plan to biopsy one of liver lesions, so that STRATA or additional molecular testing could be completed for any possible targeted therapy  - Please discuss with IR about completion of biopsy as outpatient ASAP and we will help follow patient in Oncology clinic  - will also plan for PET-CT as outpatient    Neoplasm related pain  - would recommend oral pain meds PRN to assist with pain control.    Please call with any additional questions, discussed with Dr. Arce.    Henry Thompson MD  Hematology/Oncology Fellow, PGY IV  [facility]    Attending Note  I have personally taken the history and examined this patient and agree with the fellow's note as stated above.  Discussed biopsy with patient  We will await results to determine further plan.

## 2019-06-21 NOTE — PLAN OF CARE
06/21/19 1319   Discharge Assessment   Assessment Type Discharge Planning Assessment   Confirmed/corrected address and phone number on facesheet? Yes   Assessment information obtained from? Patient   Communicated expected length of stay with patient/caregiver yes   Prior to hospitilization cognitive status: Alert/Oriented   Prior to hospitalization functional status: Independent   Current cognitive status: Alert/Oriented   Current Functional Status: Independent   Facility Arrived From: [address] [place] [address]. Bedroom downstairs   Lives With friend(s)  (friends are in [facility] for a month)   Able to Return to Prior Arrangements yes   Is patient able to care for self after discharge? Yes   Who are your caregiver(s) and their phone number(s)? Caroline English(mother) [phone]   Patient's perception of discharge disposition admitted as an inpatient   Readmission Within the Last 30 Days unable to assess   Patient currently being followed by outpatient case management? No   Patient currently receives any other outside agency services? No   Equipment Currently Used at Home none   Do you have any problems affording any of your prescribed medications? No   Is the patient taking medications as prescribed? yes   Does the patient have transportation home? Yes   Transportation Anticipated car, drives self   Does the patient receive services at the Coumadin Clinic? No   Discharge Plan A Home   Discharge Plan B Home   DME Needed Upon Discharge  none   Patient/Family in Agreement with Plan yes   From [place] So no PCP.   He said he could use [facility] if needed on [facility].

## 2019-06-21 NOTE — HPI
"53 year old male with no significant past medical history on who hepatology is being consulted on for possible HCC.    Per HPI:  "Presents to the ED with a complaint of abdominal pain with associated symptoms of fever, chills, decreased appetite, decreased bowel movements, and abdominal distention that started approximately two weeks ago. Patient states that he has only been consuming one meal a today, due to his fear that he will "burst" if he consumes more. Patient endorses usually having a bowel movements 1-3 times a day but is only have one bowels movement a day. Patient states that the pain is exasperated by deep breaths. Patient has tried Gas-X tablets with no relief. Patient denies nausea, vomiting, weight loss, history of heavy alcohol use, and history of hepatitis B and C."    Interval History:  Patient feeling better this AM, both AFP/CA 19-9 elevated.  Born in the [facility], lived in [place] until February.  Drank heavily in his 20's to the point where he received DUI's.  Denies any drugs use or new sexual partners.  Denies any personal or family history of liver disease.  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  "

## 2019-06-22 PROBLEM — C22.1 CHOLANGIOCARCINOMA: Status: ACTIVE | Noted: 2019-01-01

## 2019-06-22 NOTE — PROGRESS NOTES
Progress Note   Hospital Medicine         Patient Name: Jr Marsh  MRN:  [id number]  [facility] Medicine Team: [facility] MED L William Storm MD  Date of Admission:  6/20/2019     Length of Stay:  LOS: 0 days   Expected Discharge Date: 6/21/2019  Principal Problem:  Cholangiocarcinoma       Subjective:     Interval History/Overnight Events:  Patient doing well today; no acute issues over night; CT chest does not show any metastatic lesions; planning for U/S perc liver biopsy on monday    Review of Systems   Constitutional: Negative for chills, fatigue, fever.   HENT: Negative for sore throat, trouble swallowing.    Eyes: Negative for photophobia, visual disturbance.   Respiratory: Negative for cough, shortness of breath.    Cardiovascular: Negative for chest pain, palpitations, leg swelling.   Gastrointestinal: Negative for abdominal pain, constipation, diarrhea, nausea, vomiting.   Endocrine: Negative for cold intolerance, heat intolerance.   Genitourinary: Negative for dysuria, frequency.   Musculoskeletal: Negative for arthralgias, myalgias.   Skin: Negative for rash, wound, erythema   Neurological: Negative for dizziness, syncope, weakness, light-headedness.   Psychiatric/Behavioral: Negative for confusion, hallucinations, anxiety  All other systems reviewed and are negative.    Objective:     Temp:  [98.4 °F (36.9 °C)-99.1 °F (37.3 °C)]   Pulse:  [70-91]   Resp:  [15-18]   BP: (110-137)/(57-84)   SpO2:  [58 %-98 %]       Physical Exam:  Constitutional: Appears well-developed and well-nourished.   Head: Normocephalic and atraumatic.   Mouth/Throat: Oropharynx is clear and moist.   Eyes: EOM are normal. Pupils are equal, round, and reactive to light. No scleral icterus.   Neck: Normal range of motion. Neck supple.   Cardiovascular: Normal rate and regular rhythm.  No murmur heard.  Pulmonary/Chest: Effort normal and breath sounds normal. No respiratory distress. No wheezes, rales, or rhonchi  Abdominal: Soft.  Bowel sounds are normal.  No distension or tenderness  Musculoskeletal: Normal range of motion. No edema.   Neurological: Alert and oriented to person, place, and time.   Skin: Skin is warm and dry.   Psychiatric: Normal mood and affect. Behavior is normal.     Recent Labs   Lab 06/20/19  1516 06/21/19  0501 06/22/19  0347   WBC 13.38* 11.36 10.15   HGB 9.7* 8.4* 8.1*   HCT 34.0* 28.4* 28.2*   * 552* 524*     Recent Labs   Lab 06/20/19  1516 06/21/19  0501 06/22/19  0347    133* 137   K 4.6 4.3 4.4    101 103   CO2 24 23 27   BUN 10 14 14   CREATININE 0.8 0.8 0.8   GLU 75 79 93   CALCIUM 9.0 8.5* 7.9*   MG 2.1 2.0 2.0   PHOS  --  3.2 2.7     Recent Labs   Lab 06/20/19  1516 06/21/19  0501 06/22/19  0347   ALKPHOS 509* 467* 474*   * 114* 119*   * 140* 134*   ALBUMIN 2.7* 2.3* 2.2*   PROT 6.2 5.4* 5.2*   BILITOT 0.9 0.7 0.7   INR 1.1  --   --      Recent Labs   Lab 06/21/19  0544 06/21/19  0804 06/21/19  1103   POCTGLUCOSE 96 81 79        enoxaparin  40 mg Subcutaneous Daily       Assessment and Plan     Mr. Jr Marsh is a 53 y.o. male who presented to [facility] on 6/20/2019 with     Hospital Course:    Mr. Jr Marsh was admitted to Hospital Medicine for management of     Active Hospital Problems    Diagnosis  POA    *Cholangiocarcinoma [C22.1]  Yes    Elevated LFTs [R94.5]  Yes    Microcytic anemia [D50.9]  Yes    Leukocytosis [D72.829]  Yes    Mild protein malnutrition [E44.1]  Yes    Liver masses [R16.0]  Yes    Metastatic cancer [C79.9]  Yes      Resolved Hospital Problems   No resolved problems to display.     # Metastatic cancer  # Cholangiocarcinoma   # Elevated LFTs  - patient's CT ab/pelv is concern for metastatic HCC  - all tumor markers elevated and CA 19-9 markedly elevated   - C-scope last year negative for malignancy  - CT chest shows no metastatic lesions   - hepatology and heme/onc consult  - MRI abdomen suspicious for cholangio  - planning for liver  biopsy on Monday      # Leukocytosis   - unclear etiology  - infectious work up; afebrile     # Microcytic anemia  - iron panel and ferritin      # Mild protein gage malnutrition   - PAB and dietary         Diet:  regular   GI PPx:    DVT PPx:    Goals of Care:  full        Disposition:  monday after biopsy with heme/onc follow up     William Storm MD  Medical Director [facility] Medicine  Spectra:  [phone]  Pager: [phone]

## 2019-06-22 NOTE — PLAN OF CARE
Problem: Adult Inpatient Plan of Care  Goal: Plan of Care Review  Outcome: Ongoing (interventions implemented as appropriate)  MRI completed. Patient in good spirits. Morphine given x1 for pain

## 2019-06-22 NOTE — PROGRESS NOTES
Progress Note   Hospital Medicine         Patient Name: Jr Marsh  MRN:  [id number]  [facility] Medicine Team: [facility]OSP MED L William Storm MD  Date of Admission:  6/20/2019     Length of Stay:  LOS: 0 days   Expected Discharge Date: 6/21/2019  Principal Problem:  HCC (hepatocellular carcinoma)       Subjective:     Interval History/Overnight Events:  Patient doing well today; MRI abdomen ordered; heme/onc consulted and would like tissue diagnosis for patient; getting CT chest pending; plan for liver biopsy on Monday and d/c after that;     Review of Systems   Constitutional: Negative for chills, fatigue, fever.   HENT: Negative for sore throat, trouble swallowing.    Eyes: Negative for photophobia, visual disturbance.   Respiratory: Negative for cough, shortness of breath.    Cardiovascular: Negative for chest pain, palpitations, leg swelling.   Gastrointestinal: Negative for abdominal pain, constipation, diarrhea, nausea, vomiting.   Endocrine: Negative for cold intolerance, heat intolerance.   Genitourinary: Negative for dysuria, frequency.   Musculoskeletal: Negative for arthralgias, myalgias.   Skin: Negative for rash, wound, erythema   Neurological: Negative for dizziness, syncope, weakness, light-headedness.   Psychiatric/Behavioral: Negative for confusion, hallucinations, anxiety  All other systems reviewed and are negative.    Objective:     Temp:  [97.7 °F (36.5 °C)-98.9 °F (37.2 °C)]   Pulse:  [74-91]   Resp:  [15-18]   BP: (109-137)/(60-84)   SpO2:  [96 %-99 %]       Physical Exam:  Constitutional: Appears well-developed and well-nourished.   Head: Normocephalic and atraumatic.   Mouth/Throat: Oropharynx is clear and moist.   Eyes: EOM are normal. Pupils are equal, round, and reactive to light. No scleral icterus.   Neck: Normal range of motion. Neck supple.   Cardiovascular: Normal rate and regular rhythm.  No murmur heard.  Pulmonary/Chest: Effort normal and breath sounds normal. No respiratory  distress. No wheezes, rales, or rhonchi  Abdominal: Soft. Bowel sounds are normal.  No distension or tenderness  Musculoskeletal: Normal range of motion. No edema.   Neurological: Alert and oriented to person, place, and time.   Skin: Skin is warm and dry.   Psychiatric: Normal mood and affect. Behavior is normal.     Recent Labs   Lab 06/20/19  1516 06/21/19  0501   WBC 13.38* 11.36   HGB 9.7* 8.4*   HCT 34.0* 28.4*   * 552*     Recent Labs   Lab 06/20/19  1516 06/21/19  0501    133*   K 4.6 4.3    101   CO2 24 23   BUN 10 14   CREATININE 0.8 0.8   GLU 75 79   CALCIUM 9.0 8.5*   MG 2.1 2.0   PHOS  --  3.2     Recent Labs   Lab 06/20/19  1516 06/21/19  0501   ALKPHOS 509* 467*   * 114*   * 140*   ALBUMIN 2.7* 2.3*   PROT 6.2 5.4*   BILITOT 0.9 0.7   INR 1.1  --      Recent Labs   Lab 06/21/19  0544 06/21/19  0804 06/21/19  1103   POCTGLUCOSE 96 81 79        enoxaparin  40 mg Subcutaneous Daily       Assessment and Plan     Mr. Jr Marsh is a 53 y.o. male who presented to [facility] on 6/20/2019 with     Hospital Course:    Mr. Jr Marsh was admitted to Hospital Medicine for management of     Active Hospital Problems    Diagnosis  POA    *HCC (hepatocellular carcinoma) [C22.0]  Yes    Elevated LFTs [R94.5]  Yes    Microcytic anemia [D50.9]  Yes    Leukocytosis [D72.829]  Yes    Mild protein malnutrition [E44.1]  Yes    Liver masses [R16.0]  Yes    Metastatic cancer [C79.9]  Yes      Resolved Hospital Problems   No resolved problems to display.     # Metastatic cancer, suspected HCC  # Elevated LFTs  - patient's CT ab/pelv is concern for metastatic HCC  - all tumor markers elevated and CA 19-9 markedly elevated   - C-scope last year negative for malignancy  - CT chest planned for tomorrow  - hepatology and heme/onc consult  - MRI abdomen pending  - planning for liver biopsy on Monday      # Leukocytosis   - unclear etiology  - infectious work up; afebrile     # Microcytic  anemia  - iron panel and ferritin      # Mild protein gage malnutrition   - PAB and dietary         Diet:  npo  GI PPx:    DVT PPx:    Goals of Care:  full        Disposition:  monday after biopsy with heme/onc follow up     William Storm MD  Medical Director [facility] Medicine  Spectra:  [phone]  Pager: [phone]

## 2019-06-23 NOTE — PROGRESS NOTES
Progress Note   Hospital Medicine         Patient Name: Jr Marsh  MRN:  [id number]  [facility] Medicine Team: [facility] L William Storm MD  Date of Admission:  6/20/2019     Length of Stay:  LOS: 0 days   Expected Discharge Date: 6/21/2019  Principal Problem:  Cholangiocarcinoma       Subjective:     Interval History/Overnight Events:  Patient states having RUQ abdominal that is currently a 9/10; states having a good appetite; starting prn oxy and dilaudid prn for breakthrough;   - plan for U/S liver biopsy tomorrow and d/c after with heme/onc follow up here in about a week for results and treatment options     Review of Systems   Constitutional: Negative for chills, fatigue, fever.   HENT: Negative for sore throat, trouble swallowing.    Eyes: Negative for photophobia, visual disturbance.   Respiratory: Negative for cough, shortness of breath.    Cardiovascular: Negative for chest pain, palpitations, leg swelling.   Gastrointestinal: positive for abdominal pain, no constipation, diarrhea, nausea, vomiting.   Endocrine: Negative for cold intolerance, heat intolerance.   Genitourinary: Negative for dysuria, frequency.   Musculoskeletal: Negative for arthralgias, myalgias.   Skin: Negative for rash, wound, erythema   Neurological: Negative for dizziness, syncope, weakness, light-headedness.   Psychiatric/Behavioral: Negative for confusion, hallucinations, anxiety  All other systems reviewed and are negative.    Objective:     Temp:  [97 °F (36.1 °C)-100 °F (37.8 °C)]   Pulse:  [75-81]   Resp:  [18-20]   BP: (116-128)/(69-87)   SpO2:  [95 %-98 %]       Physical Exam:  Constitutional: Appears well-developed and well-nourished.   Head: Normocephalic and atraumatic.   Mouth/Throat: Oropharynx is clear and moist.   Eyes: EOM are normal. Pupils are equal, round, and reactive to light. No scleral icterus.   Neck: Normal range of motion. Neck supple.   Cardiovascular: Normal rate and regular rhythm.  No murmur  heard.  Pulmonary/Chest: Effort normal and breath sounds normal. No respiratory distress. No wheezes, rales, or rhonchi  Abdominal: Soft. Bowel sounds are normal.  No distension, positive tenderness RUQ  Musculoskeletal: Normal range of motion. No edema.   Neurological: Alert and oriented to person, place, and time.   Skin: Skin is warm and dry.   Psychiatric: Normal mood and affect. Behavior is normal.     Recent Labs   Lab 06/20/19  1516 06/21/19  0501 06/22/19  0347 06/23/19  0333   WBC 13.38* 11.36 10.15 11.93   HGB 9.7* 8.4* 8.1* 9.0*   HCT 34.0* 28.4* 28.2* 31.0*   * 552* 524* 578*     Recent Labs   Lab 06/21/19  0501 06/22/19  0347 06/23/19  0333   * 137 136   K 4.3 4.4 4.3    103 102   CO2 23 27 24   BUN 14 14 9   CREATININE 0.8 0.8 0.7   GLU 79 93 85   CALCIUM 8.5* 7.9* 8.3*   MG 2.0 2.0 1.9   PHOS 3.2 2.7 2.9     Recent Labs   Lab 06/20/19  1516 06/21/19  0501 06/22/19  0347 06/23/19  0333   ALKPHOS 509* 467* 474* 520*   * 114* 119* 131*   * 140* 134* 141*   ALBUMIN 2.7* 2.3* 2.2* 2.4*   PROT 6.2 5.4* 5.2* 5.6*   BILITOT 0.9 0.7 0.7 0.7   INR 1.1  --   --   --      Recent Labs   Lab 06/21/19  0544 06/21/19  0804 06/21/19  1103   POCTGLUCOSE 96 81 79        senna-docusate 8.6-50 mg  2 tablet Oral BID       Assessment and Plan     Mr. Jr Marsh is a 53 y.o. male who presented to [facility] on 6/20/2019 with     Hospital Course:    Mr. Jr Marsh was admitted to Hospital Medicine for management of     Active Hospital Problems    Diagnosis  POA    *Cholangiocarcinoma [C22.1]  Yes    Elevated LFTs [R94.5]  Yes    Microcytic anemia [D50.9]  Yes    Leukocytosis [D72.829]  Yes    Mild protein malnutrition [E44.1]  Yes    Liver masses [R16.0]  Yes    Metastatic cancer [C79.9]  Yes      Resolved Hospital Problems   No resolved problems to display.     # Metastatic cancer  # Cholangiocarcinoma   # Elevated LFTs  - patient's CT ab/pelv is concern for metastatic HCC  - all  tumor markers elevated and CA 19-9 markedly elevated   - C-scope last year negative for malignancy  - CT chest shows no metastatic lesions   - hepatology and heme/onc consult  - MRI abdomen suspicious for cholangio  - planning for liver biopsy on Monday      # Leukocytosis   - unclear etiology  - infectious work up; afebrile     # Microcytic anemia  - iron panel and ferritin      # Mild protein gage malnutrition   - PAB and dietary         Diet:  regular   GI PPx:    DVT PPx:    Goals of Care:  full        Disposition:  monday after biopsy with heme/onc follow up     William Storm MD  Medical Director [facility] Medicine  Spectra:  [phone]  Pager: [phone]

## 2019-06-23 NOTE — PLAN OF CARE
Problem: Adult Inpatient Plan of Care  Goal: Plan of Care Review  Outcome: Ongoing (interventions implemented as appropriate)  Plan of care reviewed with patient. No S/S of distress. Vital signs are stable.  Pain managed through medication.

## 2019-06-23 NOTE — PLAN OF CARE
Problem: Adult Inpatient Plan of Care  Goal: Plan of Care Review  Outcome: Ongoing (interventions implemented as appropriate)  Patient remains free from fall with injury

## 2019-06-23 NOTE — TREATMENT PLAN
Hepatology Treatment Plan  06/22/2019  10:06 PM    Chart reviewed and case discussed with attending.  Most likely cholangiocarcinoma will undergo liver lesion biopsy on Monday. Can discuss case at IR Tuesday. We thank you for this consultation, we will sign off at this time, please call with any additional questions.    Nina Davis M.D.  Gastroenterology Fellow, PGY-V  Pager: [phone]  [facility]

## 2019-06-24 NOTE — PROGRESS NOTES
Progress Note   Hospital Medicine         Patient Name: Jr Marsh  MRN:  [id number]  [facility] Medicine Team: [facility] MED L William Storm MD  Date of Admission:  6/20/2019     Length of Stay:  LOS: 0 days   Expected Discharge Date: 6/21/2019  Principal Problem:  Cholangiocarcinoma       Subjective:     Interval History/Overnight Events:    - patient went for IR liver biopsy today, path pending; patient states pain is controlled  - patient's LFTs however have increased so will monitor another day  - patient will be discussed at IR conference tomorrow to discuss the type of options he has for treatment; heme/onc also on board  - patient has applied for medicaid and states he will apply for the affordable care act if that will get him insured faster;     Review of Systems   Constitutional: Negative for chills, fatigue, fever.   HENT: Negative for sore throat, trouble swallowing.    Eyes: Negative for photophobia, visual disturbance.   Respiratory: Negative for cough, shortness of breath.    Cardiovascular: Negative for chest pain, palpitations, leg swelling.   Gastrointestinal: positive for abdominal pain, no constipation, diarrhea, nausea, vomiting.   Endocrine: Negative for cold intolerance, heat intolerance.   Genitourinary: Negative for dysuria, frequency.   Musculoskeletal: Negative for arthralgias, myalgias.   Skin: Negative for rash, wound, erythema   Neurological: Negative for dizziness, syncope, weakness, light-headedness.   Psychiatric/Behavioral: Negative for confusion, hallucinations, anxiety  All other systems reviewed and are negative.    Objective:     Temp:  [97.6 °F (36.4 °C)-99.2 °F (37.3 °C)]   Pulse:  [71-86]   Resp:  [12-20]   BP: (103-131)/(71-84)   SpO2:  [95 %-100 %]       Physical Exam:  Constitutional: Appears well-developed and well-nourished.   Head: Normocephalic and atraumatic.   Mouth/Throat: Oropharynx is clear and moist.   Eyes: EOM are normal. Pupils are equal, round, and reactive  to light. No scleral icterus.   Neck: Normal range of motion. Neck supple.   Cardiovascular: Normal rate and regular rhythm.  No murmur heard.  Pulmonary/Chest: Effort normal and breath sounds normal. No respiratory distress. No wheezes, rales, or rhonchi  Abdominal: Soft. Bowel sounds are normal.  No distension, positive tenderness RUQ  Musculoskeletal: Normal range of motion. No edema.   Neurological: Alert and oriented to person, place, and time.   Skin: Skin is warm and dry.   Psychiatric: Normal mood and affect. Behavior is normal.     Recent Labs   Lab 06/20/19  1516 06/21/19  0501 06/22/19  0347 06/23/19  0333 06/24/19  0402   WBC 13.38* 11.36 10.15 11.93 11.23   HGB 9.7* 8.4* 8.1* 9.0* 9.1*   HCT 34.0* 28.4* 28.2* 31.0* 32.2*   * 552* 524* 578* 576*     Recent Labs   Lab 06/22/19  0347 06/23/19  0333 06/24/19  0402    136 131*   K 4.4 4.3 4.9    102 97   CO2 27 24 25   BUN 14 9 14   CREATININE 0.8 0.7 0.9   GLU 93 85 77   CALCIUM 7.9* 8.3* 8.9   MG 2.0 1.9 1.9   PHOS 2.7 2.9 3.6     Recent Labs   Lab 06/20/19  1516  06/22/19  0347 06/23/19  0333 06/24/19  0402   ALKPHOS 509*   < > 474* 520* 525*   *   < > 119* 131* 181*   *   < > 134* 141* 221*   ALBUMIN 2.7*   < > 2.2* 2.4* 2.5*   PROT 6.2   < > 5.2* 5.6* 6.0   BILITOT 0.9   < > 0.7 0.7 1.1*   INR 1.1  --   --   --   --     < > = values in this interval not displayed.     Recent Labs   Lab 06/21/19  0544 06/21/19  0804 06/21/19  1103   POCTGLUCOSE 96 81 79        senna-docusate 8.6-50 mg  2 tablet Oral BID       Assessment and Plan     Mr. Jr Marsh is a 53 y.o. male who presented to [facility] on 6/20/2019 with     Hospital Course:    Mr. Jr Marsh was admitted to Hospital Medicine for management of     Active Hospital Problems    Diagnosis  POA    *Cholangiocarcinoma [C22.1]  Yes    Elevated LFTs [R94.5]  Yes    Microcytic anemia [D50.9]  Yes    Leukocytosis [D72.829]  Yes    Mild protein malnutrition [E44.1]   Yes    Liver masses [R16.0]  Yes    Metastatic cancer [C79.9]  Yes      Resolved Hospital Problems   No resolved problems to display.     # Metastatic cancer  # Cholangiocarcinoma   # Elevated LFTs  - patient's CT ab/pelv is concern for metastatic HCC  - all tumor markers elevated and CA 19-9 markedly elevated   - C-scope last year negative for malignancy  - CT chest shows no metastatic lesions   - hepatology and heme/onc consult  - MRI abdomen suspicious for cholangio  - IR liver biopsy done on 6/24, path pending; will be discussed at IR conference tomorrow  - LFTs rising today, monitor       # Leukocytosis   - unclear etiology  - infectious work up negative; afebrile     # Microcytic anemia  - iron panel and ferritin      # Mild protein gage malnutrition   - PAB and dietary         Diet:  regular   GI PPx:    DVT PPx:    Goals of Care:  full        Disposition:  possibly tomorrow if LFTs stabilize      William Storm MD  Medical Director [facility] Medicine  Spectra:  [phone]  Pager: [phone]

## 2019-06-24 NOTE — PROGRESS NOTES
Pt Liver Biopsy complete. Incision site CDI. VSS. Pt to Rocu . Bedside report given to Rocu RN. Report called to RN 7a.  Bedrest until 1430. Sandbag to site until 1130. Per MD Diana.

## 2019-06-24 NOTE — NURSING
Patient returned to obs 7. Denies any discomfort at this time. RLQ assessed, dressing c/d/i. No s/s of hematoma. Educated patient on bedrest until 1430. Safety maintained.

## 2019-06-24 NOTE — PLAN OF CARE
Problem: Pain Acute  Goal: Optimal Pain Control  Outcome: Ongoing (interventions implemented as appropriate)  Pain management address during shift. No distress noted. Safety maintained.

## 2019-06-24 NOTE — H&P
Inpatient Radiology Pre-procedure Note    History of Present Illness:  Jr Marsh is a 53 y.o. male with multiple hepatic masses who presents for liver biopsy.  Admission H&P reviewed.  History reviewed. No pertinent past medical history.  Past Surgical History:   Procedure Laterality Date    KNEE SURGERY      SHOULDER SURGERY         Review of Systems:   As documented in primary team H&P    Home Meds:   Prior to Admission medications    Medication Sig Start Date End Date Taking? Authorizing Provider   DULCOLAX, BISACODYL, ORAL Take 1 tablet by mouth daily as needed (constipation).   Yes Historical Provider, MD   ibuprofen (ADVIL ORAL) Take 1 tablet by mouth every 6 (six) hours as needed (pain).   Yes Historical Provider, MD   magnesium oxide-Mg AA chelate (MG-PLUS-PROTEIN) 133 mg Tab Take 1 tablet by mouth once daily.   Yes Historical Provider, MD   polyethylene glycol (GLYCOLAX) 17 gram/dose powder Take 17 g by mouth daily as needed (constipation).   Yes Historical Provider, MD   simethicone (GAS-X ORAL) Take 1 tablet by mouth daily as needed (gas).   Yes Historical Provider, MD     Scheduled Meds:    senna-docusate 8.6-50 mg  2 tablet Oral BID     Continuous Infusions:   PRN Meds:acetaminophen, Dextrose 10% Bolus, Dextrose 10% Bolus, dicyclomine, glucagon (human recombinant), glucose, glucose, HYDROmorphone, ondansetron, oxyCODONE, sodium chloride 0.9%, sodium chloride 0.9%  Anticoagulants/Antiplatelets: no anticoagulation    Allergies: Review of patient's allergies indicates:  No Known Allergies  Sedation Hx: have not been any systemic reactions    Labs:  Recent Labs   Lab 06/20/19  1516   INR 1.1       Recent Labs   Lab 06/24/19  0402   WBC 11.23   HGB 9.1*   HCT 32.2*   MCV 77*   *      Recent Labs   Lab 06/24/19  0402   GLU 77   *   K 4.9   CL 97   CO2 25   BUN 14   CREATININE 0.9   CALCIUM 8.9   MG 1.9   *   *   ALBUMIN 2.5*   BILITOT 1.1*         Vitals:  Temp: 99.2 °F (37.3  °C) (06/24/19 0746)  Pulse: 77 (06/24/19 0955)  Resp: 16 (06/24/19 0955)  BP: 126/80 (06/24/19 0955)  SpO2: 99 % (06/24/19 0955)     Physical Exam:  ASA: III  Mallampati: II    General: no acute distress  Mental Status: alert and oriented to person, place and time  HEENT: normocephalic, atraumatic  Chest: unlabored breathing  Heart: regular heart rate  Abdomen: nondistended  Extremity: moves all extremities    Plan: percutaneous liver biopsy  Sedation Plan: moderate    Elaine Lawrence, PGY2

## 2019-06-24 NOTE — PLAN OF CARE
Problem: Adult Inpatient Plan of Care  Goal: Plan of Care Review  Outcome: Ongoing (interventions implemented as appropriate)  Plan of care reviewed with patient.  Vital signs are stable.  No S/S of distress. Pain managed through medication.

## 2019-06-24 NOTE — TELEPHONE ENCOUNTER
..  Patient: Jr Marsh       MRN: [id number]      : 1965     Age: 53 y.o.  [address] [facility] [address]    Provider: Hepatologist Madisyn Ramirez    Urgency of review: urgent    Patient Transplant Status: Not a candidate    Reason for presentation: Non-Transplant    Clinical Summary: Jr Marsh is a 53 y.o. male with multiple hepatic masses who presents for liver biopsy.    Imaging to be reviewed: MRI abdomen Pelvis    HCC Treatment History: N/A    ABO: N/A  Platelets:   Lab Results   Component Value Date/Time     (H) 2019 04:02 AM     Creatinine:   Lab Results   Component Value Date/Time    CREATININE 0.9 2019 04:02 AM     Bilirubin:   Lab Results   Component Value Date/Time    BILITOT 1.1 (H) 2019 04:02 AM     AFP Last 3 each if available:   Lab Results   Component Value Date/Time     (H) 2019 05:01 AM       MELD: MELD-Na score: 7 at 2019  3:47 AM  MELD score: 7 at 2019  3:47 AM  Calculated from:  Serum Creatinine: 0.8 mg/dL (Rounded to 1 mg/dL) at 2019  3:47 AM  Serum Sodium: 137 mmol/L at 2019  3:47 AM  Total Bilirubin: 0.7 mg/dL (Rounded to 1 mg/dL) at 2019  3:47 AM  INR(ratio): 1.1 at 2019  3:16 PM  Age: 53 years    Plan:     Follow-up Provider:

## 2019-06-24 NOTE — PLAN OF CARE
CHELLE following for D/C needs. SW is in communication with patient's CM and patient's Care Team - IML. CHELLE will continue to follow.      06/24/19 3458   Post-Acute Status   Post-Acute Authorization Other   Other Status No Post-Acute Service Needs     Madalyn Guzman LMSW   - [facility]  Ext. [phone]

## 2019-06-25 PROBLEM — A41.50 SEPSIS DUE TO GRAM NEGATIVE BACTERIA: Status: ACTIVE | Noted: 2019-01-01

## 2019-06-25 PROBLEM — K59.01 SLOW TRANSIT CONSTIPATION: Status: ACTIVE | Noted: 2019-01-01

## 2019-06-25 PROBLEM — K75.9 HEPATITIS: Status: ACTIVE | Noted: 2019-01-01

## 2019-06-25 PROBLEM — D50.9 IRON DEFICIENCY ANEMIA: Status: ACTIVE | Noted: 2019-01-01

## 2019-06-25 NOTE — PLAN OF CARE
Problem: Adult Inpatient Plan of Care  Goal: Plan of Care Review  Outcome: Ongoing (interventions implemented as appropriate)  POC reviewed with patient, who verbalized understanding. AAOx4.   Remains free of falls and injury.   VSS. Mild temp 100.7F relieved with Tylenol.    BiOp site to right chest, covered with band-aid.   Tolerating diet. No  Nausea. Pain located in abdomen, slightly distended, taunt. .   Voiding per commode. 0 X BM.   Up ad damian.   No acute events. No distress noted. Awaiting bed for inpatient. Call bell in reach.   Will continue to monitor.

## 2019-06-25 NOTE — PLAN OF CARE
Problem: Pain Acute  Goal: Optimal Pain Control  Outcome: Ongoing (interventions implemented as appropriate)  Pain management reviewed with patient, verbalized understanding of info. Provided. Oxycodone IR 10 mg q 4 hr. Provided to patient throughout shift for pain management. Last dose provided 1825  With pain to abdomen 8/10. No distress noted. Safety maintained.

## 2019-06-25 NOTE — PLAN OF CARE
06/25/19 1413   Discharge Reassessment   Assessment Type Discharge Planning Reassessment   Discharge Plan A Home   DME Needed Upon Discharge  none   Anticipated Discharge Disposition Home     Patient not medically stable for discharge at this time.  CM to continue to follow for ongoing discharge needs.

## 2019-06-26 NOTE — PLAN OF CARE
Problem: Adult Inpatient Plan of Care  Goal: Plan of Care Review  Outcome: Ongoing (interventions implemented as appropriate)  Pt progressing slowly towards goals.continues with frequent pain to ruq of abdomen requiring oxycodone q4hrs and dilaudid ivp for btp.bandaide intact to ruq of abd.safety precautions maintained.continue antibiotics.afebrile tonight.continue plan of care.

## 2019-06-27 PROBLEM — R10.84 GENERALIZED ABDOMINAL PAIN: Status: ACTIVE | Noted: 2019-01-01

## 2019-06-27 NOTE — PLAN OF CARE
Problem: Adult Inpatient Plan of Care  Goal: Plan of Care Review  Outcome: Ongoing (interventions implemented as appropriate)  Plan of care reviewed with pt. Pt verbalized understanding. Prn pain medication given as needed. Vss. Safety measures ongoing. Side rails up x 2. Call bell within reach. Bed in lowest position. NADN; will report off to oncoming shift

## 2019-06-27 NOTE — NURSING
Discharge instruction given; pt verbalized understanding to instruction; removed PIV; brother at bedside with wheelchair.

## 2019-06-27 NOTE — TREATMENT PLAN
Hepatology Treatment Plan  06/27/2019  1:38 PM    Liver biopsy consistent with metastatic disease and would therefore recommend follow up with Heme-Onc based on their recommendations might need repeat scopes. In regards to his rising LFT's would simply monitor, unsure if due to the malignancy itself or post procedure. We will sign off at this time please call with any additional questions.    Nina Davis M.D.  Gastroenterology Fellow, PGY-V  Pager: [phone]  [facility]

## 2019-06-27 NOTE — PROGRESS NOTES
Progress Note   Hospital Medicine         Patient Name: Jr Marsh  MRN:  [id number]  [facility] [place] [facility] [place] Wanda Escoto MD  Date of Admission:  6/20/2019     Length of Stay:  LOS: 2 days   Expected Discharge Date: 6/28/2019  Principal Problem:  Sepsis due to Gram negative bacteria       Subjective:     Interval History/Overnight Events:    S/p liver bx, path below    LFTs now stable from yesterday    Pain well control. Bowel regimen for constipation   No further fevers. cx negative. Transitioned to PO augmentin    patient has applied for medicaid and states he will apply for the affordable care act if that will get him insured faster;      [START ON 6/27/2019] amoxicillin-clavulanate 875-125mg  1 tablet Oral Q12H    ferrous sulfate  325 mg Oral BID    polyethylene glycol  17 g Oral Daily    senna-docusate 8.6-50 mg  2 tablet Oral BID       Review of Systems   Constitutional: Negative for chills, fatigue, fever.   HENT: Negative for sore throat, trouble swallowing.    Eyes: Negative for photophobia, visual disturbance.   Respiratory: Negative for cough, shortness of breath.    Cardiovascular: Negative for chest pain, palpitations, leg swelling.   Gastrointestinal: positive for abdominal pain, no constipation, diarrhea, nausea, vomiting.   Endocrine: Negative for cold intolerance, heat intolerance.   Genitourinary: Negative for dysuria, frequency.   Musculoskeletal: Negative for arthralgias, myalgias.   Skin: Negative for rash, wound, erythema   Neurological: Negative for dizziness, syncope, weakness, light-headedness.   Psychiatric/Behavioral: Negative for confusion, hallucinations, anxiety  All other systems reviewed and are negative.    Objective:     Temp:  [97.2 °F (36.2 °C)-99.3 °F (37.4 °C)]   Pulse:  []   Resp:  [18]   BP: ()/(56-74)   SpO2:  [94 %-98 %]       Physical Exam:  Constitutional: Appears well-developed and well-nourished.   Head: Normocephalic and atraumatic.    Mouth/Throat: Oropharynx is clear and moist.   Eyes: EOM are normal. Pupils are equal, round, and reactive to light. No scleral icterus.   Neck: Normal range of motion. Neck supple.   Cardiovascular: Normal rate and regular rhythm.  No murmur heard.  Pulmonary/Chest: Effort normal and breath sounds normal. No respiratory distress. No wheezes, rales, or rhonchi  Abdominal: Soft. Bowel sounds are normal.  No distension, positive tenderness RUQ  Musculoskeletal: Normal range of motion. No edema.   Neurological: Alert and oriented to person, place, and time.   Skin: Skin is warm and dry.   Psychiatric: Normal mood and affect. Behavior is normal.     Recent Labs   Lab 06/21/19  0501 06/22/19  0347 06/23/19  0333 06/24/19  0402 06/25/19  0413 06/26/19  0412   WBC 11.36 10.15 11.93 11.23 14.54* 16.27*   HGB 8.4* 8.1* 9.0* 9.1* 8.9* 8.4*   HCT 28.4* 28.2* 31.0* 32.2* 30.7* 28.3*   * 524* 578* 576* 517* 444*     Recent Labs   Lab 06/23/19  0333 06/24/19  0402 06/25/19  0413 06/26/19  0412    131* 128* 124*   K 4.3 4.9 4.6 4.8    97 94* 90*   CO2 24 25 25 25   BUN 9 14 13 14   CREATININE 0.7 0.9 0.9 0.9   GLU 85 77 90 89   CALCIUM 8.3* 8.9 8.6* 8.3*   MG 1.9 1.9 1.8  --    PHOS 2.9 3.6 2.8  --      Recent Labs   Lab 06/20/19  1516  06/24/19  0402 06/25/19  0413 06/26/19  0412   ALKPHOS 509*   < > 525* 550* 449*   *   < > 181* 370* 432*   *   < > 221* 574* 573*   ALBUMIN 2.7*   < > 2.5* 2.3* 2.1*   PROT 6.2   < > 6.0 5.9* 5.5*   BILITOT 0.9   < > 1.1* 1.0 0.9   INR 1.1  --   --   --   --     < > = values in this interval not displayed.       Liver biopsy    Adenocarcinoma is present. The morphology is typical for that seen in adenocarcinoma of colonic origin. Negativity  for cytokeratin 20 with positivity for cytokeratin 7 is against a diagnosis of colonic adenocarcinoma, however. The  tumor is TTF negative which is against a diagnosis of adenocarcinoma of lung origin . CDX2 positivity is in  favor of  gastrointestinal tract origin. The morphology here conceivably might be seen in a small intestinal adenocarcinoma.  The morphology does not point towards stomach, pancreas, or biliary tract. Adenocarcinoma of the very distal colon  could be cytokeratin 20 negative and cytokeratin 7 positive. The positive and negative controls stained appropriately.    Assessment and Plan       Active Hospital Problems    Diagnosis  POA    *Sepsis due to Gram negative bacteria [A41.50]  No    Iron deficiency anemia [D50.9]  Yes    Hepatitis [K75.9]  Yes    Slow transit constipation [K59.01]  Yes    Cholangiocarcinoma [C22.1]  Yes    Elevated LFTs [R94.5]  Yes    Microcytic anemia [D50.9]  Yes    Leukocytosis [D72.829]  Yes    Mild protein malnutrition [E44.1]  Yes    Liver masses [R16.0]  Yes    Metastatic cancer [C79.9]  Yes      Resolved Hospital Problems   No resolved problems to display.     # Metastatic cancer  # Cholangiocarcinoma   # Elevated LFTs  - patient's CT ab/pelv is concern for metastatic HCC  - all tumor markers elevated and CA 19-9 markedly elevated   - C-scope last year negative for malignancy  - CT chest shows no metastatic lesions   - hepatology and heme/onc consulted  - MRI abdomen suspicious for cholangio  - IR liver biopsy done on 6/24, path showing adenocarcinoma of colonic origin  - LFTs rising       # Leukocytosis   # sepsis   - unclear etiology  - infectious work up negative; afebrile  - cont abx as above  - D dimer  And LDH elevated   - fever may be 2/2 malignancy vs PE/ dvt  - LE u/s with no DVT     # Microcytic anemia  - iron panel and ferritin reviwed  - iron def anemia  - iron supplementation started      # Mild protein gage malnutrition   - PAB and dietary         Diet:  regular   GI PPx:    DVT PPx:    Goals of Care:  full        Disposition:  6/27  Has oncology clinic follow up on 7/1/19. Pending medicaid application      Signing Physician:     Wanda Escoto  MD  Department of [facility]   [facility]  Pager [phone]  [facility] [id number]  6/26/2019

## 2019-06-27 NOTE — DISCHARGE SUMMARY
"DISCHARGE SUMMARY  Hospital Medicine    [place] [facility]    Patient Name: Jr Marsh  YOB: 1965    Admit Date: 6/20/2019    Discharge Date: 6/27/2019    Discharge Attending Physician: Wanda Escoto MD    Chief Complaint: abnormal liver enzymes     Princilpal Diagnoses:  Active Hospital Problems    Diagnosis  POA    *Sepsis due to Gram negative bacteria [A41.50]  No    Generalized abdominal pain [R10.84]  Yes    Iron deficiency anemia [D50.9]  Yes    Hepatitis [K75.9]  Yes    Slow transit constipation [K59.01]  Yes    Metastatic adenocarcinoma [C79.9]  Yes    Elevated LFTs [R94.5]  Yes    Microcytic anemia [D50.9]  Yes    Leukocytosis [D72.829]  Yes    Mild protein malnutrition [E44.1]  Yes    Liver masses [R16.0]  Yes    Metastatic cancer [C79.9]  Yes      Resolved Hospital Problems   No resolved problems to display.       Discharged Condition: Admit problems have stabilized    HOSPITAL COURSE:      Initial Presentation:    As per admitting provider,     53 y.o. male with co-morbidities including: history of knee and shoulder injury, who presents to the ED with a complaint of abdominal pain with associated symptoms of fever, chills, decreased appetite, decreased bowel movements, and abdominal distention that started approximately two weeks ago. Patient states that he has only been consuming one meal a today, due to his fear that he will "burst" if he consumes more. Patient endorses usually having a bowel movements 1-3 times a day but is only have one bowels movement a day. Patient states that the pain is exasperated by deep breaths. Patient has tried Gas-X tablets with no relief. Patient denies nausea, vomiting, weight loss, history of heavy alcohol use, and history of hepatitis B and C.      Patient states he had a C-scope last year and it was normal.      Course of Principle Problem for Admission:    Metastatic adenocarcinoma  Elevated LFTs  - on admit, patient's CT ab/pelv is " concern for metastatic HCC vs cholangiocarcinoma . Tumor markers- AFP of 974, CA 19-9 > 48383, CEA of 158.3  - C-scope last year negative for malignancy, as per patient's report   - CT chest shows no metastatic lesions   - hepatology and heme/onc consulted on the case  - MRI abdomen suspicious for cholangio  - IR liver biopsy done on 6/24, path showing adenocarcinoma of colonic origin  - LFTs elda to    post biopsy and stabilized  - Has a f/u appt with [facility] oncology on 7/1 for treatment discussion. Pending medicaid     On the day of d/c, patient was doing well with no acute issues.   Pathology results discussed with patient. Discharged on narcotics for pain and bowel regimen. Has a f/u appt with [facility] oncology on 7/1 for treatment discussion. Pending medicaid     Physical Exam:  Constitutional: Appears well-developed and well-nourished.   Head: Normocephalic and atraumatic.   Mouth/Throat: Oropharynx is clear and moist.   Eyes: EOM are normal. Pupils are equal, round, and reactive to light. No scleral icterus.   Neck: Normal range of motion. Neck supple.   Cardiovascular: Normal rate and regular rhythm.  No murmur heard.  Pulmonary/Chest: Effort normal and breath sounds normal. No respiratory distress. No wheezes, rales, or rhonchi  Abdominal: Soft. Bowel sounds are normal.  No distension, positive tenderness RUQ  Musculoskeletal: Normal range of motion. No edema.   Neurological: Alert and oriented to person, place, and time.   Skin: Skin is warm and dry.   Psychiatric: Normal mood and affect. Behavior is normal.     Other Medical Problems Addressed in the Hospital:    # Leukocytosis   # sepsis due to gram negative bacteria   - fever of 101 F. unclear etiology infectious work up negative  - D dimer  And LDH elevated   - fever may be 2/2 malignancy vs PE/ dvt  - LE u/s with no DVT  - treated empirically for intra-abd source with 7 day abx course, discharged with augmentin      # Microcytic anemia  - iron  panel and ferritin reviwed  - iron def anemia  - iron supplementation started . Though led to further constipation      # Mild protein gage malnutrition   - PAB and dietary     CONSULTS: hepatology, oncology     PROCEDURES- liver biopsy     Labs:    Chemistries:   Recent Labs   Lab 06/23/19  0333 06/24/19  0402 06/25/19  0413 06/26/19  0412 06/27/19  0422    131* 128* 124* 125*   K 4.3 4.9 4.6 4.8 5.1    97 94* 90* 90*   CO2 24 25 25 25 27   BUN 9 14 13 14 20   CREATININE 0.7 0.9 0.9 0.9 0.9   CALCIUM 8.3* 8.9 8.6* 8.3* 8.4*   PROT 5.6* 6.0 5.9* 5.5* 5.8*   BILITOT 0.7 1.1* 1.0 0.9 1.4*   ALKPHOS 520* 525* 550* 449* 480*   * 181* 370* 432* 506*   * 221* 574* 573* 592*   MG 1.9 1.9 1.8  --   --    PHOS 2.9 3.6 2.8  --   --         WBC:   Recent Labs   Lab 06/23/19  0333 06/24/19  0402 06/25/19  0413 06/26/19  0412 06/27/19  0422   WBC 11.93 11.23 14.54* 16.27* 14.95*     Bands:     CBC/Anemia Labs: Coags:    Recent Labs   Lab 06/25/19  0413 06/26/19  0412 06/27/19  0422   WBC 14.54* 16.27* 14.95*   HGB 8.9* 8.4* 8.8*   HCT 30.7* 28.3* 29.2*   * 444* 438*   MCV 75* 73* 71*   RDW 15.5* 15.7* 15.7*    Recent Labs   Lab 06/27/19  0422   INR 1.3*        Diagnostic Results:  Liver biopsy     Adenocarcinoma is present. The morphology is typical for that seen in adenocarcinoma of colonic origin. Negativity  for cytokeratin 20 with positivity for cytokeratin 7 is against a diagnosis of colonic adenocarcinoma, however. The  tumor is TTF negative which is against a diagnosis of adenocarcinoma of lung origin . CDX2 positivity is in favor of  gastrointestinal tract origin. The morphology here conceivably might be seen in a small intestinal adenocarcinoma.  The morphology does not point towards stomach, pancreas, or biliary tract. Adenocarcinoma of the very distal colon  could be cytokeratin 20 negative and cytokeratin 7 positive. The positive and negative controls stained  appropriately.      Disposition:  Home        Future Scheduled Appointments:  Future Appointments   Date Time Provider Department Center   7/1/2019  3:30 PM LAB, HEMONC CANCER BLD[facility] LAB HO Abebe Palafox   7/1/2019  4:30 PM Henry Thompson MD [facility] HEM ONC Abebe Piperkelly       Follow-up Plans from This Hospitalization:  Oncology     Discharge Medication List:       Jr Marsh   Home Medication Instructions JOHN:[id number]    Printed on:06/27/19 1257   Medication Information                      amoxicillin-clavulanate 875-125mg (AUGMENTIN) 875-125 mg per tablet  Take 1 tablet by mouth every 12 (twelve) hours.             bisacodyl (DULCOLAX) 10 mg Supp  Place 1 suppository (10 mg total) rectally daily as needed (constipation).             DULCOLAX, BISACODYL, ORAL  Take 1 tablet by mouth daily as needed (constipation).             magnesium hydroxide 400 mg/5 ml (MILK OF MAGNESIA) 400 mg/5 mL Susp  Take 30 mLs (2,400 mg total) by mouth once daily.             ondansetron (ZOFRAN-ODT) 8 MG TbDL  Take 1 tablet (8 mg total) by mouth every 12 (twelve) hours as needed (nausea).             oxyCODONE (ROXICODONE) 10 mg Tab immediate release tablet  Take 1 tablet (10 mg total) by mouth every 8 (eight) hours as needed (as needed for severe pain).             polyethylene glycol (GLYCOLAX) 17 gram/dose powder  Take 17 g by mouth daily as needed (constipation).             senna-docusate 8.6-50 mg (PERICOLACE) 8.6-50 mg per tablet  Take 2 tablets by mouth 2 (two) times daily.                   At the time of discharge patient was told to take all medications as prescribed, to keep all followup appointments, and to call their primary care physician or return to the emergency room if they have any worsening or concerning symptoms.    Time spent on the discharge of the patient including review of hospital course with the patient. reviewing discharge medications and arranging follow-up care 45 minutes.  Patient was seen and  examined on the date of discharge and determined to be suitable for discharge.        Signing Physician:  Wanda Escoto MD

## 2019-06-28 PROBLEM — C79.9 METASTATIC ADENOCARCINOMA: Status: ACTIVE | Noted: 2019-01-01

## 2019-06-28 NOTE — PHYSICIAN QUERY
PT Name: Jr Marsh  MR #: [id number]     Physician Query Form - Documentation Clarification      CDS: Valery Veras RN    Contact information:[email or web address]    This form is a permanent document in the medical record.     Query Date: June 28, 2019    By submitting this query, we are merely seeking further clarification of documentation. Please utilize your independent clinical judgment when addressing the question(s) below.    The Medical record reflects the following:    Supporting Clinical Findings Location in Medical Record   # Leukocytosis   # sepsis   - unclear etiology  - infectious work up negative; afebrile  - cont abx as above  - D dimer  And LDH elevated   - fever may be 2/2 malignancy vs PE/ dvt  - LE u/s with no DVT   DAVID Escoto MD;  PN 6/26    Active Problem List: Sepsis due to Gram negative bacteria   DAVID Escoto MD;  PN 6/25     Blood Cultures [facility] Micro 6/22 and 6/25                                                                            Doctor, Please clarify the conflicting documentation regarding Sepsis.    Provider Use Only      [  ] Sepsis, unclear etiology, infectious workup negative      [ X ] Sepsis due to Gram negative bacteria      [  ] Other:____________________                                                                                                               [  ] Clinically Undetermined

## 2019-07-01 NOTE — Clinical Note
Hi Ms. Hugoick,Trying to see if I can get FOLFIRINOX approved for this pt to be started on it asap. He is a pt who is going downhill fast and we're trying to get chemo tx going...

## 2019-07-01 NOTE — PROGRESS NOTES
PATIENT: Jr Marsh  MRN: [id number]  DATE: 7/1/2019      Diagnosis: No diagnosis found.    Chief Complaint: No chief complaint on file.      Oncologic History:     History of Present Illness:  Patient is a 53 y.o. [place]ian male presents without any PMHx who presents to the hospital due to abdominal pain with fevers, chills, decreased  appetite, constipation, abdominal distention/pain which started two weeks ago. The pt has been consuming about one meal a day due to severe pain if he continues. He had been having less BM, with about one a day now from three a day previously.The patient has had Gas-X which he he tried over the last week; this weekend he tried dulcolax and miralax and and subsequent loose stools without significant relief. Denies nausea, vomiting; endorses that abdominal pain does worsen with deep breaths. At home, he had taken Advil every 6 - 8 hours for pain relief. Pt has not had any weight loss. Denies any tobacco use. He had been drinking in his 20s-30s, about a 6 pack of beer on weekends; pt denies any significant drinking since 2003. He does not have any personal or family hx of cancer. The pt lives in [facility], works as video .       He had a colonoscopy in [place], [place], which was apparently negative at the time. Patient has friends here for good support but states that all of his family is back in [place].        Subjective:       Past Medical History:   Past Medical History:   Diagnosis Date    Cancer        Past Surgical HIstory:   Past Surgical History:   Procedure Laterality Date    KNEE SURGERY      SHOULDER SURGERY         Family History: No family history on file.    Social History:  reports that he has quit smoking. He has never used smokeless tobacco. He reports that he drank alcohol. He reports that he does not use drugs.    Allergies:  Review of patient's allergies indicates:  No Known Allergies    Medications:  Current Outpatient Medications   Medication Sig  "Dispense Refill    magnesium hydroxide 400 mg/5 ml (MILK OF MAGNESIA) 400 mg/5 mL Susp Take 30 mLs (2,400 mg total) by mouth once daily.      ondansetron (ZOFRAN-ODT) 8 MG TbDL Take 1 tablet (8 mg total) by mouth every 12 (twelve) hours as needed (nausea). 30 tablet 0    oxyCODONE (ROXICODONE) 10 mg Tab immediate release tablet Take 1 tablet (10 mg total) by mouth every 8 (eight) hours as needed (as needed for severe pain). 30 tablet 0    polyethylene glycol (GLYCOLAX) 17 gram/dose powder Take 17 g by mouth daily as needed (constipation).      bisacodyl (DULCOLAX) 10 mg Supp Place 1 suppository (10 mg total) rectally daily as needed (constipation).  0    DULCOLAX, BISACODYL, ORAL Take 1 tablet by mouth daily as needed (constipation).      senna-docusate 8.6-50 mg (PERICOLACE) 8.6-50 mg per tablet Take 2 tablets by mouth 2 (two) times daily.       No current facility-administered medications for this visit.        Review of Systems   Constitutional: Positive for appetite change. Negative for unexpected weight change.   Eyes: Negative for visual disturbance.   Respiratory: Positive for shortness of breath. Negative for cough.    Cardiovascular: Negative for chest pain.   Gastrointestinal: Positive for abdominal pain. Negative for diarrhea.   Genitourinary: Negative for frequency.   Musculoskeletal: Positive for back pain.   Skin: Negative for rash.   Neurological: Negative for headaches.   Hematological: Negative for adenopathy.   Psychiatric/Behavioral: The patient is not nervous/anxious.        ECOG Performance Status: 1   Objective:      Vitals:   Vitals:    07/01/19 1647   BP: 110/68   BP Location: Right arm   Patient Position: Sitting   BP Method: Medium (Automatic)   Pulse: 87   Resp: 18   Temp: 97.5 °F (36.4 °C)   TempSrc: Oral   Weight: 81.1 kg (178 lb 12.7 oz)   Height: 5' 9" (1.753 m)     BMI: Body mass index is 26.4 kg/m².    Physical Exam    Laboratory Data:  Admission on 07/01/2019, Discharged on " 07/01/2019   Component Date Value Ref Range Status    WBC 07/01/2019 20.09* 3.90 - 12.70 K/uL Final    RBC 07/01/2019 4.18* 4.60 - 6.20 M/uL Final    Hemoglobin 07/01/2019 8.8* 14.0 - 18.0 g/dL Final    Hematocrit 07/01/2019 30.6* 40.0 - 54.0 % Final    Mean Corpuscular Volume 07/01/2019 73* 82 - 98 fL Final    Mean Corpuscular Hemoglobin 07/01/2019 21.1* 27.0 - 31.0 pg Final    Mean Corpuscular Hemoglobin Conc 07/01/2019 28.8* 32.0 - 36.0 g/dL Final    RDW 07/01/2019 16.9* 11.5 - 14.5 % Final    Platelets 07/01/2019 420* 150 - 350 K/uL Corrected    Platelets are clumped on smear.Platelet count may be affected.    MPV 07/01/2019 10.0  9.2 - 12.9 fL Final    Immature Granulocytes 07/01/2019 2.4* 0.0 - 0.5 % Final    Gran # (ANC) 07/01/2019 16.0* 1.8 - 7.7 K/uL Final    Immature Grans (Abs) 07/01/2019 0.48* 0.00 - 0.04 K/uL Final    Comment: Mild elevation in immature granulocytes is non specific and   can be seen in a variety of conditions including stress response,   acute inflammation, trauma and pregnancy. Correlation with other   laboratory and clinical findings is essential.      Lymph # 07/01/2019 1.2  1.0 - 4.8 K/uL Final    Mono # 07/01/2019 2.4* 0.3 - 1.0 K/uL Final    Eos # 07/01/2019 0.0  0.0 - 0.5 K/uL Final    Baso # 07/01/2019 0.05  0.00 - 0.20 K/uL Final    nRBC 07/01/2019 4* 0 /100 WBC Final    Gran% 07/01/2019 79.7* 38.0 - 73.0 % Final    Lymph% 07/01/2019 5.7* 18.0 - 48.0 % Final    Mono% 07/01/2019 11.9  4.0 - 15.0 % Final    Eosinophil% 07/01/2019 0.1  0.0 - 8.0 % Final    Basophil% 07/01/2019 0.2  0.0 - 1.9 % Final    Platelet Estimate 07/01/2019 Increased*  Final    Aniso 07/01/2019 Moderate   Final    Poik 07/01/2019 Slight   Final    Poly 07/01/2019 Occasional   Final    Spherocytes 07/01/2019 Occasional   Final    Schistocytes 07/01/2019 Present   Final    Differential Method 07/01/2019 Automated   Final    Sodium 07/01/2019 133* 136 - 145 mmol/L Final     Potassium 07/01/2019 5.9* 3.5 - 5.1 mmol/L Final    *No Visible Hemolysis    Chloride 07/01/2019 98  95 - 110 mmol/L Final    CO2 07/01/2019 25  23 - 29 mmol/L Final    Glucose 07/01/2019 90  70 - 110 mg/dL Final    BUN, Bld 07/01/2019 25* 6 - 20 mg/dL Final    Creatinine 07/01/2019 1.1  0.5 - 1.4 mg/dL Final    Calcium 07/01/2019 8.3* 8.7 - 10.5 mg/dL Final    Total Protein 07/01/2019 5.3* 6.0 - 8.4 g/dL Final    Albumin 07/01/2019 2.2* 3.5 - 5.2 g/dL Final    Total Bilirubin 07/01/2019 3.1* 0.1 - 1.0 mg/dL Final    Comment: For infants and newborns, interpretation of results should be based  on gestational age, weight and in agreement with clinical  observations.  Premature Infant recommended reference ranges:  Up to 24 hours.............<8.0 mg/dL  Up to 48 hours............<12.0 mg/dL  3-5 days..................<15.0 mg/dL  6-29 days.................<15.0 mg/dL      Alkaline Phosphatase 07/01/2019 597* 55 - 135 U/L Final    AST 07/01/2019 743* 10 - 40 U/L Final    ALT 07/01/2019 488* 10 - 44 U/L Final    Anion Gap 07/01/2019 10  8 - 16 mmol/L Final    eGFR if African American 07/01/2019 >60.0  >60 mL/min/1.73 m^2 Final    eGFR if non African American 07/01/2019 >60.0  >60 mL/min/1.73 m^2 Final    Comment: Calculation used to obtain the estimated glomerular filtration  rate (eGFR) is the CKD-EPI equation.       Troponin I 07/01/2019 <0.006  0.000 - 0.026 ng/mL Final    Comment: The reference interval for Troponin I represents the 99th percentile   cutoff   for our facility and is consistent with 3rd generation assay   performance.      Troponin I 07/01/2019 <0.006  0.000 - 0.026 ng/mL Final    Comment: The reference interval for Troponin I represents the 99th percentile   cutoff   for our facility and is consistent with 3rd generation assay   performance.      BNP 07/01/2019 75  0 - 99 pg/mL Final    Values of less than 100 pg/ml are consistent with non-CHF populations.    Phosphorus 07/01/2019  2.2* 2.7 - 4.5 mg/dL Final    Magnesium 07/01/2019 2.5  1.6 - 2.6 mg/dL Final   Admission on 06/20/2019, Discharged on 06/27/2019   No results displayed because visit has over 200 results.            Imaging:  Assessment:       No diagnosis found.       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, unknown origin  - ECOG PS 0  - distal esophageal thickening, 12 mm; unclear if this is esophagitis  - numerous ill-defined hypodense, confluent masses in liver consistent with metastases versus disseminated HCC  - 1.5 cm and a 2.1 cm enlarged low-density lymph node at the mariam hepatis, possible necrotic adenopahty  - small free fluid in inferior pelvis without any inguinal/ pelvic adenopathy  - no obvious pancreatic or colonic lesion noted on scan  - review of tumor markers reveals multiple possible origins: AFP of 974, CA 19-9 > 29596, CEA of 158.3  - noted plans for MRI abdomen  - would plan to biopsy one of liver lesions, so that STRATA or additional molecular testing could be completed for any possible targeted therapy  - Please discuss with IR about completion of biopsy as outpatient ASAP and we will help follow patient in Oncology clinic  - will also plan for PET-CT as outpatient    Neoplasm related pain  - would recommend oral pain meds PRN to assist with pain control.     Please call with any additional questions, discussed with Dr. Yazmin Thompson MD  Hematology and Oncology Fellow, PGY IV  [facility]    Distress Screening Results: Psychosocial Distress screening score of Distress Score: 7 noted and reviewed. No intervention indicated.

## 2019-07-01 NOTE — PROGRESS NOTES
PATIENT: Jr Marsh  MRN: [id number]  DATE: 7/2/2019      Diagnosis:   1. HCC (hepatocellular carcinoma)    2. Adenocarcinoma of colon    3. Liver metastases    4. Neoplasm related pain    5. Hyperbilirubinemia    6. Hyperkalemia    7. Hypoalbuminemia        Chief Complaint: Hospital Follow-up    Oncologic History:   Patient is a 53 y.o. [place] male presents without any PMHx who presents to the hospital due to abdominal pain with fevers, chills, decreased  appetite, constipation, abdominal distention/pain which started two weeks ago. The pt has been consuming about one meal a day due to severe pain if he continues. He had been having less BM, with about one a day now from three a day previously.The patient has had Gas-X which he he tried over the last week; this weekend he tried dulcolax and miralax and and subsequent loose stools without significant relief. Denies nausea, vomiting; endorses that abdominal pain does worsen with deep breaths. At home, he had taken Advil every 6 - 8 hours for pain relief. Pt has not had any weight loss. Denies any tobacco use. He had been drinking in his 20s-30s, about a 6 pack of beer on weekends; pt denies any significant drinking since 2003. He does not have any personal or family hx of cancer. The pt lives in [facility], works as video .       He had a colonoscopy in [place], [place], which was apparently negative at the time. Patient has friends here for good support but states that all of his family is back in [place].     Pt went to ER on 7/1 with R sided chest pain. CTA completed was negative for a PE. U/S was completed to assess hyperbilirubinemia to 3.1, which revealed a 3 mm CBD. Overall, pt is very edematous in bilateral legs. He is also using oxycodone 10 mg every 4 hours to get pain controlled.       Past Medical History:   Past Medical History:   Diagnosis Date    Cancer        Past Surgical HIstory:   Past Surgical History:   Procedure Laterality Date     KNEE SURGERY      SHOULDER SURGERY         Family History: No family history on file.    Social History:  reports that he has quit smoking. He has never used smokeless tobacco. He reports that he drank alcohol. He reports that he does not use drugs.    Allergies:  Review of patient's allergies indicates:  No Known Allergies    Medications:  Current Outpatient Medications   Medication Sig Dispense Refill    magnesium hydroxide 400 mg/5 ml (MILK OF MAGNESIA) 400 mg/5 mL Susp Take 30 mLs (2,400 mg total) by mouth once daily.      ondansetron (ZOFRAN-ODT) 8 MG TbDL Take 1 tablet (8 mg total) by mouth every 12 (twelve) hours as needed (nausea). 30 tablet 0    oxyCODONE (ROXICODONE) 10 mg Tab immediate release tablet Take 1 tablet (10 mg total) by mouth every 8 (eight) hours as needed (as needed for severe pain). 30 tablet 0    polyethylene glycol (GLYCOLAX) 17 gram/dose powder Take 17 g by mouth daily as needed (constipation).      bisacodyl (DULCOLAX) 10 mg Supp Place 1 suppository (10 mg total) rectally daily as needed (constipation).  0    DULCOLAX, BISACODYL, ORAL Take 1 tablet by mouth daily as needed (constipation).      furosemide (LASIX) 40 MG tablet Take 1 tablet (40 mg total) by mouth once daily. 60 tablet 1    lidocaine-prilocaine (EMLA) cream Apply topically as needed. Please place cream over port site one hour prior to labs 25 g 0    morphine (MS CONTIN) 30 MG 12 hr tablet Take 1 tablet (30 mg total) by mouth 2 (two) times daily. 60 tablet 0    senna-docusate 8.6-50 mg (PERICOLACE) 8.6-50 mg per tablet Take 2 tablets by mouth 2 (two) times daily.      ursodiol (ACTIGALL) 300 mg capsule Take 1 capsule (300 mg total) by mouth 2 (two) times daily. 60 capsule 2     No current facility-administered medications for this visit.        Review of Systems   Constitutional: Positive for appetite change. Negative for unexpected weight change.   Eyes: Negative for visual disturbance.   Respiratory:  "Positive for shortness of breath. Negative for cough.    Cardiovascular: Negative for chest pain.   Gastrointestinal: Positive for abdominal pain. Negative for diarrhea.   Genitourinary: Negative for frequency.   Musculoskeletal: Positive for back pain.   Skin: Negative for rash.   Neurological: Negative for headaches.   Hematological: Negative for adenopathy.   Psychiatric/Behavioral: The patient is not nervous/anxious.        ECOG Performance Status: 2  Objective:      Vitals:   Vitals:    07/01/19 1647   BP: 110/68   BP Location: Right arm   Patient Position: Sitting   BP Method: Medium (Automatic)   Pulse: 87   Resp: 18   Temp: 97.5 °F (36.4 °C)   TempSrc: Oral   Weight: 81.1 kg (178 lb 12.7 oz)   Height: 5' 9" (1.753 m)     BMI: Body mass index is 26.4 kg/m².    Physical Exam   Constitutional: He is oriented to person, place, and time. He appears well-developed and well-nourished. No distress.   HENT:   Head: Normocephalic and atraumatic.   Eyes: Pupils are equal, round, and reactive to light. Conjunctivae and EOM are normal. Scleral icterus is present.   Neck: Normal range of motion. Neck supple.   Cardiovascular: Normal rate, regular rhythm and normal heart sounds. Exam reveals no gallop and no friction rub.   No murmur heard.  Pulmonary/Chest: Effort normal and breath sounds normal. No respiratory distress. He has no wheezes. He has no rales.   Abdominal: He exhibits distension. There is tenderness. There is no guarding.   Firm, distended   Musculoskeletal:   3+ pitting edema bilaterally   Lymphadenopathy:     He has no cervical adenopathy.   Neurological: He is alert and oriented to person, place, and time.   Intermittent somnolence   Skin: Skin is warm and dry. No rash noted.       Laboratory Data:  Admission on 07/01/2019, Discharged on 07/01/2019   Component Date Value Ref Range Status    WBC 07/01/2019 20.09* 3.90 - 12.70 K/uL Final    RBC 07/01/2019 4.18* 4.60 - 6.20 M/uL Final    Hemoglobin " 07/01/2019 8.8* 14.0 - 18.0 g/dL Final    Hematocrit 07/01/2019 30.6* 40.0 - 54.0 % Final    Mean Corpuscular Volume 07/01/2019 73* 82 - 98 fL Final    Mean Corpuscular Hemoglobin 07/01/2019 21.1* 27.0 - 31.0 pg Final    Mean Corpuscular Hemoglobin Conc 07/01/2019 28.8* 32.0 - 36.0 g/dL Final    RDW 07/01/2019 16.9* 11.5 - 14.5 % Final    Platelets 07/01/2019 420* 150 - 350 K/uL Corrected    Platelets are clumped on smear.Platelet count may be affected.    MPV 07/01/2019 10.0  9.2 - 12.9 fL Final    Immature Granulocytes 07/01/2019 2.4* 0.0 - 0.5 % Final    Gran # (ANC) 07/01/2019 16.0* 1.8 - 7.7 K/uL Final    Immature Grans (Abs) 07/01/2019 0.48* 0.00 - 0.04 K/uL Final    Comment: Mild elevation in immature granulocytes is non specific and   can be seen in a variety of conditions including stress response,   acute inflammation, trauma and pregnancy. Correlation with other   laboratory and clinical findings is essential.      Lymph # 07/01/2019 1.2  1.0 - 4.8 K/uL Final    Mono # 07/01/2019 2.4* 0.3 - 1.0 K/uL Final    Eos # 07/01/2019 0.0  0.0 - 0.5 K/uL Final    Baso # 07/01/2019 0.05  0.00 - 0.20 K/uL Final    nRBC 07/01/2019 4* 0 /100 WBC Final    Gran% 07/01/2019 79.7* 38.0 - 73.0 % Final    Lymph% 07/01/2019 5.7* 18.0 - 48.0 % Final    Mono% 07/01/2019 11.9  4.0 - 15.0 % Final    Eosinophil% 07/01/2019 0.1  0.0 - 8.0 % Final    Basophil% 07/01/2019 0.2  0.0 - 1.9 % Final    Platelet Estimate 07/01/2019 Increased*  Final    Aniso 07/01/2019 Moderate   Final    Poik 07/01/2019 Slight   Final    Poly 07/01/2019 Occasional   Final    Spherocytes 07/01/2019 Occasional   Final    Schistocytes 07/01/2019 Present   Final    Differential Method 07/01/2019 Automated   Final    Sodium 07/01/2019 133* 136 - 145 mmol/L Final    Potassium 07/01/2019 5.9* 3.5 - 5.1 mmol/L Final    *No Visible Hemolysis    Chloride 07/01/2019 98  95 - 110 mmol/L Final    CO2 07/01/2019 25  23 - 29 mmol/L Final     Glucose 07/01/2019 90  70 - 110 mg/dL Final    BUN, Bld 07/01/2019 25* 6 - 20 mg/dL Final    Creatinine 07/01/2019 1.1  0.5 - 1.4 mg/dL Final    Calcium 07/01/2019 8.3* 8.7 - 10.5 mg/dL Final    Total Protein 07/01/2019 5.3* 6.0 - 8.4 g/dL Final    Albumin 07/01/2019 2.2* 3.5 - 5.2 g/dL Final    Total Bilirubin 07/01/2019 3.1* 0.1 - 1.0 mg/dL Final    Comment: For infants and newborns, interpretation of results should be based  on gestational age, weight and in agreement with clinical  observations.  Premature Infant recommended reference ranges:  Up to 24 hours.............<8.0 mg/dL  Up to 48 hours............<12.0 mg/dL  3-5 days..................<15.0 mg/dL  6-29 days.................<15.0 mg/dL      Alkaline Phosphatase 07/01/2019 597* 55 - 135 U/L Final    AST 07/01/2019 743* 10 - 40 U/L Final    ALT 07/01/2019 488* 10 - 44 U/L Final    Anion Gap 07/01/2019 10  8 - 16 mmol/L Final    eGFR if African American 07/01/2019 >60.0  >60 mL/min/1.73 m^2 Final    eGFR if non African American 07/01/2019 >60.0  >60 mL/min/1.73 m^2 Final    Comment: Calculation used to obtain the estimated glomerular filtration  rate (eGFR) is the CKD-EPI equation.       Troponin I 07/01/2019 <0.006  0.000 - 0.026 ng/mL Final    Comment: The reference interval for Troponin I represents the 99th percentile   cutoff   for our facility and is consistent with 3rd generation assay   performance.      Troponin I 07/01/2019 <0.006  0.000 - 0.026 ng/mL Final    Comment: The reference interval for Troponin I represents the 99th percentile   cutoff   for our facility and is consistent with 3rd generation assay   performance.      BNP 07/01/2019 75  0 - 99 pg/mL Final    Values of less than 100 pg/ml are consistent with non-CHF populations.    Phosphorus 07/01/2019 2.2* 2.7 - 4.5 mg/dL Final    Magnesium 07/01/2019 2.5  1.6 - 2.6 mg/dL Final   Admission on 06/20/2019, Discharged on 06/27/2019   No results displayed because visit  has over 200 results.        Pathology:  ORDERING PHYSICIAN(S)  MELANI GONZALEZ  CLINICAL DIAGNOSIS/INFORMATION  Clinical information: liver mass concern for malignancy  Multiple liver lesions. Suspect metastatic carcinoma. No known primary.  PreOperative Diagnosis  PostOperative Diagnosis  1. Non Small Cell Ca  SPECIMEN  1) Liver Core Bx (Rad)  FINAL PATHOLOGIC DIAGNOSIS  LIVER BIOPSY:  METASTATIC ADENOCARCINOMA--SEE DESCRIPTION  Diagnosed by: Pranay Boyle M.D.  (Electronically Signed: 2019-06-26 15:45:34)  Microscopic Examination  Adenocarcinoma is present. The morphology is typical for that seen in adenocarcinoma of colonic origin. Negativity  for cytokeratin 20 with positivity for cytokeratin 7 is against a diagnosis of colonic adenocarcinoma, however. The  tumor is TTF negative which is against a diagnosis of adenocarcinoma of lung origin . CDX2 positivity is in favor of  gastrointestinal tract origin. The morphology here conceivably might be seen in a small intestinal adenocarcinoma.  The morphology does not point towards stomach, pancreas, or biliary tract. Adenocarcinoma of the very distal colon  could be cytokeratin 20 negative and cytokeratin 7 positive. The positive and negative controls stained appropriately.    Imaging:       EXAMINATION:  US ABDOMEN LIMITED    CLINICAL HISTORY:  Upper abdominal pain, unspecified    TECHNIQUE:  Limited ultrasound of the right upper quadrant of the abdomen (including pancreas, liver, gallbladder, common bile duct, and right kidney) was performed.    COMPARISON:  U/S abdomen limited 06/25/2018; CTA chest non-coronary PE study 07/01/2019    FINDINGS:  There are innumerable lesions present diffusely throughout the hepatic parenchyma.  There is no intra or extrahepatic bile duct dilatation.  The common duct measures 3 mm.    The gallbladder is unremarkable with no evidence of wall thickening, sonographic Borjas's sign, or cholelithiasis.  There is trace pericholecystic  fluid.  The gallbladder wall measures 3 mm.    The visualized portions of the pancreas are unremarkable.    The right kidney is unremarkable.    There is no free fluid within the visualized abdomen.      Impression       Innumerable liver lesions.    No evidence of acute cholecystitis.    No significant change from prior ultrasound examination.    Electronically signed by resident: Kurtis Li  Date: 07/01/2019  Time: 14:50    Electronically signed by: Ronald Diana MD  Date: 07/01/2019  Time: 15:30            EXAMINATION:  CTA CHEST NON CORONARY    CLINICAL HISTORY:  Chest pain, acute, PE suspected, intermed prob, positive D-dimer;    TECHNIQUE:  During intravenous bolus injection of contrast medium and using low dose technique, the chest was surveyed from above the pulmonary apices through the posterior costophrenic angles using dedicated CT angiographic technique.  Data was reconstructed for multiplanar images in axial, sagittal and coronal planes and for maximal intensity projection images in the axial plane.  Images were acquired without gating.    All CT scans at this facility use dose modulation, iterative reconstruction and/or weight based dosing when appropriate to reduce radiation dose to as low as reasonably achievable.    Xray dose: DLP = 212.40 mGy-cm.    COMPARISON:  Chest radiograph: 07/01/2019.    Chest CT with    CONTRAST:  06/22/2019.    CT of the abdomen and pelvis: 06/20/2019.    FINDINGS:  Base of Neck: No significant abnormality.    Aorta: Left-sided aortic arch with 3 arterial branches.  The aorta maintains normal caliber, contour and course. I do not identify aortic or coronary calcification noting that the presence of intravenous contrast medium can obscure the conspicuity of calcification..    Heart/pericardium: Normal size.  No pericardial effusion or calcification.  No evidence of elevated right heart pressures.    Pulmonary arteries: Pulmonary arteries distribute normally.   Pulmonary arteries are sufficiently opacified for diagnostic assessment.  I detect no pulmonary thromboembolism or other filling defect and no pulmonary infarct, noting that fine detail is limited by respiratory motion..    Pulmonary veins, left atrium:    There are four pulmonary veins that return to the left atrium.    Jessica/Mediastinum: No pathologic jeff enlargement.    Pleura: No pleural fluid.No pleural calcification.    Esophagus: The esophagus is dilated in its thoracic extent.  Material is retained in the mid and distal esophagus.  Large hiatal hernia is present.  These findings are similar to 6/20 February 2019.    Upper Abdomen and diaphragm:    -The liver is enlarged and heterogeneous similar to the observation on CT of the abdomen and pelvis dated 06/20/2019 concerning for numerous metastases or disseminated hepatocellular carcinoma.    -The right hemidiaphragm is mildly elevated due to the enlarged liver.    Thoracic soft tissues: Normal with lean body habitus    Bones: Mild scoliosis.  No acute fracture. No suspicious lytic or sclerotic lesion identified on this study performed with CTA technique.    Airways and lungs:    -Detail of the airways and lungs was limited by dedicated CTA technique and by patient motion.    -There is compressive atelectasis in the right lower lobe and middle lobe due to elevation of the right hemidiaphragm described above.    -I detect no other pulmonary disease and specifically no pneumonia, aspiration, or pulmonary edema.      Impression       1.  I do not identify pulmonary thromboembolism, pulmonary infarct, pneumonia or metastatic disease in this patient with enlarged heterogeneous liver.    2.  No pulmonary edema or pleural fluid.    3.  There is large hiatal hernia and dilatation of the thoracic aorta.  Retained material is present in the mid and distal portions of the esophagus; the patient is at risk for aspiration.      Electronically signed by: Cristal Han  MD  Date: 07/01/2019  Time: 12:04         EXAMINATION:  MRI ABDOMEN W WO CONTRAST    CLINICAL HISTORY:  concern for HCC versus cholangio;    TECHNIQUE:  Multisequence, multiplanar MRI of the abdomen performed per liver protocol before and after administration of 10 mL Gadavist intravenous contrast.    COMPARISON:  CT abdomen pelvis 06/20/2019    FINDINGS:  Lung bases without acute abnormality.  Visualized aspects of the heart within normal limits.  Large hiatal hernia.    Liver: Markedly enlarged liver with innumerable hepatic masses of varying sizes and signal characteristics.  Masses are hypointense on T1 and demonstrate peripherally hyperintense target appearance on DWI with peripheral enhancement on delayed phase imaging.  Largest mass in the right hepatic lobe measures approximately 7.4 cm with the largest mass in the left hepatic lobe measuring approximately 6.1 cm (axial series 13, images 41 and 66).  Redemonstration of mariam hepatis lymphadenopathy with conglomerate of lymph nodes measuring approximately 3.6 cm (axial series 4, image 73).  Portal vein and splenic veins are patent.    Biliary: Gallbladder is contracted.  No intrahepatic or extrahepatic biliary ductal dilatation.    Pancreas: Unremarkable.  No pancreatic ductal dilatation.    Spleen: Normal size.  No focal lesions.    Adrenal glands: Unremarkable.    Kidneys: Normal in size and location without enhancing renal mass or hydronephrosis.  Subcentimeter T2 hyperintense lesions in bilateral renal parenchyma which are too small to fully characterize but may represent renal cysts.    Miscellaneous: Visualized bowel loops are unremarkable.  Trace ascites.      Impression       Markedly enlarged liver with innumerable hepatic masses which demonstrate signal characteristics favoring cholangiocarcinoma.  Recommend correlation with tissue sampling as the appearance is nonspecific/indeterminate for histology.    Index lesions are as follows:    -Right  hepatic lobe: 7.4 cm (axial series 13 image 66)    -Left hepatic lobe: 6.1 cm (axial series 13, image 41)    Redemonstration of mariam hepatis lymphadenopathy.    Large hiatal hernia.    This report was flagged in Epic as abnormal.    Electronically signed by resident: John García  Date: 06/22/2019  Time: 08:05    Electronically signed by: Aries Miller MD  Date: 06/22/2019  Time: 08:53         Assessment:       1. HCC (hepatocellular carcinoma)    2. Adenocarcinoma of colon    3. Liver metastases    4. Neoplasm related pain    5. Hyperbilirubinemia    6. Hyperkalemia    7. Hypoalbuminemia           Plan:     Metastatic cancer, unknown primary   - biopsy completed on 6/24, core biopsy with unknown primary but thought roberto small bowel vs. Distal colon primary   - pt with poor PS, ECOG 2 currently due to pain med use and significant peripheral edema  - due to suggestion of primary small bowel vs. Colonic disease, placed plan in for FOLFIRINOX  - drug choice may ultimately be limited due to trend of liver enzymes and hyperbilirubinemia  - discussed this with pt and friend who was present for the visit; will re-evaluate this at next visit  - pt agrees to trying FOLFIRNOX as soon as it is possible to do so; discussed risks/benefits of this medication    Transaminitis  - AST of 743, ALT of 488  - secondary to liver lesions, which is related to neoplasm  - will continue to monitor    Hyperbilrubinemia  - U/S Abdomen negative for biliary obstruciton on 7/1  - bilirubin of 3.1, likely secondary to volume of liver disease  - prescribed ursodiol 300 mg BID    Peripheral Edema  - significant lower extremity edema bilaterally   - start lasix 40 mg     Hyponatremia  - sodium of 133  - stable currently, will continue to monitor    Hyperkalemia  - currently is 5.9  - prescribed one dose of lactulose    Pain secondary to neoplasm  - currently on oxy 10 q4H  - will switch to MS contin 30 mg BID for regular scheduled medication and  then oxycodone 10 mg for PRN q6-q8H use    Discussed with Dr. Hussein    Follow-up:  Discussing with Dr. Casey about port placement this week  Discussed risks/beneifts of FOLORINOX, will CONSENT and plan to start next week     Henry Thompson MD  Hematology and Oncology Fellow, PGY IV  [facility]    Distress Screening Results: Psychosocial Distress screening score of Distress Score: 7 noted and reviewed. No intervention indicated.   Will discuss Oncology psychology follow-up at next visit      I have reviewed the notes, assessments, and/or procedures performed by the housestaff, as above.  I have personally interviewed and examined the patient at the beside, and rounded with the housestaff. I concur with her/his assessment and plan and the documentation of Jr Marsh.  I, Dr. Hany Hussein, personally spent more than  25 mins during this encounter, greater than 50% was spent in direct counseling and/or coordination of care.     Hany Hussein M.D., M.S., F.A.C.P.  Hematology/Oncology Attending  [facility]

## 2019-07-01 NOTE — Clinical Note
Would like to start FOLFIRINOX on this pt next week; he will need labs again on Monday - CBC, CMP, Mg, PHos. HE should be getting surgery referral and port placement soon too. Please call patient with appointment times, thanks.

## 2019-07-01 NOTE — Clinical Note
Hi Dr. Casey, I wanted to request an urgent port placement on this patient who I plan to start chemo on next week. Will place a general surgery consult too, thanks!

## 2019-07-02 PROBLEM — G89.3 NEOPLASM RELATED PAIN: Status: ACTIVE | Noted: 2019-01-01

## 2019-07-02 PROBLEM — E80.6 HYPERBILIRUBINEMIA: Status: ACTIVE | Noted: 2019-01-01

## 2019-07-02 PROBLEM — C78.7 LIVER METASTASES: Status: ACTIVE | Noted: 2019-01-01

## 2019-07-02 PROBLEM — C18.9 ADENOCARCINOMA OF COLON: Status: ACTIVE | Noted: 2019-01-01

## 2019-07-02 PROBLEM — E87.5 HYPERKALEMIA: Status: ACTIVE | Noted: 2019-01-01

## 2019-07-02 PROBLEM — E88.09 HYPOALBUMINEMIA: Status: ACTIVE | Noted: 2019-01-01

## 2019-07-02 PROBLEM — C22.0 HCC (HEPATOCELLULAR CARCINOMA): Status: RESOLVED | Noted: 2019-01-01 | Resolved: 2019-01-01

## 2019-07-02 NOTE — TELEPHONE ENCOUNTER
----- Message from Henry Thompson MD sent at 7/1/2019  9:44 PM CDT -----  Please set him up for chemo class at next availability, thanks

## 2019-07-03 PROBLEM — C80.1 ADENOCARCINOMA: Status: ACTIVE | Noted: 2019-01-01

## 2019-07-03 PROBLEM — A41.50 SEPSIS DUE TO GRAM NEGATIVE BACTERIA: Status: RESOLVED | Noted: 2019-01-01 | Resolved: 2019-01-01

## 2019-07-03 PROBLEM — C22.9 ADENOCARCINOMA DETERMINED BY BIOPSY OF LIVER: Status: ACTIVE | Noted: 2019-01-01

## 2019-07-03 PROBLEM — D62 ACUTE POST-HEMORRHAGIC ANEMIA: Status: ACTIVE | Noted: 2019-01-01

## 2019-07-03 PROBLEM — K92.0 HEMATEMESIS: Status: ACTIVE | Noted: 2019-01-01

## 2019-07-03 NOTE — SUBJECTIVE & OBJECTIVE
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       Past Surgical History:   Procedure Laterality Date    KNEE SURGERY      SHOULDER SURGERY         Review of patient's allergies indicates:  No Known Allergies    No current facility-administered medications on file prior to encounter.      Current Outpatient Medications on File Prior to Encounter   Medication Sig    bisacodyl (DULCOLAX) 10 mg Supp Place 1 suppository (10 mg total) rectally daily as needed (constipation).    DULCOLAX, BISACODYL, ORAL Take 1 tablet by mouth daily as needed (constipation).    furosemide (LASIX) 40 MG tablet Take 1 tablet (40 mg total) by mouth once daily.    lidocaine-prilocaine (EMLA) cream Apply topically as needed. Please place cream over port site one hour prior to labs    magnesium hydroxide 400 mg/5 ml (MILK OF MAGNESIA) 400 mg/5 mL Susp Take 30 mLs (2,400 mg total) by mouth once daily.    morphine (MS CONTIN) 30 MG 12 hr tablet Take 1 tablet (30 mg total) by mouth 2 (two) times daily.    ondansetron (ZOFRAN-ODT) 8 MG TbDL Take 1 tablet (8 mg total) by mouth every 12 (twelve) hours as needed (nausea).    oxyCODONE (ROXICODONE) 10 mg Tab immediate release tablet Take 1 tablet (10 mg total) by mouth every 8 (eight) hours as needed (as needed for severe pain).    polyethylene glycol (GLYCOLAX) 17 gram/dose powder Take 17 g by mouth daily as needed (constipation).    senna-docusate 8.6-50 mg (PERICOLACE) 8.6-50 mg per tablet Take 2 tablets by mouth 2 (two) times daily.    ursodiol (ACTIGALL) 300 mg capsule Take 1 capsule (300 mg total) by mouth 2 (two) times daily.     Family History     None        Tobacco Use    Smoking status: Former Smoker    Smokeless tobacco: Never Used   Substance and Sexual Activity    Alcohol use: Not Currently     Comment: rarely    Drug use: Never    Sexual activity: Not on file     Review of Systems    Constitutional: Positive for appetite change, chills, fatigue and unexpected weight change.   Respiratory: Positive for shortness of breath. Negative for chest tightness.    Cardiovascular: Positive for leg swelling. Negative for chest pain and palpitations.   Gastrointestinal: Positive for abdominal distention, abdominal pain, diarrhea, nausea and vomiting.   Genitourinary: Negative for difficulty urinating and dysuria.   Musculoskeletal: Negative for arthralgias, gait problem, joint swelling and myalgias.   Neurological: Positive for weakness. Negative for dizziness, light-headedness and headaches.   Hematological: Bruises/bleeds easily.   Psychiatric/Behavioral: Negative for agitation, behavioral problems, confusion, decreased concentration and dysphoric mood.   All other systems reviewed and are negative.    Objective:     Vital Signs (Most Recent):  Temp: 97.5 °F (36.4 °C) (07/03/19 0820)  Pulse: 89 (07/03/19 0820)  Resp: 16 (07/03/19 0820)  BP: (!) 96/53 (07/03/19 0820)  SpO2: 100 % (07/03/19 0820) Vital Signs (24h Range):  Temp:  [97.4 °F (36.3 °C)-97.6 °F (36.4 °C)] 97.5 °F (36.4 °C)  Pulse:  [] 89  Resp:  [12-18] 16  SpO2:  [96 %-100 %] 100 %  BP: ()/(53-86) 96/53     Weight: 80.9 kg (178 lb 5.6 oz)  Body mass index is 26.34 kg/m².    Physical Exam   Constitutional: He is oriented to person, place, and time. He appears cachectic. He is cooperative. He has a sickly appearance. He appears ill.   Eyes: Conjunctivae and EOM are normal.   Neck: Normal range of motion. Neck supple.   Cardiovascular: Normal rate, regular rhythm and normal heart sounds.   Pulmonary/Chest: Effort normal and breath sounds normal. No stridor. No respiratory distress. He has no wheezes. He has no rales. He exhibits no tenderness.   Abdominal: He exhibits distension and ascites. Bowel sounds are decreased. There is hepatomegaly. There is tenderness.   Musculoskeletal: Normal range of motion. He exhibits edema. He exhibits  no tenderness.   Neurological: He is alert and oriented to person, place, and time.   Skin: Skin is warm and dry.   Psychiatric: He has a normal mood and affect. His behavior is normal. Judgment and thought content normal.         CRANIAL NERVES     CN III, IV, VI   Extraocular motions are normal.        Significant Labs:   CBC:   Recent Labs   Lab 07/01/19  1012 07/02/19  2312   WBC 20.09* 17.59*   HGB 8.8* 9.2*   HCT 30.6* 32.3*   * 429*     CMP:   Recent Labs   Lab 07/01/19  1012 07/02/19  2312 07/03/19  0417   * 131* 134*   K 5.9* 5.9* 5.5*   CL 98 93* 100   CO2 25 23 19*   GLU 90 69* 83   BUN 25* 33* 31*   CREATININE 1.1 1.4 1.1   CALCIUM 8.3* 8.5* 7.7*   PROT 5.3* 5.6*  --    ALBUMIN 2.2* 2.2*  --    BILITOT 3.1* 4.7*  --    ALKPHOS 597* 643*  --    * 773*  --    * 504*  --    ANIONGAP 10 15 15   EGFRNONAA >60.0 57.4* >60.0

## 2019-07-03 NOTE — PLAN OF CARE
CM at bedside to discuss discharge planning assessment.   Patient lives with friend in a 2-story home.  Independent with ADL and does not use medical equipment.  CM name and phone # written on board and explained CM services during patient's stay in hospital.   My Health packet discussed and left with patient.       07/03/19 1000   Discharge Assessment   Assessment Type Discharge Planning Assessment   Confirmed/corrected address and phone number on facesheet? Yes   Assessment information obtained from? Patient   Expected Length of Stay (days) 3   Communicated expected length of stay with patient/caregiver yes   Prior to hospitilization cognitive status: Alert/Oriented   Prior to hospitalization functional status: Independent   Current cognitive status: Alert/Oriented   Current Functional Status: Independent   Facility Arrived From: home   Lives With friend(s)   Able to Return to Prior Arrangements yes   Is patient able to care for self after discharge? Yes   Who are your caregiver(s) and their phone number(s)? self   Patient's perception of discharge disposition home or selfcare   Readmission Within the Last 30 Days current reason for admission unrelated to previous admission   If yes, most recent facility name: [facility]   Patient currently being followed by outpatient case management? No   Patient currently receives any other outside agency services? No   Equipment Currently Used at Home none   Do you have any problems affording any of your prescribed medications? No   Is the patient taking medications as prescribed? yes   Does the patient have transportation home? Yes   Transportation Anticipated family or friend will provide   Dialysis Name and Scheduled days n/a   Does the patient receive services at the Coumadin Clinic? No   Discharge Plan A Home   Discharge Plan B Home;Home Health   DME Needed Upon Discharge    (TBD)   Patient/Family in Agreement with Plan yes   Readmission Questionnaire   At the time of your  discharge, did someone talk to you about what your health problems were? Yes   At the time of discharge, did someone talk to you about what to watch out for regarding worsening of your health problem? Yes   At the time of discharge, did someone talk to you about what to do if you experienced worsening of your health problem? Yes   At the time of discharge, did someone talk to you about which medication to take when you left the hospital and which ones to stop taking? Yes   At the time of discharge, did someone talk to you about when and where to follow up with a doctor after you left the hospital? Yes   What do you believe caused you to be sick enough to be re-admitted? vomitting   How often do you need to have someone help you when you read instructions, pamphlets, or other written material from your doctor or pharmacy? Never   Do you have problems taking your medications as prescribed? No   Do you have any problems affording any of  your prescribed medications? No   Do you have problems obtaining/receiving your medications? No   Does the patient have transportation to healthcare appointments? Yes   Living Arrangements house   Does the patient have family/friends to help with healtcare needs after discharge? yes   Are you currently feeling confused? No   Are you currently having problems thinking? No   Are you currently having memory problems? No     Primary Doctor No  None    [facility] [address] [facility] [address] AT [facility] & [place]  [address]  [facility] [address]  Phone: [phone] Fax: [phone]    [place] [address] @ [address] AT [address] [address]  Phone: [phone] Fax: [phone]    [facility] [id number] [facility] [address] AT [facility] [place]  [address]  [facility] [address]  Phone:  [phone] Fax: [phone]    Extended Emergency Contact Information  Primary Emergency Contact: Pipo Du  Mobile Phone: [phone]  Relation: Friend  Preferred language: English   needed? No  Secondary Emergency Contact: Caroline Marsh  Mobile Phone: [phone]  Relation: Mother

## 2019-07-03 NOTE — ASSESSMENT & PLAN NOTE
"53 y.o. male with recently diagnosed adenocarcinoma with mets to liver (unclear primary, small bowel vs distal colon) who presents with hematemesis, "dark purple or black" vomiting, about two cupfuls, denies BRB, or ever having blood in emesis or stool before, has had watery diarrhea for the past few weeks, no AC, OTC NSAID use x 2 (advil). Pos orthostatics in ED, received IVF, now HDS, on RA. Hb 8.8, WBC 17.5, Plt 424, Cr 1.1, BUN 31. GI was consulted for hematemesis.     Rec:  -- EGD today  -- Keep NPO  -- Continue protonix 40 BID  -- Transfuse Hb > 7  -- Please maintain two large bore IV  "

## 2019-07-03 NOTE — ED TRIAGE NOTES
"Pt repotrs N/V that began around 2000 this evening, pt reports that he is usually nauseous but tonight began vomiting which is abnormal for him. States that the vomit was dark in color and when he spoke to his oncologist, he referred him to the ED. Pt also reports diarrhea but unsure of blood. Pt reports some SOB. Also reports R sided rib pain, and states that "not moving" helps the pain. Pt reports diagnosis of liver cancer, no mets. Not currently on chemo or radiation    Patient Identifiers for Jr Marsh checked and correct  LOC: The patient is awake, alert and aware of environment with an appropriate affect, the patient is oriented x 3 and speaking appropriate.  APPEARANCE: Patient resting comfortably and in no acute distress, patient is clean and well groomed, patient's clothing is properly fastened.  SKIN: The skin is warm and dry, patient has normal skin turgor and moist mucus membranes,no rashes or lesions.Skin Intact , No Breakdown Noted. Skin appears jaundiced  Musculoskeletal :  Normal range of motion noted. Moves all extremeties well, No swelling or tenderness noted  RESPIRATORY: Airway is open and patent, respirations are spontaneous, patient has a normal effort and rate.  CARDIAC: Patient has a normal rate and rhythm, no periphreal edema noted, capillary refill < 3 seconds.   ABDOMEN: Soft and non tender to palpation, some distention noted.   PULSES: 2+  And symmetrical in all extremeties  NEUROLOGIC: PERRL. facial expression is symmetrical, patient moving all extremities, normal sensation in all extremities when touched with a finger.The patient is awake, alert and cooperative with a calm affect, patient is aware of environment.    Will continue to monitor    "

## 2019-07-03 NOTE — ASSESSMENT & PLAN NOTE
Consider due to PUD due to NSAID use verses esophageal varices  GI Consulted with plans to do an endoscopy   80 mg IV Protonix give in ED with 40 mg IV BID after that  Patient is NPO  Zofran 4 mg IV for Nausea

## 2019-07-03 NOTE — TRANSFER OF CARE
"Anesthesia Transfer of Care Note    Patient: Jr Marsh    Procedure(s) Performed: Procedure(s) (LRB):  EGD (ESOPHAGOGASTRODUODENOSCOPY) (N/A)    Patient location: [facility]    Anesthesia Type: general    Transport from OR: Transported from OR on room air with adequate spontaneous ventilation    Post pain: adequate analgesia    Post assessment: no apparent anesthetic complications and tolerated procedure well    Post vital signs: stable    Level of consciousness: lethargic and responds to stimulation    Nausea/Vomiting: no nausea/vomiting    Complications: none    Transfer of care protocol was followed      Last vitals:   Visit Vitals  /61   Pulse 97   Temp 36.6 °C (97.9 °F) (Oral)   Resp 16   Ht 5' 9" (1.753 m)   Wt 80.9 kg (178 lb 5.6 oz)   SpO2 98%   BMI 26.34 kg/m²     "

## 2019-07-03 NOTE — H&P
[facility] Medicine  History & Physical    Patient Name: Jr Marsh  MRN: [id number]  Admission Date: 7/2/2019  Attending Physician: Colin Ruggiero MD   Primary Care Provider: Primary Doctor [facility] Medicine Team: [facility] MED 3 Nancy Carrasco DO     Patient information was obtained from patient and ER records.     Subjective:     Principal Problem:Hematemesis    Chief Complaint:   Chief Complaint   Patient presents with    Hematemesis     Pt reports 5 episodes today, emesis appears black. Pt c/o RUQ abdominal pain X2 weeks, hx of liver cancer, pt jaundiced. Pt denies increased in pain. Pt has not had any treatments for cancer. Also c/o light headedness, fatigue.         HPI: Mr. Jr Marsh is a 54 y/o male with a recent biopsy confirmed diagnosis of adenocarcinoma of the Liver with possible metastasis to the colon diagnosed 3 weeks ago. The patient has been cared for outpatient by his oncologist Dr. Quinonez. The patient reports he is scheduled for a chemo class next Monday and also is to be scheduled for a port placement. The patient presents to the ED last night with the complaint of vomitting that appeared coffee ground in consistancy and later vomitted in the ED confirming coffee ground emesis. This the first day the patient has experienced vomiting but he reports being nauseated over the course of 3 weeks with symptoms worsening with any form of exertion. He reports having sharp pain at his liver biopsy site and and feeling of nausea upon laying down. but denies CP, SOB, light headedness. He reports having B/L LE edema an swelling in his abdomen and diarrhea for the last 2 weeks with no bloody or black stooling.     In the ED the patient was found to have hyperkalemia and was given insulin and D10 250 bolus fluids to try and shift the potassium. This caused his blood glucose to drop to 59 and he became tachycardic. 80 mg IV protonix was given in the ED. He was transferred to  the floors for the medicine team to continue his care. GI consults were placed as well.    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       Past Surgical History:   Procedure Laterality Date    KNEE SURGERY      SHOULDER SURGERY         Review of patient's allergies indicates:  No Known Allergies    No current facility-administered medications on file prior to encounter.      Current Outpatient Medications on File Prior to Encounter   Medication Sig    bisacodyl (DULCOLAX) 10 mg Supp Place 1 suppository (10 mg total) rectally daily as needed (constipation).    DULCOLAX, BISACODYL, ORAL Take 1 tablet by mouth daily as needed (constipation).    furosemide (LASIX) 40 MG tablet Take 1 tablet (40 mg total) by mouth once daily.    lidocaine-prilocaine (EMLA) cream Apply topically as needed. Please place cream over port site one hour prior to labs    magnesium hydroxide 400 mg/5 ml (MILK OF MAGNESIA) 400 mg/5 mL Susp Take 30 mLs (2,400 mg total) by mouth once daily.    morphine (MS CONTIN) 30 MG 12 hr tablet Take 1 tablet (30 mg total) by mouth 2 (two) times daily.    ondansetron (ZOFRAN-ODT) 8 MG TbDL Take 1 tablet (8 mg total) by mouth every 12 (twelve) hours as needed (nausea).    oxyCODONE (ROXICODONE) 10 mg Tab immediate release tablet Take 1 tablet (10 mg total) by mouth every 8 (eight) hours as needed (as needed for severe pain).    polyethylene glycol (GLYCOLAX) 17 gram/dose powder Take 17 g by mouth daily as needed (constipation).    senna-docusate 8.6-50 mg (PERICOLACE) 8.6-50 mg per tablet Take 2 tablets by mouth 2 (two) times daily.    ursodiol (ACTIGALL) 300 mg capsule Take 1 capsule (300 mg total) by mouth 2 (two) times daily.     Family History     None        Tobacco Use    Smoking status: Former Smoker    Smokeless tobacco: Never Used   Substance and Sexual Activity    Alcohol use: Not Currently      Comment: rarely    Drug use: Never    Sexual activity: Not on file     Review of Systems   Constitutional: Positive for appetite change, chills, fatigue and unexpected weight change.   Respiratory: Positive for shortness of breath. Negative for chest tightness.    Cardiovascular: Positive for leg swelling. Negative for chest pain and palpitations.   Gastrointestinal: Positive for abdominal distention, abdominal pain, diarrhea, nausea and vomiting.   Genitourinary: Negative for difficulty urinating and dysuria.   Musculoskeletal: Negative for arthralgias, gait problem, joint swelling and myalgias.   Neurological: Positive for weakness. Negative for dizziness, light-headedness and headaches.   Hematological: Bruises/bleeds easily.   Psychiatric/Behavioral: Negative for agitation, behavioral problems, confusion, decreased concentration and dysphoric mood.   All other systems reviewed and are negative.    Objective:     Vital Signs (Most Recent):  Temp: 97.5 °F (36.4 °C) (07/03/19 0820)  Pulse: 89 (07/03/19 0820)  Resp: 16 (07/03/19 0820)  BP: (!) 96/53 (07/03/19 0820)  SpO2: 100 % (07/03/19 0820) Vital Signs (24h Range):  Temp:  [97.4 °F (36.3 °C)-97.6 °F (36.4 °C)] 97.5 °F (36.4 °C)  Pulse:  [] 89  Resp:  [12-18] 16  SpO2:  [96 %-100 %] 100 %  BP: ()/(53-86) 96/53     Weight: 80.9 kg (178 lb 5.6 oz)  Body mass index is 26.34 kg/m².    Physical Exam   Constitutional: He is oriented to person, place, and time. He appears cachectic. He is cooperative. He has a sickly appearance. He appears ill.   Eyes: Conjunctivae and EOM are normal.   Neck: Normal range of motion. Neck supple.   Cardiovascular: Normal rate, regular rhythm and normal heart sounds.   Pulmonary/Chest: Effort normal and breath sounds normal. No stridor. No respiratory distress. He has no wheezes. He has no rales. He exhibits no tenderness.   Abdominal: He exhibits distension and ascites. Bowel sounds are decreased. There is hepatomegaly.  There is tenderness.   Musculoskeletal: Normal range of motion. He exhibits edema. He exhibits no tenderness.   Neurological: He is alert and oriented to person, place, and time.   Skin: Skin is warm and dry.   Psychiatric: He has a normal mood and affect. His behavior is normal. Judgment and thought content normal.         CRANIAL NERVES     CN III, IV, VI   Extraocular motions are normal.        Significant Labs:   CBC:   Recent Labs   Lab 07/01/19  1012 07/02/19  2312   WBC 20.09* 17.59*   HGB 8.8* 9.2*   HCT 30.6* 32.3*   * 429*     CMP:   Recent Labs   Lab 07/01/19  1012 07/02/19  2312 07/03/19  0417   * 131* 134*   K 5.9* 5.9* 5.5*   CL 98 93* 100   CO2 25 23 19*   GLU 90 69* 83   BUN 25* 33* 31*   CREATININE 1.1 1.4 1.1   CALCIUM 8.3* 8.5* 7.7*   PROT 5.3* 5.6*  --    ALBUMIN 2.2* 2.2*  --    BILITOT 3.1* 4.7*  --    ALKPHOS 597* 643*  --    * 773*  --    * 504*  --    ANIONGAP 10 15 15   EGFRNONAA >60.0 57.4* >60.0           Assessment/Plan:     * Hematemesis  Consider due to PUD due to NSAID use verses esophageal varices  GI Consulted with plans to do an endoscopy   80 mg IV Protonix give in ED with 40 mg IV BID after that  Patient is NPO  Zofran 4 mg IV for Nausea          Hyperkalemia  In the ED attempted to shift with 250 D10 IV bolus and Insulin.  Started back on lasix 40 mg and lactulose 20 mg BID  EKG ordered - Asymptomatic with no peaked T waves      Neoplasm related pain  Started on home pain regimen with Oxycodone immediate release PRN      Metastatic adenocarcinoma  New diagnosis via liver biopsy with possible mets in the colon  Awaiting a port placement and has a scheduled chemo class next week.   Started on home pain regimen  Oncology consulted        Leukocytosis  Blood Cultures X 2 ordered        VTE Risk Mitigation (From admission, onward)    None             Nancy Carrasco DO  Department of [facility] Medicine   [facility]

## 2019-07-03 NOTE — ASSESSMENT & PLAN NOTE
In the ED attempted to shift with 250 D10 IV bolus and Insulin.  Started back on lasix 40 mg and lactulose 20 mg BID  EKG ordered - Asymptomatic with no peaked T waves

## 2019-07-03 NOTE — NURSING TRANSFER
Nursing Transfer Note      7/3/2019     Transfer To: 848A    Transfer via stretcher    Transfer with cardiac monitoring    Transported by pct    Medicines sent: na    Chart send with patient: Yes    Notified: family called in waiting area, no answer    Patient reassessed at: 1530    Upon arrival to floor: call bell in reach    Report called to BOO Nava

## 2019-07-03 NOTE — [employer] PATIENT INSTRUCTIONS
Discharge Summary/Instructions after an Endoscopic Procedure  Patient Name: Jr Marsh  Patient MRN: [id number]  Patient YOB: 1965 Wednesday, July 03, 2019  Sebastián Pearce MD  RESTRICTIONS:  During your procedure today, you received medications for sedation.  These   medications may affect your judgment, balance and coordination.  Therefore,   for 24 hours, you have the following restrictions:   - DO NOT drive a car, operate machinery, make legal/financial decisions,   sign important papers or drink alcohol.    ACTIVITY:  Today: no heavy lifting, straining or running due to procedural   sedation/anesthesia.  The following day: return to full activity including work.  DIET:  Eat and drink normally unless instructed otherwise.     TREATMENT FOR COMMON SIDE EFFECTS:  - Mild abdominal pain, nausea, belching, bloating or excessive gas:  rest,   eat lightly and use a heating pad.  - Sore Throat: treat with throat lozenges and/or gargle with warm salt   water.  - Because air was used during the procedure, expelling large amounts of air   from your rectum or belching is normal.  - If a bowel prep was taken, you may not have a bowel movement for 1-3 days.    This is normal.  SYMPTOMS TO WATCH FOR AND REPORT TO YOUR PHYSICIAN:  1. Abdominal pain or bloating, other than gas cramps.  2. Chest pain.  3. Back pain.  4. Signs of infection such as: chills or fever occurring within 24 hours   after the procedure.  5. Rectal bleeding, which would show as bright red, maroon, or black stools.   (A tablespoon of blood from the rectum is not serious, especially if   hemorrhoids are present.)  6. Vomiting.  7. Weakness or dizziness.  GO DIRECTLY TO THE NEAREST EMERGENCY ROOM IF YOU HAVE ANY OF THE FOLLOWING:      Difficulty breathing              Chills and/or fever over 101 F   Persistent vomiting and/or vomiting blood   Severe abdominal pain   Severe chest pain   Black, tarry stools   Bleeding- more than one  tablespoon   Any other symptom or condition that you feel may need urgent attention  Your doctor recommends these additional instructions:  If any biopsies were taken, your doctors clinic will contact you in 1 to 2   weeks with any results.  - Return patient to hospital day for ongoing care.   - Full liquid diet.   - Await pathology results.   For questions, problems or results please call your physician - Sebastián Pearce MD at Work:  [phone].  [facility], EMERGENCY ROOM PHONE NUMBER: [phone]  IF A COMPLICATION OR EMERGENCY SITUATION ARISES AND YOU ARE UNABLE TO REACH   YOUR PHYSICIAN - GO DIRECTLY TO THE EMERGENCY ROOM.  Sebastián Pearce MD  7/3/2019 3:01:23 PM  This report has been verified and signed electronically.  [employer]

## 2019-07-03 NOTE — ANESTHESIA PREPROCEDURE EVALUATION
07/03/2019  Jr Marsh is a 53 y.o., male.    Anesthesia Evaluation    I have reviewed the Patient Summary Reports.    I have reviewed the Nursing Notes.      Review of Systems  Anesthesia Hx:  No problems with previous Anesthesia    Hematology/Oncology:  Hematology Normal   Oncology Normal     EENT/Dental:EENT/Dental Normal   Cardiovascular:  Cardiovascular Normal     Pulmonary:  Pulmonary Normal    Renal/:  Renal/ Normal     Hepatic/GI:   Liver Disease, Adenocarcinoma determined by biopsy of liver  Liver metastases     Musculoskeletal:  Musculoskeletal Normal    Neurological:  Neurology Normal    Endocrine:  Endocrine Normal    Dermatological:  Skin Normal    Psych:  Psychiatric Normal           Physical Exam  General:  Well nourished    Airway/Jaw/Neck:  Airway Findings: Mouth Opening: Normal Tongue: Normal  General Airway Assessment: Adult  Mallampati: II  TM Distance: Normal, at least 6 cm        Eyes/Ears/Nose:  EYES/EARS/NOSE FINDINGS: Normal   Dental:  Dental Findings: In tact   Chest/Lungs:  Chest/Lungs Clear    Heart/Vascular:  Heart Findings: Normal Heart murmur: negative Vascular Findings: Normal    Abdomen:  Abdomen Findings: Normal    Musculoskeletal:  Musculoskeletal Findings: Normal   Skin:  Skin Findings: Normal    Mental Status:  Mental Status Findings: Normal        Anesthesia Plan  Type of Anesthesia, risks & benefits discussed:  Anesthesia Type:  general  Patient's Preference:   Intra-op Monitoring Plan:   Intra-op Monitoring Plan Comments:   Post Op Pain Control Plan:   Post Op Pain Control Plan Comments:   Induction:   IV  Beta Blocker:  Patient is not currently on a Beta-Blocker (No further documentation required).       Informed Consent: Patient understands risks and agrees with Anesthesia plan.  Questions answered. Anesthesia consent signed with patient.  ASA Score: 3     Day  of Surgery Review of History & Physical:    H&P update referred to the surgeon.         Ready For Surgery From Anesthesia Perspective.

## 2019-07-03 NOTE — HPI
"Jr Marsh is a 53 y.o. male with recently diagnosed adenocarcinoma with mets to liver (unclear primary, small bowel vs distal colon) who presents with hematemesis. Patient originally presented to hospital 7/1 c/o chest pain, but left AMA to attend his oncology clinic appt. He then re-presented yesterday evening (7/2) c/o "dark purple or black" vomiting. He reports 5 bouts of emesis prior to presentation, about two cupfuls. He denies BRB, or ever having blood in emesis or stool before. He has had 4 weeks of abdominal bloating / distension which led to his presentation for his original diagnosis, which has not acutely worsened. He denies fever / chills, chest pain, shortness of breath, lightheadedness / dizziness. He has had watery diarrhea for the past few weeks, with not blood or mucous. He denies taking blood thinners; reports taking advil x 2 a few days prior to presentation for abdominal pain, and no other medications. No family history of colon cancer. He reports having had a colonoscopy Dec 2017 with a benign polyp that was removed.     In the ED, patient was noted to be orthostatic and recevied fluid resuscitation, sat comfortably on room air. Hb 8.8, WBC 17.5, Plt 424, Cr 1.1, BUN 31. He was admitted to hospital medicine for further workup and evaluation. GI was consulted for hematemesis.   "

## 2019-07-03 NOTE — DISCHARGE INSTRUCTIONS

## 2019-07-03 NOTE — ASSESSMENT & PLAN NOTE
New diagnosis via liver biopsy with possible mets in the colon  Awaiting a port placement and has a scheduled chemo class next week.   Started on home pain regimen  Oncology consulted

## 2019-07-03 NOTE — PLAN OF CARE
SW following for D/C needs. SW is in communication with patient's CM and patient's Care Team - IM3. CHELLE will continue to follow.      07/03/19 3166   Post-Acute Status   Post-Acute Authorization Other   Other Status No Post-Acute Service Needs     Madalyn Guzman LMSW   - [facility]  Ext. [phone]

## 2019-07-03 NOTE — PROGRESS NOTES
Patient sleepy, easy to arouse, sitting up in stretcher with eyes closed. Patient complaining of nausea and states he feels relief with eyes closed. CBG obtained, resulted 65. Notified Dr. Soto with Anesthesia. Orders for infusion of 250 mL of 10% Dextrose. Will administer and continue to monitor.

## 2019-07-03 NOTE — PROVIDER PROGRESS NOTES - EMERGENCY DEPT.
Encounter Date: 7/2/2019    ED Physician Progress Notes       SCRIBE NOTE: I, Jennifer Simon, am scribing for, and in the presence of,  Dr. Artis.  Physician Statement: I, Dr. Artis, personally performed the services described in this documentation as scribed by Jennifer Simon in my presence, and it is both accurate and complete.      EKG - STEMI Decision  Initial Reading: No STEMI present.

## 2019-07-03 NOTE — TREATMENT PLAN
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 One non-bleeding small duodenal ulcer with no stigmata of bleeding. Medium-sized hiatal hernia.    Plan  - Status post EGD (see final note)  - Full liquid diet and advance as tolerated  - Protonix 40mg daily  - Karafate 1gm QID  - F/u esophageal biopsies  - Recommend discussion with Oncology    Thank you for involving us in the care of Jr Marsh. We are signing-off. Please call with any additional questions, concerns or changes in the patient's clinical status.    We will arrange the following follow-up  - Clinic follow-up: patient does not require [facility] follow-up.   - Procedure follow-up: see above plan of care.  - Medication Recommendations: see above plan of care.    [facility] Contact  - Clinic Phone: [phone]  - Procedure Schedulers: [phone]  - Clinic Fax: [phone]      Jarrod Brody MD  Gastroenterology Fellow, PGY4  [facility]

## 2019-07-03 NOTE — HPI
Mr. Jr Marsh is a 52 y/o male with a recent biopsy confirmed diagnosis of adenocarcinoma of the Liver with possible metastasis to the colon diagnosed 3 weeks ago. The patient has been cared for outpatient by his oncologist Dr. Quinonez. The patient reports he is scheduled for a chemo class next Monday and also is to be scheduled for a port placement. The patient presents to the ED last night with the complaint of vomitting that appeared coffee ground in consistancy and later vomitted in the ED confirming coffee ground emesis. This the first day the patient has experienced vomiting but he reports being nauseated over the course of 3 weeks with symptoms worsening with any form of exertion. He reports having sharp pain at his liver biopsy site and and feeling of nausea upon laying down. but denies CP, SOB, light headedness. He reports having B/L LE edema an swelling in his abdomen and diarrhea for the last 2 weeks with no bloody or black stooling.     In the ED the patient was found to have hyperkalemia and was given insulin and D10 250 bolus fluids to try and shift the potassium. This caused his blood glucose to drop to 59 and he became tachycardic. 80 mg IV protonix was given in the ED. He was transferred to the floors for the medicine team to continue his care. GI consults were placed as well.

## 2019-07-03 NOTE — ED PROVIDER NOTES
Encounter Date: 7/2/2019    SCRIBE #1 NOTE: I, Jennifer Simon, am scribing for, and in the presence of,  Dr. Artis. I have scribed the following portions of the note - the EKG reading.       History     Chief Complaint   Patient presents with    Hematemesis     Pt reports 5 episodes today, emesis appears black. Pt c/o RUQ abdominal pain X2 weeks, hx of liver cancer, pt jaundiced. Pt denies increased in pain. Pt has not had any treatments for cancer. Also c/o light headedness, fatigue.      [age] year the ER for evaluation of hematemesis.   I evaluated the patient in the department yesterday.  He left against medical advice to be seen in clinic by his oncologist for his 1st appointment.  Upon chart review they are scheduling the patient for possible port placement later this week and chemotherapy soon thereafter.  The patient presents to the ER tonight with hematemesis x5 episodes, patient reports black appearing emesis.  Upon arrival to the ER he appears ill.  He is jaundiced, and very edematous.  His vital signs though appear to be within normal limits        Review of patient's allergies indicates:  No Known Allergies  Past Medical History:   Diagnosis Date    Cancer      Past Surgical History:   Procedure Laterality Date    KNEE SURGERY      SHOULDER SURGERY       No family history on file.  Social History     Tobacco Use    Smoking status: Former Smoker    Smokeless tobacco: Never Used   Substance Use Topics    Alcohol use: Not Currently     Comment: rarely    Drug use: Never     Review of Systems   Constitutional: Negative for fever.   HENT: Negative for sore throat.    Respiratory: Negative for shortness of breath.    Cardiovascular: Positive for leg swelling. Negative for chest pain.   Gastrointestinal: Positive for nausea and vomiting.   Genitourinary: Negative for dysuria.   Musculoskeletal: Negative for back pain.   Skin: Negative for rash.   Neurological: Negative for weakness.   Hematological:  Does not bruise/bleed easily.       Physical Exam     Initial Vitals [07/02/19 2057]   BP Pulse Resp Temp SpO2   110/65 95 16 97.6 °F (36.4 °C) 96 %      MAP       --         Physical Exam    Constitutional: Vital signs are normal. He appears well-developed and well-nourished. He is not diaphoretic. He appears distressed.   HENT:   Head: Normocephalic and atraumatic.   Eyes: Conjunctivae are normal.   Cardiovascular: Normal rate and regular rhythm.   Diffuse lower extremity edema   Abdominal: Soft. Normal appearance and bowel sounds are normal. There is tenderness.   Abdomen is soft and benign   Musculoskeletal: Normal range of motion.   Neurological: He is alert and oriented to person, place, and time.   Skin: Skin is warm and intact.   Jaundiced    Psychiatric: He has a normal mood and affect. His speech is normal and behavior is normal. Cognition and memory are normal.         ED Course   Critical Care  Date/Time: 7/3/2019 1:29 AM  Performed by: Tony Mckeon PA-C  Authorized by: Leon Artis MD   Direct patient critical care time: 10 minutes  Additional history critical care time: 5 minutes  Ordering / reviewing critical care time: 5 minutes  Documentation critical care time: 10 minutes  Consulting other physicians critical care time: 10 minutes  Total critical care time (exclusive of procedural time) : 40 minutes  Critical care was necessary to treat or prevent imminent or life-threatening deterioration of the following conditions: metabolic crisis.  Critical care was time spent personally by me on the following activities: examination of patient, interpretation of cardiac output measurements, review of old charts, pulse oximetry and re-evaluation of patient's condition.        Labs Reviewed   CBC W/ AUTO DIFFERENTIAL - Abnormal; Notable for the following components:       Result Value    WBC 17.59 (*)     RBC 4.48 (*)     Hemoglobin 9.2 (*)     Hematocrit 32.3 (*)     Mean Corpuscular Volume 72 (*)      Mean Corpuscular Hemoglobin 20.5 (*)     Mean Corpuscular Hemoglobin Conc 28.5 (*)     RDW 17.4 (*)     Platelets 429 (*)     Immature Granulocytes 3.2 (*)     Gran # (ANC) 14.4 (*)     Immature Grans (Abs) 0.57 (*)     Lymph # 0.8 (*)     Mono # 1.8 (*)     nRBC 4 (*)     Gran% 81.9 (*)     Lymph% 4.4 (*)     All other components within normal limits   COMPREHENSIVE METABOLIC PANEL - Abnormal; Notable for the following components:    Sodium 131 (*)     Potassium 5.9 (*)     Chloride 93 (*)     Glucose 69 (*)     BUN, Bld 33 (*)     Calcium 8.5 (*)     Total Protein 5.6 (*)     Albumin 2.2 (*)     Total Bilirubin 4.7 (*)     Alkaline Phosphatase 643 (*)      (*)      (*)     eGFR if non  57.4 (*)     All other components within normal limits   PROTIME-INR - Abnormal; Notable for the following components:    Prothrombin Time 17.3 (*)     INR 1.8 (*)     All other components within normal limits   URINALYSIS, REFLEX TO URINE CULTURE   TYPE & SCREEN     EKG Readings: (Independently Interpreted)   Rhythm: Normal Sinus Rhythm. Heart Rate: 92.   Nonspecific ST abnormalities. No peak T waves.       Imaging Results    None          Medical Decision Making:   History:   Old Medical Records: I decided to obtain old medical records.  Old Records Summarized: other records.       <> Summary of Records: Hospital records  Initial Assessment:   53-year-old male presenting with hematemesis  Differential Diagnosis:   GI bleed, nausea vomiting, Agustina-Sumner tear, PUD  Clinical Tests:   Lab Tests: Ordered and Reviewed  Radiological Study: Ordered and Reviewed  Medical Tests: Ordered and Reviewed  ED Management:  53-year-old male with recent diagnosis of adenocarcinoma of the colon with metastatic disease now with new onset hematemesis tonight.   He has never had a GI bleed before.  Patient was given fluids and Protonix while in the ER.  He has been taking excessive amounts of ibuprofen, this may be the  etiology of his GI bleed.  The case was discussed with Oncology.  The patient is not on active chemotherapy infusion.  They have recommended admission to Hospital Medicine.  GI consult.  And they will see the patient as a consult service in the hospital.   Hemodynamically stable while in the ER, white blood cell count elevated, bilirubin elevated, INR elevated.  Will discuss with Hospital Medicine and anticipate admission, Last episode of coffee-ground hematemesis a@830 p.m. July 2nd  Hyperkalemia, I will give insulin and D10 as we do not have D50,   Case discussed with Hospital Medicine earlier in the night the patient was orthostatic.   He was given a 2nd L of normal saline.   Repeat orthostatic vital signs at 4:00 a.m., patient no longer orthostatic.  He has had persistent nausea and vomiting, he has had more coffee-ground emesis at 4:00 a.m..  Patient given Reglan this time.   Will read discussed with Hospital Medicine  Other:   I discussed test(s) with the performing physician.  I have discussed this case with another health care provider.                      Clinical Impression:       ICD-10-CM ICD-9-CM   1. Hematemesis K92.0 578.0   2. Adenocarcinoma C80.1 199.1         Disposition:   Disposition: Admitted  Condition: Stable                        Tony Mckeon PA-C  07/03/19 2950

## 2019-07-03 NOTE — CONSULTS
"[facility]  Gastroenterology  Consult Note    Patient Name: Jr Marsh  MRN: [id number]  Admission Date: 7/2/2019  Hospital Length of Stay: 0 days  Code Status: Prior   Attending Provider: Colin Ruggiero MD   Consulting Provider: Gokul Naik MD  Primary Care Physician: Primary Doctor No  Principal Problem:Hematemesis    Inpatient consult to Gastroenterology  Consult performed by: Gokul Naik MD  Consult ordered by: Nancy Carrasco DO        Subjective:     HPI:  Jr Marsh is a 53 y.o. male with recently diagnosed adenocarcinoma with mets to liver (unclear primary, small bowel vs distal colon) who presents with hematemesis. Patient originally presented to hospital 7/1 c/o chest pain, but left AMA to attend his oncology clinic appt. He then re-presented yesterday evening (7/2) c/o "dark purple or black" vomiting. He reports 5 bouts of emesis prior to presentation, about two cupfuls. He denies BRB, or ever having blood in emesis or stool before. He has had 4 weeks of abdominal bloating / distension which led to his presentation for his original diagnosis, which has not acutely worsened. He denies fever / chills, chest pain, shortness of breath, lightheadedness / dizziness. He has had watery diarrhea for the past few weeks, with not blood or mucous. He denies taking blood thinners; reports taking advil x 2 a few days prior to presentation for abdominal pain, and no other medications. No family history of colon cancer. He reports having had a colonoscopy Dec 2017 with a benign polyp that was removed.     In the ED, patient was noted to be orthostatic and recevied fluid resuscitation, sat comfortably on room air. Hb 8.8, WBC 17.5, Plt 424, Cr 1.1, BUN 31. He was admitted to hospital medicine for further workup and evaluation. GI was consulted for hematemesis.     Past Medical History:   Diagnosis Date    Cancer     Adenocarcinoma of the liver possibly secondary due to " Mets from the colon       Past Surgical History:   Procedure Laterality Date    KNEE SURGERY      SHOULDER SURGERY         Review of patient's allergies indicates:  No Known Allergies  Family History     None        Tobacco Use    Smoking status: Former Smoker    Smokeless tobacco: Never Used   Substance and Sexual Activity    Alcohol use: Not Currently     Comment: rarely    Drug use: Never    Sexual activity: Not on file     Review of Systems   Constitutional: Negative for chills, fatigue and fever.   Respiratory: Negative for cough, chest tightness and shortness of breath.    Cardiovascular: Negative for chest pain and leg swelling.   Gastrointestinal: Positive for abdominal distention, diarrhea, nausea and vomiting. Negative for abdominal pain, anal bleeding, blood in stool, constipation and rectal pain.   Genitourinary: Negative for difficulty urinating and dysuria.   Musculoskeletal: Negative for back pain and myalgias.   Skin: Positive for color change. Negative for pallor and rash.   Neurological: Negative for dizziness, weakness and light-headedness.   Hematological: Negative for adenopathy.   Psychiatric/Behavioral: Negative for behavioral problems and confusion.     Objective:     Vital Signs (Most Recent):  Temp: 97.4 °F (36.3 °C) (07/03/19 0536)  Pulse: 96 (07/03/19 0801)  Resp: 18 (07/03/19 0536)  BP: 115/64 (07/03/19 0536)  SpO2: 98 % (07/03/19 0536) Vital Signs (24h Range):  Temp:  [97.4 °F (36.3 °C)-97.6 °F (36.4 °C)] 97.4 °F (36.3 °C)  Pulse:  [] 96  Resp:  [12-18] 18  SpO2:  [96 %-100 %] 98 %  BP: (100-129)/(56-86) 115/64     Weight: 80.9 kg (178 lb 5.6 oz) (07/03/19 0536)  Body mass index is 26.34 kg/m².      Intake/Output Summary (Last 24 hours) at 7/3/2019 0842  Last data filed at 7/3/2019 0516  Gross per 24 hour   Intake 2250 ml   Output 450 ml   Net 1800 ml       Lines/Drains/Airways     Peripheral Intravenous Line                 Peripheral IV - Single Lumen 07/02/19 2312 18 G  "Right Antecubital less than 1 day                Physical Exam   Constitutional: He is oriented to person, place, and time. He appears well-developed and well-nourished. No distress.   HENT:   Head: Normocephalic and atraumatic.   Eyes: EOM are normal.   Neck: Normal range of motion. Neck supple.   Cardiovascular: Normal rate, regular rhythm and normal heart sounds. Exam reveals no gallop and no friction rub.   No murmur heard.  Pulmonary/Chest: Effort normal and breath sounds normal. No respiratory distress.   Abdominal: Soft. Bowel sounds are normal. He exhibits distension. There is no tenderness. There is no guarding.   Musculoskeletal: Normal range of motion. He exhibits no edema.   Neurological: He is alert and oriented to person, place, and time.   Skin: Skin is warm and dry. He is not diaphoretic. No erythema. No pallor.       Significant Labs:  CBC:   Recent Labs   Lab 07/01/19  1012 07/02/19  2312   WBC 20.09* 17.59*   HGB 8.8* 9.2*   HCT 30.6* 32.3*   * 429*     BMP:   Recent Labs   Lab 07/01/19  1012  07/03/19  0417   GLU 90   < > 83   *   < > 134*   K 5.9*   < > 5.5*   CL 98   < > 100   CO2 25   < > 19*   BUN 25*   < > 31*   CREATININE 1.1   < > 1.1   CALCIUM 8.3*   < > 7.7*   MG 2.5  --   --     < > = values in this interval not displayed.         Assessment/Plan:     * Hematemesis  53 y.o. male with recently diagnosed adenocarcinoma with mets to liver (unclear primary, small bowel vs distal colon) who presents with hematemesis, "dark purple or black" vomiting, about two cupfuls, denies BRB, or ever having blood in emesis or stool before, has had watery diarrhea for the past few weeks, no AC, OTC NSAID use x 2 (advil). Pos orthostatics in ED, received IVF, now HDS, on RA. Hb 8.8, WBC 17.5, Plt 424, Cr 1.1, BUN 31. GI was consulted for hematemesis.     Rec:  -- EGD today  -- Keep NPO  -- Continue protonix 40 BID  -- Transfuse Hb > 7  -- Please maintain two large bore IV      Thank you for " your consult. I will follow-up with patient. Please contact us if you have any additional questions.    Gokul Naik MD  Gastroenterology  [facility]

## 2019-07-03 NOTE — SUBJECTIVE & OBJECTIVE
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       Past Surgical History:   Procedure Laterality Date    KNEE SURGERY      SHOULDER SURGERY         Review of patient's allergies indicates:  No Known Allergies  Family History     None        Tobacco Use    Smoking status: Former Smoker    Smokeless tobacco: Never Used   Substance and Sexual Activity    Alcohol use: Not Currently     Comment: rarely    Drug use: Never    Sexual activity: Not on file     Review of Systems   Constitutional: Negative for chills, fatigue and fever.   Respiratory: Negative for cough, chest tightness and shortness of breath.    Cardiovascular: Negative for chest pain and leg swelling.   Gastrointestinal: Positive for abdominal distention, diarrhea, nausea and vomiting. Negative for abdominal pain, anal bleeding, blood in stool, constipation and rectal pain.   Genitourinary: Negative for difficulty urinating and dysuria.   Musculoskeletal: Negative for back pain and myalgias.   Skin: Positive for color change. Negative for pallor and rash.   Neurological: Negative for dizziness, weakness and light-headedness.   Hematological: Negative for adenopathy.   Psychiatric/Behavioral: Negative for behavioral problems and confusion.     Objective:     Vital Signs (Most Recent):  Temp: 97.4 °F (36.3 °C) (07/03/19 0536)  Pulse: 96 (07/03/19 0801)  Resp: 18 (07/03/19 0536)  BP: 115/64 (07/03/19 0536)  SpO2: 98 % (07/03/19 0536) Vital Signs (24h Range):  Temp:  [97.4 °F (36.3 °C)-97.6 °F (36.4 °C)] 97.4 °F (36.3 °C)  Pulse:  [] 96  Resp:  [12-18] 18  SpO2:  [96 %-100 %] 98 %  BP: (100-129)/(56-86) 115/64     Weight: 80.9 kg (178 lb 5.6 oz) (07/03/19 0536)  Body mass index is 26.34 kg/m².      Intake/Output Summary (Last 24 hours) at 7/3/2019 0842  Last data filed at 7/3/2019 0516  Gross per 24 hour   Intake 2250 ml   Output 450 ml   Net 1800 ml        Lines/Drains/Airways     Peripheral Intravenous Line                 Peripheral IV - Single Lumen 07/02/19 2312 18 G Right Antecubital less than 1 day                Physical Exam   Constitutional: He is oriented to person, place, and time. He appears well-developed and well-nourished. No distress.   HENT:   Head: Normocephalic and atraumatic.   Eyes: EOM are normal.   Neck: Normal range of motion. Neck supple.   Cardiovascular: Normal rate, regular rhythm and normal heart sounds. Exam reveals no gallop and no friction rub.   No murmur heard.  Pulmonary/Chest: Effort normal and breath sounds normal. No respiratory distress.   Abdominal: Soft. Bowel sounds are normal. He exhibits distension. There is no tenderness. There is no guarding.   Musculoskeletal: Normal range of motion. He exhibits no edema.   Neurological: He is alert and oriented to person, place, and time.   Skin: Skin is warm and dry. He is not diaphoretic. No erythema. No pallor.       Significant Labs:  CBC:   Recent Labs   Lab 07/01/19  1012 07/02/19  2312   WBC 20.09* 17.59*   HGB 8.8* 9.2*   HCT 30.6* 32.3*   * 429*     BMP:   Recent Labs   Lab 07/01/19  1012  07/03/19  0417   GLU 90   < > 83   *   < > 134*   K 5.9*   < > 5.5*   CL 98   < > 100   CO2 25   < > 19*   BUN 25*   < > 31*   CREATININE 1.1   < > 1.1   CALCIUM 8.3*   < > 7.7*   MG 2.5  --   --     < > = values in this interval not displayed.

## 2019-07-03 NOTE — MEDICAL/APP STUDENT
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 The episodes startd last night and continued into the morning each about 2 handfuls of "black/ground coffee/purple". They have been accompanied by severe nausea and abdominal pain. This is the first time this has happened before. He took Advil twice for the abdominal pain but that has been about it. Denies heartburn, weight loss. He has never had an EGD before. His [place] colon[place] for a single polyp found to be benign.     He has been having episodes of bloating, generalized abdominal pain, non-bloody diarrhea fevers, chills and dyspnea for the past 4 weeks.     During his last admission liver bx was done (6/23) to find metastatic cancer consistent with adenocarcinoma of colon vs. Small bowel. No FHx of colon cancer.     SocHx: former smoker w/ 20 py quit 2003  Used to drink heavily until got DUI in 2003 and quit  Works as an artist     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       Past Surgical History:   Procedure Laterality Date    KNEE SURGERY      SHOULDER SURGERY         Review of patient's allergies indicates:  No Known Allergies  Family History     None        Tobacco Use    Smoking status: Former Smoker    Smokeless tobacco: Never Used   Substance and Sexual Activity    Alcohol use: Not Currently     Comment: rarely    Drug use: Never    Sexual activity: Not on file     Review of Systems   Constitutional: " Positive for appetite change, chills, fatigue and fever. Negative for unexpected weight change.   Respiratory: Positive for chest tightness and shortness of breath.    Cardiovascular: Positive for leg swelling. Negative for chest pain and palpitations.   Gastrointestinal: Positive for abdominal distention, abdominal pain, diarrhea, nausea and vomiting. Negative for blood in stool, constipation and rectal pain.   Skin: Positive for color change. Negative for rash.   Neurological: Negative for syncope, light-headedness, numbness and headaches.     Objective:     Vital Signs (Most Recent):  Temp: 97.4 °F (36.3 °C) (07/03/19 0536)  Pulse: 96 (07/03/19 0801)  Resp: 18 (07/03/19 0536)  BP: 115/64 (07/03/19 0536)  SpO2: 98 % (07/03/19 0536) Vital Signs (24h Range):  Temp:  [97.4 °F (36.3 °C)-97.6 °F (36.4 °C)] 97.4 °F (36.3 °C)  Pulse:  [] 96  Resp:  [12-18] 18  SpO2:  [96 %-100 %] 98 %  BP: (100-129)/(56-86) 115/64     Weight: 80.9 kg (178 lb 5.6 oz) (07/03/19 0536)  Body mass index is 26.34 kg/m².      Intake/Output Summary (Last 24 hours) at 7/3/2019 0829  Last data filed at 7/3/2019 0516  Gross per 24 hour   Intake 2250 ml   Output 450 ml   Net 1800 ml       Lines/Drains/Airways     Peripheral Intravenous Line                 Peripheral IV - Single Lumen 07/02/19 2312 18 G Right Antecubital less than 1 day                Physical Exam   Constitutional: He is oriented to person, place, and time. He is cooperative. He has a sickly appearance.   HENT:   Head: Normocephalic and atraumatic.   Mouth/Throat: Oropharynx is clear and moist.   Eyes: Pupils are equal, round, and reactive to light. EOM are normal.   Neck: Normal range of motion. Neck supple.   Cardiovascular: Regular rhythm. Tachycardia present.   Pulmonary/Chest: Effort normal and breath sounds normal. No respiratory distress.   Abdominal: He exhibits distension. He exhibits no mass. There is no tenderness. There is no rebound and no guarding.    Musculoskeletal:   B/l edema of LEs to hip   Lymphadenopathy:     He has no cervical adenopathy.   Neurological: He is alert and oriented to person, place, and time.   Skin: Skin is warm and dry. Capillary refill takes less than 2 seconds.       Significant Labs:    CBC:   Recent Labs   Lab 07/01/19  1012 07/02/19  2312   WBC 20.09* 17.59*   HGB 8.8* 9.2*   HCT 30.6* 32.3*   * 429*     CMP:   Recent Labs   Lab 07/02/19  2312 07/03/19  0417   GLU 69* 83   CALCIUM 8.5* 7.7*   ALBUMIN 2.2*  --    PROT 5.6*  --    * 134*   K 5.9* 5.5*   CO2 23 19*   CL 93* 100   BUN 33* 31*   CREATININE 1.4 1.1   ALKPHOS 643*  --    *  --    *  --    BILITOT 4.7*  --      Coagulation:   Recent Labs   Lab 07/02/19  2312   INR 1.8*       Significant Imaging:  U/S abdomen limited 06/25/2018;     FINDINGS:  There are innumerable lesions present diffusely throughout the hepatic parenchyma.  There is no intra or extrahepatic bile duct dilatation.  The common duct measures 3 mm.    The gallbladder is unremarkable with no evidence of wall thickening, sonographic Borjas's sign, or cholelithiasis.  There is trace pericholecystic fluid.  The gallbladder wall measures 3 mm.    The visualized portions of the pancreas are unremarkable.    The right kidney is unremarkable.    There is no free fluid within the visualized abdomen.      CTA 7/1/2019:  Esophagus: The esophagus is dilated in its thoracic extent.  Material is retained in the mid and distal esophagus.  Large hiatal hernia is present.  These findings are similar to 6/20 February 2019.  Upper Abdomen and diaphragm:    -The liver is enlarged and heterogeneous similar to the observation on CT of the abdomen and pelvis dated 06/20/2019 concerning for numerous metastases or disseminated hepatocellular carcinoma.    -The right hemidiaphragm is mildly elevated due to the enlarged liver.    Thoracic soft tissues: Normal with lean body habitus    Assessment/Plan:     59 yo  man w/ metastatic GI carcinoma of unknown origin here for hematemesis. No previous EGDs done. Based on CTA and patient symptoms concern for hiatal hernia vs esophagitis vs. Agustina jones tear.     - maintain two large bore IVs  - type and cross  - keep NPO for EGD today  - continue protonix IV BID    Active Diagnoses:    Diagnosis Date Noted POA    PRINCIPAL PROBLEM:  Hematemesis [K92.0] 07/03/2019 Yes    Adenocarcinoma determined by biopsy of liver [C22.9] 07/02/2019 Yes    Hyperbilirubinemia [E80.6] 07/02/2019 Yes    Hyperkalemia [E87.5] 07/02/2019 Yes    Hypoalbuminemia [E88.09] 07/02/2019 Yes    Neoplasm related pain [G89.3] 07/02/2019 Yes    Liver metastases [C78.7] 07/02/2019 Yes    Hepatitis [K75.9] 06/25/2019 Yes    Elevated LFTs [R94.5] 06/21/2019 Yes    Leukocytosis [D72.829] 06/21/2019 Yes    Metastatic cancer [C79.9] 06/20/2019 Yes      Problems Resolved During this Admission:       Thank you for your consult. I will follow-up with patient. Please contact us if you have any additional questions.    Maeve Welsh  Gastroenterology  [facility]

## 2019-07-03 NOTE — CONSULTS
"Consult Note    Consults  SUBJECTIVE:     History of Present Illness:  Patient is a 53 y.o. [place] male presents with PMhx of cancer of unknown primary (recently dx on 6/24/19 from core liver bx, favored to be small intestine as likely primary) who presents to the hospital after having episodes of "purple or black" colored vomiting noted yesterday night. We are consulted for assistance in management as patient is well known to me personally from clinic. The pt had one initial episode with larger volume vomitus around 7-8 pm yesterday, followed by up four smaller episodes around that time. The pt had an additional episode of vomiting around 10 pm last night but has not had any problems since that time. He had been taking zofran for control of nausea at home, about once a day but this did not control vomitus yesterday. Also, the pt had not used any NSAIDs in 2 weeks per discussion; he states that he has been compliant with request for refrain from NSAID use at last hospitalization. Patient states that he has been using Morphine 30 BID which have helped him with pain control; pt has not taken any oxycodone since starting Morphine. He still has pain that is worse when he lies on his L side, can't seem to do that for more than 30 minutes. Patient has also started lasix 40 mg at home.    His hemoglobin was noted to be 9.2 when he was in ER.Repeat hemoglobin today  is currently 8.1.  Creatinine was 1.4 on admission from 1.1 previously.  His potassium on admission was 5.9 and trended down to 5.5 with one dose of kayexylate. Pt states that he had started the ursodiol, but his total bilirubin has increased to 4.7 from 3.1 two days ago. INR was 1.8 and plan to administer 10 mg IV vitamin K. An U/S of his abdomen completed on 7/1 did not reveal any biliary ductal obstruction, without any apparent ascites.     Discussed with GI, an EGD is planned to be completed this morning.     Oncologic Hx:  Patient is a 53 " y.o. [place] male presents without any PMHx who presents to the hospital due to abdominal pain with fevers, chills, decreased  appetite, constipation, abdominal distention/pain which started two weeks ago. The pt has been consuming about one meal a day due to severe pain if he continues. He had been having less BM, with about one a day now from three a day previously.The patient has had Gas-X which he he tried over the last week; this weekend he tried dulcolax and miralax and and subsequent loose stools without significant relief. Denies nausea, vomiting; endorses that abdominal pain does worsen with deep breaths. At home, he had taken Advil every 6 - 8 hours for pain relief. Pt has not had any weight loss. Denies any tobacco use. He had been drinking in his 20s-30s, about a 6 pack of beer on weekends; pt denies any significant drinking since 2003. He does not have any personal or family hx of cancer. The pt lives in [facility], works as video .       He had a colonoscopy in [place], [place], which was apparently negative at the time. Patient has friends here for good support but states that all of his family is back in [place].     MRI of abdomen on 6/22/19 markedly enlarged liver with inummerable hepatic lesions, with largest mass in R hepatic lobe of 7.4 cm and L hepatic lobe 6.1 cm. Also mariam hepatis adenopathy conglomerate noted to 3.6 cm.   Initial CT chest negative for pulmonary disease and repeat CTA chest, completed recently on 7/1, does not reveal a PE or any metastatic disease.     We had discussed getting a port placed within the next week and likely starting with FOLFOX soon.       Review of patient's allergies indicates:  No Known Allergies  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     Past Surgical History:   Procedure Laterality Date    KNEE SURGERY      SHOULDER SURGERY       History reviewed. No  pertinent family history.  Social History     Tobacco Use    Smoking status: Former Smoker    Smokeless tobacco: Never Used   Substance Use Topics    Alcohol use: Not Currently     Comment: rarely    Drug use: Never     Review of Systems   Constitutional: Positive for malaise/fatigue. Negative for chills, fever and weight loss.   Respiratory: Negative for cough and shortness of breath.    Cardiovascular: Positive for leg swelling. Negative for chest pain.   Gastrointestinal: Positive for abdominal pain, nausea and vomiting. Negative for blood in stool and melena.   Musculoskeletal: Negative for falls.   Skin: Negative for rash.   Neurological: Negative for focal weakness, seizures, loss of consciousness and headaches.     OBJECTIVE:     Vital Signs:  Temp:  [97.4 °F (36.3 °C)-97.5 °F (36.4 °C)]   Pulse:  []   Resp:  [12-18]   BP: ()/(53-86)   SpO2:  [96 %-100 %]     Physical Exam   Constitutional: He is oriented to person, place, and time. He appears well-developed and well-nourished. No distress.   HENT:   Head: Normocephalic and atraumatic.   Eyes: Pupils are equal, round, and reactive to light. EOM are normal. Scleral icterus is present.   Neck: Normal range of motion. Neck supple.   Cardiovascular: Normal rate, regular rhythm and normal heart sounds. Exam reveals no gallop and no friction rub.   No murmur heard.  Pulmonary/Chest: Effort normal and breath sounds normal. No respiratory distress. He has no wheezes. He has no rales.   Abdominal: Soft. Bowel sounds are normal. He exhibits distension. There is no tenderness. There is no guarding.   Musculoskeletal: Normal range of motion.   3+ pitting edema in bilateral lower extremities   Lymphadenopathy:     He has no cervical adenopathy.   Neurological: He is alert and oriented to person, place, and time. No cranial nerve deficit.   Skin: Skin is warm and dry.   jaundiced     Laboratory:  CBC:   Recent Labs   Lab 07/02/19  2312   WBC 17.59*   RBC  4.48*   HGB 9.2*   HCT 32.3*   *   MCV 72*   MCH 20.5*   MCHC 28.5*     BMP:   Recent Labs   Lab 07/01/19  1012  07/03/19  0417   GLU 90   < > 83   *   < > 134*   K 5.9*   < > 5.5*   CL 98   < > 100   CO2 25   < > 19*   BUN 25*   < > 31*   CREATININE 1.1   < > 1.1   CALCIUM 8.3*   < > 7.7*   MG 2.5  --   --     < > = values in this interval not displayed.     CMP:   Recent Labs   Lab 07/02/19  2312 07/03/19  0417   GLU 69* 83   CALCIUM 8.5* 7.7*   ALBUMIN 2.2*  --    PROT 5.6*  --    * 134*   K 5.9* 5.5*   CO2 23 19*   CL 93* 100   BUN 33* 31*   CREATININE 1.4 1.1   ALKPHOS 643*  --    *  --    *  --    BILITOT 4.7*  --      LFTs:   Recent Labs   Lab 07/02/19  2312   *   *   ALKPHOS 643*   BILITOT 4.7*   PROT 5.6*   ALBUMIN 2.2*     Coagulation:   Recent Labs   Lab 07/02/19 2312   LABPROT 17.3*   INR 1.8*     Cardiac markers:   Recent Labs   Lab 07/01/19  1230   TROPONINI <0.006     ABGs: No results for input(s): PH, PCO2, PO2, HCO3, POCSATURATED, BE in the last 168 hours.  Microbiology Results (last 7 days)     Procedure Component Value Units Date/Time    Blood culture [[id number]] Collected:  07/03/19 0934    Order Status:  Sent Specimen:  Blood Updated:  07/03/19 0940    Blood culture [[id number]] Collected:  07/03/19 0934    Order Status:  Sent Specimen:  Blood Updated:  07/03/19 0940        Specimen (12h ago, onward)    None        Recent Labs   Lab 07/03/19  0516   COLORU Nancy   SPECGRAV 1.025   PHUR 5.0   PROTEINUA Negative   NITRITE Negative   LEUKOCYTESUR Negative       Diagnostic Results:  EXAMINATION:  US ABDOMEN LIMITED    CLINICAL HISTORY:  Upper abdominal pain, unspecified    TECHNIQUE:  Limited ultrasound of the right upper quadrant of the abdomen (including pancreas, liver, gallbladder, common bile duct, and right kidney) was performed.    COMPARISON:  U/S abdomen limited 06/25/2018; CTA chest non-coronary PE study 07/01/2019    FINDINGS:  There are  innumerable lesions present diffusely throughout the hepatic parenchyma.  There is no intra or extrahepatic bile duct dilatation.  The common duct measures 3 mm.    The gallbladder is unremarkable with no evidence of wall thickening, sonographic Borjas's sign, or cholelithiasis.  There is trace pericholecystic fluid.  The gallbladder wall measures 3 mm.    The visualized portions of the pancreas are unremarkable.    The right kidney is unremarkable.    There is no free fluid within the visualized abdomen.      Impression       Innumerable liver lesions.    No evidence of acute cholecystitis.    No significant change from prior ultrasound examination.    Electronically signed by resident: Kurtis Li  Date: 07/01/2019  Time: 14:50    Electronically signed by: Ronald Diana MD  Date: 07/01/2019  Time: 15:30         ASSESSMENT/PLAN:     Plan:    Hematemesis  -  hemoglobin was noted to be 9.2 when he was in ER; repeat hemoglobin today  is currently 8.1  - Protonix IV 80 mg initially and then 40 mg IV BID  - Discussed with GI and plan for endoscopy today  - will follow-up with any biopsies, appreciate GI's help with case     Metastatic Cancer, liver mets with likely small bowel primary   - further imaging to look for SB primary not completed due to need start therapy soon in light of quick progression of liver disease and need to get control of disease soon  - planning to treat with FOLFOX q2 weeks  - MRI of abdomen on 6/22/19 markedly enlarged liver with inummerable hepatic lesions, with largest mass in R hepatic lobe of 7.4 cm and L hepatic lobe 6.1 cm. Also mariam hepatis adenopathy conglomerate noted to 3.6 cm.   - Initial CT chest negative for pulmonary disease and repeat CTA chest, completed recently on 7/1, does not reveal a PE or any metastatic disease.   - pt will need port placement soon and awaiting approval of chemotherapy  - cannot start this regimen inpatient and will need him to be assessed recover  first from possible GI bleed  - If he is deemed safe to start chemotherapy post EGD, we will plan to start FOLFOX next week    Hyperbilirubinemia  - ursodiol 300 mg BID started recently  - CBD of 3 mm per U/S abdomen on 7/1    Coagulopathy  - INR of 1.8 initially, likely secondary to liver disease  - agree with one dose of 10 mg IV Vitamin K prior to GI procedure    Continue current management of neoplasm related pain with Morphine 30 mg BID and also peripheral edema (secondary to hypoalbuminemia) with 40 mg lasix, can consider pareneteral with current GI bleed eval.      Discussed with Dr. Thong Thompson MD  Hematology/Oncology Fellow, PGY IV  [facility]

## 2019-07-03 NOTE — ED NOTES
Notified KEYONA Monroy about positive orthostatic vital signs. Will give patient a second liter of NS.

## 2019-07-03 NOTE — ANESTHESIA POSTPROCEDURE EVALUATION
Anesthesia Post Evaluation    Patient: Jr Marsh    Procedure(s) Performed: Procedure(s) (LRB):  EGD (ESOPHAGOGASTRODUODENOSCOPY) (N/A)    Final Anesthesia Type: general  Patient location during evaluation: PACU  Patient participation: Yes- Able to Participate  Level of consciousness: awake and alert  Post-procedure vital signs: reviewed and stable  Pain management: adequate  Airway patency: patent  PONV status at discharge: No PONV  Anesthetic complications: yes  Perioperative Events: other periop events (see comments)  Cayla-operative Events Comments: Hypoglycemia treated with D10  Cardiovascular status: blood pressure returned to baseline  Respiratory status: spontaneous ventilation and room air  Hydration status: euvolemic  Follow-up not needed.          Vitals Value Taken Time   /61 7/3/2019  3:16 PM   Temp 37.2 °C (99 °F) 7/3/2019  3:05 PM   Pulse 92 7/3/2019  3:26 PM   Resp 16 7/3/2019  3:15 PM   SpO2 98 % 7/3/2019  3:26 PM   Vitals shown include unvalidated device data.      No case tracking events are documented in the log.      Pain/Tana Score: Pain Rating Prior to Med Admin: 0 (7/3/2019  8:46 AM)

## 2019-07-04 PROBLEM — E16.2 HYPOGLYCEMIA: Status: ACTIVE | Noted: 2019-01-01

## 2019-07-04 PROBLEM — E79.0 HYPERURICEMIA: Status: ACTIVE | Noted: 2019-01-01

## 2019-07-04 PROBLEM — R57.9 SHOCK: Status: ACTIVE | Noted: 2019-01-01

## 2019-07-04 PROBLEM — N17.9 AKI (ACUTE KIDNEY INJURY): Status: ACTIVE | Noted: 2019-01-01

## 2019-07-04 PROBLEM — Z99.11 ON MECHANICALLY ASSISTED VENTILATION: Status: ACTIVE | Noted: 2019-01-01

## 2019-07-04 PROBLEM — J96.01 ACUTE HYPOXEMIC RESPIRATORY FAILURE: Status: ACTIVE | Noted: 2019-01-01

## 2019-07-04 PROBLEM — R74.8 ELEVATED LIVER ENZYMES: Status: ACTIVE | Noted: 2019-01-01

## 2019-07-04 PROBLEM — G93.41 ACUTE METABOLIC ENCEPHALOPATHY: Status: ACTIVE | Noted: 2019-01-01

## 2019-07-04 NOTE — PROGRESS NOTES
Pharmacokinetic Initial Assessment: IV Vancomycin    Assessment/Plan:    Initiate intravenous vancomycin with loading dose of 1750 mg once.   Desired empiric serum trough concentration is 15 to 20 mcg/mL.  Draw random vancomycin level tomorrow 7/5 with morning labs.   Pharmacy will continue to follow and monitor vancomycin.      Please contact pharmacy at extension [phone] with any questions regarding this assessment.     Thank you for the consult,   Amna Gregory, PharmD, [facility]     Patient brief summary:  Jr Marsh is a 53 y.o. male initiated on antimicrobial therapy with IV Vancomycin for treatment of suspected sepsis.     Drug Allergies:   Review of patient's allergies indicates:  No Known Allergies    Actual Body Weight:   80.9 kg    Renal Function:   Estimated Creatinine Clearance: 37.6 mL/min (A) (based on SCr of 2.3 mg/dL (H)).,     Dialysis Method (if applicable):  Not applicable     CBC (last 72 hours):  Recent Labs   Lab Result Units 07/01/19  1012 07/02/19  2312 07/03/19  1144 07/03/19  1754 07/04/19  0024 07/04/19  0544 07/04/19  0802   WBC K/uL 20.09* 17.59* 18.67* 21.45* 26.41* 30.41* 28.39*   Hemoglobin g/dL 8.8* 9.2* 8.1* 8.9* 8.6* 8.2* 7.0*   Hematocrit % 30.6* 32.3* 28.5* 31.1* 31.0* 30.3* 26.9*   Platelets K/uL 420* 429* 371* 418* 388* 329 261   Gran% % 79.7* 81.9* 79.9* 78.1* 82.7* 85.0* 84.9*   Lymph% % 5.7* 4.4* 5.7* 4.9* 2.9* 1.9* 2.6*   Mono% % 11.9 10.2 10.9 12.1 9.5 8.7 8.4   Eosinophil% % 0.1 0.1 0.1 0.0 0.0 0.0 0.0   Basophil% % 0.2 0.2 0.2 0.2 0.2 0.2 0.1   Differential Method  Automated Automated Automated Automated Automated Automated Automated       Metabolic Panel (last 72 hours):  Recent Labs   Lab Result Units 07/01/19  1012 07/02/19  2312 07/03/19  0417 07/03/19  0516 07/04/19  0802 07/04/19  0812   Sodium mmol/L 133* 131* 134*  --  132* 137   Potassium mmol/L 5.9* 5.9* 5.5*  --  8.2* 6.7*   Chloride mmol/L 98 93* 100  --  101 107   CO2 mmol/L 25 23 19*  --  6* 7*   Glucose  mg/dL 90 69* 83  --  35* 37*   Glucose, UA   --   --   --  Negative  --   --    BUN, Bld mg/dL 25* 33* 31*  --  48* 45*   Creatinine mg/dL 1.1 1.4 1.1  --  2.7* 2.3*   Albumin g/dL 2.2* 2.2*  --   --   --  1.5*   Total Bilirubin mg/dL 3.1* 4.7*  --   --   --  5.2*   Alkaline Phosphatase U/L 597* 643*  --   --   --  456*   AST U/L 743* 773*  --   --   --  3,860*   ALT U/L 488* 504*  --   --   --  1,158*   Magnesium mg/dL 2.5  --   --   --   --   --    Phosphorus mg/dL 2.2*  --   --   --   --   --        Drug levels (last 3 results):  No results for input(s): VANCOMYCINRA, VANCOMYCINPE, VANCOMYCINTR in the last 72 hours.    Microbiologic Results:  Microbiology Results (last 7 days)     Procedure Component Value Units Date/Time    Blood culture [[id number]] Collected:  07/03/19 0934    Order Status:  Completed Specimen:  Blood Updated:  07/03/19 1715     Blood Culture, Routine No Growth to date    Blood culture [[id number]] Collected:  07/03/19 0934    Order Status:  Completed Specimen:  Blood Updated:  07/03/19 1715     Blood Culture, Routine No Growth to date

## 2019-07-04 NOTE — CARE UPDATE
Nephrology Care Update  Patient's K continues elevated, Uric acid high, Calcium low, Phos high, in favor of TLS.    Plan:  - Will start SLED for metabolic clearance, no UF.  - Consider rasburicase for TLS    Jose Gan MD  Nephrology  [facility]  [phone]

## 2019-07-04 NOTE — PROCEDURES
"Jr Marsh is a 53 y.o. male patient.    Temp: (!) 88.2 °F (31.2 °C) (07/04/19 1838)  Pulse: 63 (07/04/19 1838)  Resp: (!) 26 (07/04/19 1838)  BP: (!) 89/55 (07/04/19 1800)  SpO2: 100 % (07/04/19 1838)  Weight: 80.9 kg (178 lb 5.6 oz) (07/03/19 0536)  Height: 5' 9" (175.3 cm) (07/02/19 2057)       Central Line  Date/Time: 7/4/2019 9:00 AM  Location procedure was performed: [facility] ([facility])  Performed by: Fiona Winterbottom, APRN, CNS  Pre-operative Diagnosis: shock  Post-operative diagnosis: shock  Consent Done: Emergent Situation  Time out: Immediately prior to procedure a "time out" was called to verify the correct patient, procedure, equipment, support staff and site/side marked as required.  Indications: hemodialysis and vascular access  Anesthesia: local infiltration    Anesthesia:  Local Anesthetic: lidocaine 1% without epinephrine  Anesthetic total: 5 mL  Preparation: skin prepped with ChloraPrep  Location details: right internal jugular  Catheter type: trialysis  Catheter size: 12 Fr  Catheter Length: 15cm    Ultrasound guidance: yes  Vessel Caliber: large, patent, compressibility normal  Needle advanced into vessel with real time Ultrasound guidance.  Guidewire confirmed in vessel.  Sterile sheath used.  Manometry: Yes  Number of attempts: 1  Assessment: placement verified by x-ray and no pneumothorax on x-ray  Complications: none  Post-procedure: line sutured,  chlorhexidine patch,  sterile dressing applied and blood return through all ports  Complications: No          Fiona Winterbottom  7/4/2019  "

## 2019-07-04 NOTE — EICU
08:13 Called into room for emergent intubation. BP 76.39, HR 64, SP02 99%. Dr Villegas preparing to intubate with Dr Vann supervising  08:15 100 mcg neosynephrine IVP BP 90/47 HR64, SP02 95%  08:19 50 mcg fentanyl IVP  08:22 100 mcg fatuma given IVP BP 78/37, HR62, SP02 100%  08:23 Etomidate and rocuronium per anesthesia IVP  08:24 100mcg fatuma IVP 78/37  08:27 8.0 ETT at 24cms at the lip BBS and positive color change.  08:20 Preparing to insert central line BP 86/44, HR 65, SP02 100%

## 2019-07-04 NOTE — SUBJECTIVE & OBJECTIVE
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       Past Surgical History:   Procedure Laterality Date    KNEE SURGERY      SHOULDER SURGERY         Review of patient's allergies indicates:  No Known Allergies  Current Facility-Administered Medications   Medication Frequency    calcium gluconate 1g in dextrose 5% 100mL (ready to mix system) Q10 Min PRN    chlorhexidine 0.12 % solution 15 mL BID    dextrose 10 % infusion Continuous    dextrose 10% (D10W) Bolus PRN    dextrose 10% (D10W) Bolus PRN    dextrose 10% (D10W) Bolus PRN    fentaNYL injection 50 mcg Q15 Min PRN    Followed by    [START ON 7/6/2019] fentaNYL injection 50 mcg Q1H PRN    metoclopramide HCl tablet 10 mg Q6H PRN    norepinephrine 4 mg in dextrose 5% 250 mL infusion (premix) (titrating) Continuous    norepinephrine bitartrate-D5W 4 mg/250 mL (16 mcg/mL) infusion Soln     ondansetron disintegrating tablet 8 mg Q12H PRN    ondansetron injection 4 mg Q6H PRN    pantoprazole injection 40 mg BID    phenylephrine HCl in 0.9% NaCl 1 mg/10 mL (100 mcg/mL) syringe 100 mcg PRN    piperacillin-tazobactam 4.5 g in sodium chloride 0.9% 100 mL IVPB (ready to mix system) Q8H    propofol (DIPRIVAN) 10 mg/mL infusion     propofol (DIPRIVAN) 10 mg/mL infusion Continuous    sodium bicarbonate 1 mEq/mL (8.4 %) 150 mEq in dextrose 5 % 1,000 mL infusion Continuous    sodium bicarbonate 4% injection 100 mEq Once    sucralfate 100 mg/mL suspension 1 g Q6H    ursodiol capsule 300 mg BID    vancomycin 1.5 g in dextrose 5 % 250 mL IVPB (ready to mix) Once     Family History     None        Tobacco Use    Smoking status: Former Smoker    Smokeless tobacco: Never Used   Substance and Sexual Activity    Alcohol use: Not Currently     Comment: rarely    Drug use: Never    Sexual activity: Not on file     Review of Systems   Unable to perform ROS: Intubated      Objective:     Vital Signs (Most Recent):  Temp: 97.5 °F (36.4 °C) (07/04/19 0458)  Pulse: 66 (07/04/19 0939)  Resp: (!) 26 (07/04/19 0939)  BP: (!) 99/52 (07/04/19 0939)  SpO2: 100 % (07/04/19 0939)  O2 Device (Oxygen Therapy): ventilator (07/04/19 0939) Vital Signs (24h Range):  Temp:  [97.5 °F (36.4 °C)-99 °F (37.2 °C)] 97.5 °F (36.4 °C)  Pulse:  [] 66  Resp:  [16-30] 26  SpO2:  [97 %-100 %] 100 %  BP: ()/(34-71) 99/52     Weight: 80.9 kg (178 lb 5.6 oz) (07/03/19 0536)  Body mass index is 26.34 kg/m².  Body surface area is 1.98 meters squared.    I/O last 3 completed shifts:  In: 2980 [P.O.:730; IV Piggyback:2250]  Out: 450 [Urine:450]    Physical Exam   Constitutional: He appears cachectic. He is cooperative. He has a sickly appearance. He appears ill.   Critically ill, sedated on mechanical ventilation   Eyes: Conjunctivae and EOM are normal.   Neck: Normal range of motion. Neck supple.   Cardiovascular: Normal rate, regular rhythm and normal heart sounds.   Pulmonary/Chest: Effort normal and breath sounds normal. No stridor. No respiratory distress. He has no wheezes. He has no rales.   Vent transmitted breath sounds   Abdominal: He exhibits distension and ascites. Bowel sounds are decreased. There is hepatomegaly. There is tenderness.   Musculoskeletal: Normal range of motion. He exhibits edema. He exhibits no tenderness.   Neurological: He is alert.   Skin: Skin is warm and dry.   Psychiatric: He has a normal mood and affect. His behavior is normal. Judgment and thought content normal.   Nursing note and vitals reviewed.      Significant Labs:  CBC:   Recent Labs   Lab 07/04/19 0802   WBC 28.39*   RBC 3.36*   HGB 7.0*   HCT 26.9*      MCV 80*   MCH 20.8*   MCHC 26.0*     CMP:   Recent Labs   Lab 07/04/19  0812   GLU 37*   CALCIUM 7.3*   ALBUMIN 1.5*   PROT 3.4*      K 6.7*   CO2 7*      BUN 45*   CREATININE 2.3*   ALKPHOS 456*   ALT 1,158*   AST 3,860*   BILITOT 5.2*      All labs within the past 24 hours have been reviewed.    Significant Imaging:  CXR personally reviewed.

## 2019-07-04 NOTE — ASSESSMENT & PLAN NOTE
Patient with acute kidney injury likely from ischemic ATN from hypotension, drops in Hb and hemodynamic instability vs TLS    Plan:  - Bicarb 100 mEq IV push (bicarb deficit ~450 mEq)  - Bicarb drip 150 mEq/D5W1L to run at 150 mL/hr  - Furosemide  mg IV x 1 dose to help with hyperkalemia   - Renal US  - Strict I/Os and chart  - Renal function panel q 4 hrs to monitor K and kidney function  - MAP > 65  - Hb > 7 gm/dL  - Avoid kayaxelate in patient with intraabdominal GI cancer lesions  - Will follow closely

## 2019-07-04 NOTE — ASSESSMENT & PLAN NOTE
BELINDA - sCr up to 2.7 from baseline 0.9-1.1  Nephrology consulted- appreciate recs  Trialysis placed  - Bicarb 100 mEq IVP then Bicarb drip 150 mEq/D5W1L to run at 150 mL/hr  - Furosemide 80mg IV x 1 dose to help with hyperkalemia   - Renal US/CT abdomen  - Strict I/Os and chart  - Trend BMP q 4 hrs to monitor K and renal function  - MAP > 65  - Hb > 7 gm/dL  - Avoid kayaxelate in patient with intraabdominal GI cancer lesions  - CRRT/SLED

## 2019-07-04 NOTE — H&P
"[facility]  Critical Care Medicine  History & Physical    Patient Name: Jr Marsh  MRN: [id number]  Admission Date: 7/2/2019  Hospital Length of Stay: 1 days  Code Status: Prior  Attending Physician: Ronald Weaver MD   Primary Care Provider: Primary Doctor No   Principal Problem: Shock    Subjective:     HPI:  Mr. Jr Marsh is a 54 y/o male who [place] [facility] [place] with a two week history of abdominal pain, constipation, and bloating. He was found to have hepatic lesions on a CT scan concerning for HCC. On 6/24 a liver biopsy confirmed adenocarcinoma with possible colonic primary. There were plans for a prot placement and chemotherapy. On 7/2, the patient presented to ED after 5 episodes of "black' emesis and RUQ pain. He was admitted to the Oncology floor. 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 On the early morning of  7/4, the patient became hypotensive and was transferred to ICU with undifferentiated shock. He had a metabolic acidosis, lactate >12, leukocytosis, hyperkalemia, hypoglycemia, and BELINDA.    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       Past Surgical History:   Procedure Laterality Date    KNEE SURGERY      SHOULDER SURGERY         Review of patient's allergies indicates:  No Known Allergies    Family History     None        Tobacco Use    Smoking status: Former Smoker    Smokeless tobacco: Never Used   Substance and Sexual Activity    Alcohol use: Not Currently     Comment: rarely    Drug use: Never    Sexual activity: Not on file      Review of Systems   Unable to perform ROS: Mental status change     Objective:     Vital Signs (Most " Recent):  Temp: (!) 87.9 °F (31.1 °C)(christine reyes NP notified, applying warming blanket) (07/04/19 1300)  Pulse: (!) 58 (07/04/19 1700)  Resp: (!) 26 (07/04/19 1700)  BP: (!) 88/52 (07/04/19 1700)  SpO2: 97 % (07/04/19 1700) Vital Signs (24h Range):  Temp:  [87.9 °F (31.1 °C)-98 °F (36.7 °C)] 87.9 °F (31.1 °C)  Pulse:  [54-99] 58  Resp:  [18-30] 26  SpO2:  [95 %-100 %] 97 %  BP: ()/(34-65) 88/52   Weight: 80.9 kg (178 lb 5.6 oz)  Body mass index is 26.34 kg/m².      Intake/Output Summary (Last 24 hours) at 7/4/2019 1719  Last data filed at 7/4/2019 1700  Gross per 24 hour   Intake 3791.17 ml   Output 428 ml   Net 3363.17 ml       Physical Exam   Constitutional: He appears well-developed. He appears lethargic. He appears ill.   HENT:   Head: Normocephalic.   Eyes:   Eyes rolled back, unable to see pupils   Cardiovascular: Normal rate and intact distal pulses.   Pulmonary/Chest: Tachypnea noted. He has decreased breath sounds.   Abdominal: He exhibits distension.   Abdomen very firm and distended   Neurological: He appears lethargic. GCS eye subscore is 1. GCS verbal subscore is 2. GCS motor subscore is 4.   Skin: Skin is warm and dry.   Nursing note and vitals reviewed.      Vents:  Vent Mode: A/C (07/04/19 1339)  Ventilator Initiated: Yes (07/04/19 0831)  Set Rate: 26 bmp (07/04/19 1339)  Vt Set: 350 mL (07/04/19 1339)  Pressure Support: 0 cmH20 (07/04/19 1339)  PEEP/CPAP: 5 cmH20 (07/04/19 1339)  Oxygen Concentration (%): 60 (07/04/19 1700)  Peak Airway Pressure: 27 cmH2O (07/04/19 1339)  Plateau Pressure: 0 cmH20 (07/04/19 1339)  Total Ve: 9.43 mL (07/04/19 1339)  F/VT Ratio<105 (RSBI): (!) 71.82 (07/04/19 1339)  Lines/Drains/Airways     Central Venous Catheter Line                 Trialysis (Dialysis) Catheter 07/04/19 0907 right internal jugular less than 1 day          Drain                 Urethral Catheter 07/04/19 0835 Latex 16 Fr. less than 1 day          Airway                 Airway -  "Non-Surgical 07/04/19 0825 Endotracheal Tube less than 1 day          Peripheral Intravenous Line                 Peripheral IV - Single Lumen 07/04/19 0600 20 G Left Forearm less than 1 day         Peripheral IV - Single Lumen 07/04/19 0600 20 G Right Forearm less than 1 day         Peripheral IV - Single Lumen 07/04/19 1543 22 G Right Hand less than 1 day              Significant Labs:    CBC/Anemia Profile:  Recent Labs   Lab 07/04/19  0802 07/04/19  1209 07/04/19  1454   WBC 28.39* 21.95* 8.65   HGB 7.0* 6.9* 6.9*   HCT 26.9* 25.5* 26.5*    202 189   MCV 80* 78* 80*   RDW 17.5* 17.2* 17.3*        Chemistries:  Recent Labs   Lab 07/02/19  2312  07/04/19  0812 07/04/19  0951 07/04/19  1209   *   < > 137 131* 127*   K 5.9*   < > 6.7* 7.2* 6.9*   CL 93*   < > 107 100 96   CO2 23   < > 7* 7* 10*   BUN 33*   < > 45* 48* 46*   CREATININE 1.4   < > 2.3* 2.5* 2.5*   CALCIUM 8.5*   < > 7.3* 8.4* 7.7*   ALBUMIN 2.2*  --  1.5*  --  1.5*  1.5*   PROT 5.6*  --  3.4*  --  3.6*   BILITOT 4.7*  --  5.2*  --  5.8*   ALKPHOS 643*  --  456*  --  519*   *  --  1,158*  --  1,482*   *  --  3,860*  --  5,421*   MG  --   --  3.2*  --   --    PHOS  --   --  8.2*  --  8.2*    < > = values in this interval not displayed.       All pertinent labs within the past 24 hours have been reviewed.    Significant Imaging: I have reviewed and interpreted all pertinent imaging results/findings within the past 24 hours.    Assessment/Plan:     Neuro  Acute metabolic encephalopathy  Patient increasing altered since 7/3 likely due to worsening acidosis  -- Answers "yes" to all questions and doesn't open his eyes  -- agitated prior to intubation, therefore sedated with Fentanyl and Propofol  -- Sedation off after CT scan in pm of 7/4 however patient remains minimally responsive  -- Neuro checks at least Q4  -- Will continue to monitor and consider CTH and EEG if condition does not improve        Pulmonary  Acute hypoxemic " respiratory failure  -- Intubated 7/4  -- Sedated for prevention of line removal  -- sedation weaned for RASS of zero  -- ABG prn  -- Wean ventilator as tolerated for SaO2 >92%  -- Daily SAT/SBT    On mechanically assisted ventilation  Intubated for airway protection and work of breathing needed to compensate for metabolic acidosis  -- See respiratory failure    Renal/  BELINDA (acute kidney injury)  BELINDA - sCr up to 2.7 from baseline 0.9-1.1  Nephrology consulted- appreciate recs  Trialysis placed  - Bicarb 100 mEq IVP then Bicarb drip 150 mEq/D5W1L to run at 150 mL/hr  - Furosemide 80mg IV x 1 dose to help with hyperkalemia   - Renal US/CT abdomen  - Strict I/Os and chart  - Trend BMP q 4 hrs to monitor K and renal function  - MAP > 65  - Hb > 7 gm/dL  - Avoid kayaxelate in patient with intraabdominal GI cancer lesions  - CRRT/SLED    Acute hyperkalemia  Patient hyperkalemic on admission  -- Shifted with Insulin and dextrose several times  -- EKG  -- Lasix 80 mg x1  -- Trialysis placed for CRRT   -- Trend potasium q4      Oncology  Anemia due to blood loss, acute  Unclear etiology of blood loss  -- No Ng/OG due to concern for bleeding of esophageal mass seen on EGD  -- 1 unit PRBCs today  -- trend CBC  -- transfuse for hgb<7      Neoplasm related pain  On Fentanyl initially  - now not waking up.  -- Will provide pain relief as needed    Liver metastases  See note above-Adenocarcinoma    Adenocarcinoma  6/20/2019- CT ab/pelv is concern for metastatic HCC vs cholangiocarcinoma   -- Tumor markers- AFP of 974, CA 19-9 > 27682, CEA of 158.3 on that admission  - C-scope l2018 negative for malignancy (per patient report to Hospital Medicine team)  - IR liver biopsy done on 6/24, path showing adenocarcinoma ( possibly of colonic origin)  - LFTs worsening  - Discussed with Oncology. No active treatment at this time. 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 One non-bleeding small duodenal ulcer with no stigmata of bleeding. Medium-sized hiatal hernia.  -- will continue to monitor    Orthopedic  Hyperuricemia  Uric acid elevated up to 15.3  -- no indication for Rasburicase per Oncology  -- continue to monitor    Other  * Shock  Undifferentiated  -- treating as septic shock with abx  -- PRBCs for anemia  -- ScVO2 ~70  -- Lactate >12  -- 2D echo ordered  -- Levophed for MAP>65  -- patient at high risk for deterioration          Critical Care Daily Checklist:    A: Awake: RASS Goal/Actual Goal: RASS Goal: 0-->alert and calm  Actual: Davis Agitation Sedation Scale (RASS): Light sedation   B: Spontaneous Breathing Trial Performed?     C: SAT & SBT Coordinated?  NA                      D: Delirium: CAM-ICU Overall CAM-ICU: Positive   E: Early Mobility Performed? No   F: Feeding Goal:    Status:     Current Diet Order   Procedures    Diet NPO      AS: Analgesia/Sedation Fentanyl   T: Thromboembolic Prophylaxis SCds   H: HOB > 300 Yes   U: Stress Ulcer Prophylaxis (if needed) Protonix   G: Glucose Control Q1hr   B: Bowel Function Stool Occurrence: 0   I: Indwelling Catheter (Lines & Jaime) Necessity Trialysis #1  Jaime #1   D: De-escalation of Antimicrobials/Pharmacotherapies Continue    Plan for the day/ETD Supportive care    Code Status:  Family/Goals of Care: Full  Family on route to the hospital. Will need goals of care conversation  tonight when they arrive     Critical Care Time: 120 minutes  Critical secondary to Patient has a condition that poses threat to life and bodily function: shock     Critical care was time spent personally by me on the following activities: development of treatment plan with patient or surrogate and bedside caregivers, discussions with consultants, evaluation of patient's response to treatment, examination of patient, ordering and performing treatments and interventions, ordering and review of laboratory studies, ordering and review of radiographic studies, pulse oximetry, re-evaluation of patient's condition. This critical care time did not overlap with that of any other provider or involve time for any procedures.     Fiona Winterbottom, APRN, CNS  Critical Care Medicine  [facility]

## 2019-07-04 NOTE — ASSESSMENT & PLAN NOTE
Undifferentiated  -- treating as septic shock with abx  -- PRBCs for anemia  -- ScVO2 ~70  -- Lactate >12  -- 2D echo ordered  -- Levophed for MAP>65  -- patient at high risk for deterioration

## 2019-07-04 NOTE — SUBJECTIVE & OBJECTIVE
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       Past Surgical History:   Procedure Laterality Date    KNEE SURGERY      SHOULDER SURGERY         Review of patient's allergies indicates:  No Known Allergies    Family History     None        Tobacco Use    Smoking status: Former Smoker    Smokeless tobacco: Never Used   Substance and Sexual Activity    Alcohol use: Not Currently     Comment: rarely    Drug use: Never    Sexual activity: Not on file      Review of Systems   Unable to perform ROS: Mental status change     Objective:     Vital Signs (Most Recent):  Temp: (!) 87.9 °F (31.1 °C)(christine reyes NP notified, applying warming blanket) (07/04/19 1300)  Pulse: (!) 58 (07/04/19 1700)  Resp: (!) 26 (07/04/19 1700)  BP: (!) 88/52 (07/04/19 1700)  SpO2: 97 % (07/04/19 1700) Vital Signs (24h Range):  Temp:  [87.9 °F (31.1 °C)-98 °F (36.7 °C)] 87.9 °F (31.1 °C)  Pulse:  [54-99] 58  Resp:  [18-30] 26  SpO2:  [95 %-100 %] 97 %  BP: ()/(34-65) 88/52   Weight: 80.9 kg (178 lb 5.6 oz)  Body mass index is 26.34 kg/m².      Intake/Output Summary (Last 24 hours) at 7/4/2019 1719  Last data filed at 7/4/2019 1700  Gross per 24 hour   Intake 3791.17 ml   Output 428 ml   Net 3363.17 ml       Physical Exam   Constitutional: He appears well-developed. He appears lethargic. He appears ill.   HENT:   Head: Normocephalic.   Eyes:   Eyes rolled back, unable to see pupils   Cardiovascular: Normal rate and intact distal pulses.   Pulmonary/Chest: Tachypnea noted. He has decreased breath sounds.   Abdominal: He exhibits distension.   Abdomen very firm and distended   Neurological: He appears lethargic. GCS eye subscore is 1. GCS verbal subscore is 2. GCS motor subscore is 4.   Skin: Skin is warm and dry.   Nursing note and vitals reviewed.      Vents:  Vent Mode: A/C (07/04/19 1339)  Ventilator Initiated: Yes (07/04/19 7267)  Set Rate:  26 bmp (07/04/19 1339)  Vt Set: 350 mL (07/04/19 1339)  Pressure Support: 0 cmH20 (07/04/19 1339)  PEEP/CPAP: 5 cmH20 (07/04/19 1339)  Oxygen Concentration (%): 60 (07/04/19 1700)  Peak Airway Pressure: 27 cmH2O (07/04/19 1339)  Plateau Pressure: 0 cmH20 (07/04/19 1339)  Total Ve: 9.43 mL (07/04/19 1339)  F/VT Ratio<105 (RSBI): (!) 71.82 (07/04/19 1339)  Lines/Drains/Airways     Central Venous Catheter Line                 Trialysis (Dialysis) Catheter 07/04/19 0907 right internal jugular less than 1 day          Drain                 Urethral Catheter 07/04/19 0835 Latex 16 Fr. less than 1 day          Airway                 Airway - Non-Surgical 07/04/19 0825 Endotracheal Tube less than 1 day          Peripheral Intravenous Line                 Peripheral IV - Single Lumen 07/04/19 0600 20 G Left Forearm less than 1 day         Peripheral IV - Single Lumen 07/04/19 0600 20 G Right Forearm less than 1 day         Peripheral IV - Single Lumen 07/04/19 1543 22 G Right Hand less than 1 day              Significant Labs:    CBC/Anemia Profile:  Recent Labs   Lab 07/04/19  0802 07/04/19  1209 07/04/19  1454   WBC 28.39* 21.95* 8.65   HGB 7.0* 6.9* 6.9*   HCT 26.9* 25.5* 26.5*    202 189   MCV 80* 78* 80*   RDW 17.5* 17.2* 17.3*        Chemistries:  Recent Labs   Lab 07/02/19  2312  07/04/19  0812 07/04/19  0951 07/04/19  1209   *   < > 137 131* 127*   K 5.9*   < > 6.7* 7.2* 6.9*   CL 93*   < > 107 100 96   CO2 23   < > 7* 7* 10*   BUN 33*   < > 45* 48* 46*   CREATININE 1.4   < > 2.3* 2.5* 2.5*   CALCIUM 8.5*   < > 7.3* 8.4* 7.7*   ALBUMIN 2.2*  --  1.5*  --  1.5*  1.5*   PROT 5.6*  --  3.4*  --  3.6*   BILITOT 4.7*  --  5.2*  --  5.8*   ALKPHOS 643*  --  456*  --  519*   *  --  1,158*  --  1,482*   *  --  3,860*  --  5,421*   MG  --   --  3.2*  --   --    PHOS  --   --  8.2*  --  8.2*    < > = values in this interval not displayed.       All pertinent labs within the past 24 hours have been  reviewed.    Significant Imaging: I have reviewed and interpreted all pertinent imaging results/findings within the past 24 hours.

## 2019-07-04 NOTE — SIGNIFICANT EVENT
Rapid Response Respiratory Therapy Note        Code Status: Prior   : 1965  Age: 53 y.o.  Weight:   Wt Readings from Last 1 Encounters:   19 80.9 kg (178 lb 5.6 oz)     Sex: male  Race:     or Other   White   Bed: 6087/6087 A:   MRN: [id number]  Time page Received: 0715  Time Rapid Response RT at Bedside: 07  Time Rapid Response RT left Bedside: 0750  Report given to: respiratory therapist in unit from SHRUTHI Lo RRT    SITUATION     Evaluated patient for: hypotension    BACKGROUND     Patient has a past medical history of Cancer.  Pulmonary Hx: None  Clinically Significant Surgical Hx: None    ASSESSMENT     Pulse: Pulse: 65 Respiratory rate: Resp: (!) 26 Temperature: Temp: 97.5 °F (36.4 °C) BP: BP: (!) 97/50 SpO2:SpO2: 100 %   Level of Consciousness: Level of Consciousness (AVPU): alert  Respiratory Effort: accessory muscle usage  Expansion/Accessory Muscle Usage: Expansion/Accessory Muscles/Retractions: expansion symmetric  All Lung Field Breath Sounds: All Lung Fields Breath Sounds: equal bilaterally  Cough Type:    Mobility at time of assessment: General Mobility: generalized weakness  O2 Device/Concentration: nasal cannula 2 lpm   Most recent blood gas:   Recent Labs     19  0734   PH 7.064*   PCO2 26.0*   PO2 141*   HCO3 7.4*   POCSATURATED 98   BE -23     Current Respiratory Care Orders: O2  NIPPV: No  Surgical airway: No  Vent: No  ETCO2 monitored:    Ambu at bedside: Ambu bag with the patient?: Yes, Adult Ambu    INTERVENTIONS/RECOMMENDATIONS   ?  Placed on nasal cannula 2 lpm Accessory muscle use noted.MAP 48-49. Abg with lytes and lactic were obtained. See chart results B Bakari was called to bedside by nurse     Disposition: Tx in ICU bed 6087.    FOLLOW-UP     Please call back the Rapid Response RT, Alexa Nicole, CRT at x [phone] for any questions or concerns.

## 2019-07-04 NOTE — HPI
"Mr. Jr Marsh is a 52 y/o male who presented to [facility] ED on 6/20/2019 with a two week history of abdominal pain, constipation, and bloating. He was found to have hepatic lesions on a CT scan concerning for HCC. On 6/24 a liver biopsy confirmed adenocarcinoma with possible colonic primary. There were plans for a prot placement and chemotherapy. On 7/2, the patient presented to ED after 5 episodes of "black' emesis and RUQ pain. He was admitted to the Oncology floor. 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 On the early morning of  7/4, the patient became hypotensive and was transferred to ICU with undifferentiated shock. He had a metabolic acidosis, lactate >12, leukocytosis, hyperkalemia, hypoglycemia, and BELINDA.  "

## 2019-07-04 NOTE — ASSESSMENT & PLAN NOTE
Intermittently hypoglycemic possibly due to sepsis   -- D10 infusion initially on admission to ICU-now dc'd  -- Accucheck Q1hr  -- trend BMP Q4

## 2019-07-04 NOTE — HPI
52 y/o Scottish  man stepped up to ICU on 7/4/2019 morning after presented with severe respiratory distress, metabolic acidosis (bicarb 7), and acute renal failure sCr up to 2.7 from baseline 0.9-1.1.  [place] [facility] [place]ry [place], recently diagnosed (6/24/19) with cancer by core liver bx unknown primary).  Early morning 7/4/2019, patient's respiratory status deteriorated, hypoxic, rapid response called, intubated on mechanical ventilation and on vasopressor support.  Noted to have hyperkalemia 6.7 from 5.5, hyperphosphatemia 8.2 from 2.2.    Nephrology consulted for evaluation of BELINDA, metabolic acidosis, and hyperkalemia.

## 2019-07-04 NOTE — PLAN OF CARE
Problem: Adult Inpatient Plan of Care  Goal: Plan of Care Review  Outcome: Ongoing (interventions implemented as appropriate)  POC reviewed with patient and friend, questions answered and concerns addressed. VS stable, ambulating with SBA in room, c/o nausea-reglan given with moderate relief obtained. Denies pain, no SOB observed; tolerating CL diet thus far. In no acute distress; will continue to monitor.

## 2019-07-04 NOTE — CARE UPDATE
Suctioned brown tan secretions from patient ETT on first pass once suctioned again no secretions came up through tube.

## 2019-07-04 NOTE — PROCEDURES
"Jr Marsh is a 53 y.o. male patient.    Temp: 97.5 °F (36.4 °C) (07/04/19 0458)  Pulse: 65 (07/04/19 0831)  Resp: (!) 22 (07/04/19 0831)  BP: (!) 86/44 (07/04/19 0831)  SpO2: 100 % (07/04/19 0831)  Weight: 80.9 kg (178 lb 5.6 oz) (07/03/19 0536)  Height: 5' 9" (175.3 cm) (07/02/19 2057)       Intubation  Date/Time: 7/4/2019 8:36 AM  Location procedure was performed: [facility] ([facility])  Performed by: Serafin Villegas MD  Authorized by: Serfain Villegas MD   Consent Done: Emergent Situation  Indications: respiratory failure and  airway protection  Intubation method: direct  Patient status: paralyzed (RSI)  Preoxygenation: BVM  Sedatives: etomidate  Paralytic: rocuronium  Laryngoscope size: Mac 4  Tube size: 8.0 mm  Tube type: cuffed  Number of attempts: 2  Ventilation between attempts: BVM  Cricoid pressure: yes  Cords visualized: yes  Post-procedure assessment: chest rise and CO2 detector  Breath sounds: clear and equal  Cuff inflated: yes  ETT to teeth: 24 cm  Tube secured with: ETT landis  Patient tolerance: Patient tolerated the procedure well with no immediate complications  Complications: No  Specimens: No          Serafin Villegas  7/4/2019  "

## 2019-07-04 NOTE — ASSESSMENT & PLAN NOTE
6/20/2019- CT ab/pelv is concern for metastatic HCC vs cholangiocarcinoma   -- Tumor markers- AFP of 974, CA 19-9 > 04321, CEA of 158.3 on that admission  - C-scope l2018 negative for malignancy (per patient report to Hospital Medicine team)  - IR liver biopsy done on 6/24, path showing adenocarcinoma ( possibly of colonic origin)  - LFTs worsening  - Discussed with Oncology. No active treatment at this time. Chemo may be considered as outpatient

## 2019-07-04 NOTE — ASSESSMENT & PLAN NOTE
Unclear etiology of blood loss  -- No Ng/OG due to concern for bleeding of esophageal mass seen on EGD  -- 1 unit PRBCs today  -- trend CBC  -- transfuse for hgb<7

## 2019-07-04 NOTE — ASSESSMENT & PLAN NOTE
Patient hyperkalemic on admission  -- Shifted with Insulin and dextrose several times  -- EKG  -- Lasix 80 mg x1  -- Trialysis placed for CRRT   -- Trend potasium q4

## 2019-07-04 NOTE — EICU
08:48 Fiona WInterbottom NP at bedside to insert Trialysis catheter emergently. /51, HT 69, SP02 100%  09:10 trialysis line in place CXR ordered.

## 2019-07-04 NOTE — PROGRESS NOTES
07/04/19 0458   Vital Signs   Temp 97.5 °F (36.4 °C)   Temp src Oral   Pulse 82   Heart Rate Source Monitor   Resp 20   SpO2 98 %   Pulse Oximetry Type Intermittent   O2 Device (Oxygen Therapy) room air   BP (!) 94/55   MAP (mmHg) 69   BP Location Left arm   BP Method Automatic   Patient Position Sitting   Dr Hdez aware of BP 90/50's. All other vitals stable. No orders at this time. Will continue to monitor.

## 2019-07-04 NOTE — ASSESSMENT & PLAN NOTE
Uric acid elevated up to 15.3  -- no indication for Rasburicase per Oncology  -- continue to monitor

## 2019-07-04 NOTE — ASSESSMENT & PLAN NOTE
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 One non-bleeding small duodenal ulcer with no stigmata of bleeding. Medium-sized hiatal hernia.  -- will continue to monitor

## 2019-07-04 NOTE — CARE UPDATE
Pt intubated at bedside by MD with 8.0 ETT secured 24 cm at the lips. Tube placement confirmed by auscultation and ETCO2 colorimetric device. Vent settings can be found in flowsheets. SpO2 100% and pt tolerating well. Will continue to monitor closely.

## 2019-07-04 NOTE — ASSESSMENT & PLAN NOTE
Unclear etiology  -- Treat for septic shock  -- Fluid resuscitated x 3L  -- Levophed for MAP>65  -- Broad spectrum ABX (Zosyn, Vanc)  -- Blood cx  -- trend CBC

## 2019-07-04 NOTE — ASSESSMENT & PLAN NOTE
Intubated for airway protection and work of breathing needed to compensate for metabolic acidosis  -- See respiratory failure

## 2019-07-04 NOTE — PLAN OF CARE
Problem: Adult Inpatient Plan of Care  Goal: Plan of Care Review  Outcome: Ongoing (interventions implemented as appropriate)  Plan of care reviewed with the patient at the beginning of the shift. Pt admitted for GI bleed. Protonix scheduled. CBC monitored q 6hr and have been stable. This mornings labs are pending. BP 's/50's this morning. Pt asymptomatic. Stat labs done. Abdominal pain managed with Oxy and MS Contin. Nausea managed with Zofran. Abdomen is distended and firm. Xray done this morning. Pt has not had a BM in 3 days. He is on a bowel regimen. BLE edema +3. Fall precautions maintained. Pt remained free from falls and injury this shift. Bed locked in lowest position, side rails up x2, call light within reach. Instructed pt to call for assistance as needed. Pt verbalized understanding. Will continue to monitor.

## 2019-07-04 NOTE — CODE/ RAPID DOCUMENTATION
"RAPID RESPONSE NURSE NOTE     Admit Date: 2019  LOS: 1  Code Status: Prior   Date of Consult: 2019  : 1965  Age: 53 y.o.  Weight:   Wt Readings from Last 1 Encounters:   19 80.9 kg (178 lb 5.6 oz)     Sex: male  Race:     or Other   White   Bed: 60/6087 A:   MRN: [id number]  Time Rapid Response Team page Received: 0715  Time Rapid Response Team at Bedside: 0717  Time Rapid Response Team left Bedside: 0755  Was the patient discharged from an ICU this admission?   no  Was the patient discharged from a PACU within last 24 hours?  yes  Did the patient receive conscious sedation/general anesthesia within last 24 hours?  yes  Was the patient in the ED within the past 24 hours?  yes  Was the patient started on NIPPV within the past 24 hours?  no  Did this progress into an ARC or CPA:  no  Attending Physician: Colin Ruggiero MD     Primary Service[place] [facility] [place] [facility]  Consult Requested By: Colin Ruggiero MD     SITUATION     Reason for Call: Hypotension  Called to evaluate the patient for Circulatory    BACKGROUND     Why is the patient in the hospital?: Hematemesis    Patient has a past medical history of Cancer.    ASSESSMENT/INTERVENTIONS     BP (!) 86/44   Pulse 65   Temp 97.5 °F (36.4 °C) (Oral)   Resp (!) 22   Ht 5' 9" (1.753 m)   Wt 80.9 kg (178 lb 5.6 oz)   SpO2 100%   BMI 26.34 kg/m²     What did you find:     RRT RN called per ONC RNMan. Pt hypotensive, NIBP SBP 60s, sustaining. Upon entering room, pt connected to VS monitor. ADIN Watson MD at bedside. NIBP SBP 60s, MAP 48-49, sustaining. HR 60-70s, sustaining. SpO2 ~95% on RA. DOROTEO Villegas MD notified and called to bedside. Breath sounds clear, diminished noted equally/bilaterally. S1, S2 noted upon auscultation. Accessory muscle used noted. WOB increased. ABG with lytes, lactic acid and crit obtained. Pt placed on 2L NC per RT. Labs sent. 1L NS bolus administered per MD order. Pt arouses to voice, gentle " shaking. Oriented x4. Drowsy. Pt transferred to [facility] Room 6087 per RRT RN.      RECOMMENDATIONS     We recommend: ICU admission, administer blood, ABX, sepsis work up, CT abdomen.    FOLLOW-UP/CONTINGENCY PLAN       Call the Rapid Response Nurse, Karmen Julien RN at x [phone] for additional questions or concerns.    PHYSICIAN ESCALATION     Orders received and case discussed with Dr. DOROTEO Villegas.    Disposition: Tx in ICU bed 6087.

## 2019-07-04 NOTE — CONSULTS
[facility]  Nephrology  Consult Note    Patient Name: Jr Marsh  MRN: [id number]  Admission Date: 7/2/2019  Hospital Length of Stay: 1 days  Attending Provider: Ronald Weaver MD   Primary Care Physician: Primary Doctor No  Principal Problem:Hematemesis    Inpatient consult to Nephrology  Consult performed by: Prakash Sales MD  Consult ordered by: Antonieta Rosenbaum NP        Subjective:     HPI: 52 y/o [place]  man stepped up to ICU on 7/4/2019 morning after presented with severe respiratory distress, metabolic acidosis (bicarb 7), and acute renal failure sCr up to 2.7 from baseline 0.9-1.1.  He had originally been admitted to [facility] [place] due to N/V black tarry emesis, recently diagnosed (6/24/19) with cancer by core liver bx unknown primary).  Early morning 7/4/2019, patient's respiratory status deteriorated, hypoxic, rapid response called, intubated on mechanical ventilation and on vasopressor support.  Noted to have hyperkalemia 6.7 from 5.5, hyperphosphatemia 8.2 from 2.2.    Nephrology consulted for evaluation of BELINDA, metabolic acidosis, and hyperkalemia.    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       Past Surgical History:   Procedure Laterality Date    KNEE SURGERY      SHOULDER SURGERY         Review of patient's allergies indicates:  No Known Allergies  Current Facility-Administered Medications   Medication Frequency    calcium gluconate 1g in dextrose 5% 100mL (ready to mix system) Q10 Min PRN    chlorhexidine 0.12 % solution 15 mL BID    dextrose 10 % infusion Continuous    dextrose 10% (D10W) Bolus PRN    dextrose 10% (D10W) Bolus PRN    dextrose 10% (D10W) Bolus PRN    fentaNYL injection 50 mcg Q15 Min PRN    Followed by    [START ON 7/6/2019] fentaNYL injection 50 mcg Q1H PRN    metoclopramide HCl tablet 10 mg Q6H PRN    norepinephrine 4 mg in dextrose 5% 250 mL infusion  (premix) (titrating) Continuous    norepinephrine bitartrate-D5W 4 mg/250 mL (16 mcg/mL) infusion Soln     ondansetron disintegrating tablet 8 mg Q12H PRN    ondansetron injection 4 mg Q6H PRN    pantoprazole injection 40 mg BID    phenylephrine HCl in 0.9% NaCl 1 mg/10 mL (100 mcg/mL) syringe 100 mcg PRN    piperacillin-tazobactam 4.5 g in sodium chloride 0.9% 100 mL IVPB (ready to mix system) Q8H    propofol (DIPRIVAN) 10 mg/mL infusion     propofol (DIPRIVAN) 10 mg/mL infusion Continuous    sodium bicarbonate 1 mEq/mL (8.4 %) 150 mEq in dextrose 5 % 1,000 mL infusion Continuous    sodium bicarbonate 4% injection 100 mEq Once    sucralfate 100 mg/mL suspension 1 g Q6H    ursodiol capsule 300 mg BID    vancomycin 1.5 g in dextrose 5 % 250 mL IVPB (ready to mix) Once     Family History     None        Tobacco Use    Smoking status: Former Smoker    Smokeless tobacco: Never Used   Substance and Sexual Activity    Alcohol use: Not Currently     Comment: rarely    Drug use: Never    Sexual activity: Not on file     Review of Systems   Unable to perform ROS: Intubated     Objective:     Vital Signs (Most Recent):  Temp: 97.5 °F (36.4 °C) (07/04/19 0458)  Pulse: 66 (07/04/19 0939)  Resp: (!) 26 (07/04/19 0939)  BP: (!) 99/52 (07/04/19 0939)  SpO2: 100 % (07/04/19 0939)  O2 Device (Oxygen Therapy): ventilator (07/04/19 0939) Vital Signs (24h Range):  Temp:  [97.5 °F (36.4 °C)-99 °F (37.2 °C)] 97.5 °F (36.4 °C)  Pulse:  [] 66  Resp:  [16-30] 26  SpO2:  [97 %-100 %] 100 %  BP: ()/(34-71) 99/52     Weight: 80.9 kg (178 lb 5.6 oz) (07/03/19 0536)  Body mass index is 26.34 kg/m².  Body surface area is 1.98 meters squared.    I/O last 3 completed shifts:  In: 2980 [P.O.:730; IV Piggyback:2250]  Out: 450 [Urine:450]    Physical Exam   Constitutional: He appears cachectic. He is cooperative. He has a sickly appearance. He appears ill.   Critically ill, sedated on mechanical ventilation   Eyes:  Conjunctivae and EOM are normal.   Neck: Normal range of motion. Neck supple.   Cardiovascular: Normal rate, regular rhythm and normal heart sounds.   Pulmonary/Chest: Effort normal and breath sounds normal. No stridor. No respiratory distress. He has no wheezes. He has no rales.   Vent transmitted breath sounds   Abdominal: He exhibits distension and ascites. Bowel sounds are decreased. There is hepatomegaly. There is tenderness.   Musculoskeletal: Normal range of motion. He exhibits edema. He exhibits no tenderness.   Neurological: He is alert.   Skin: Skin is warm and dry.   Psychiatric: He has a normal mood and affect. His behavior is normal. Judgment and thought content normal.   Nursing note and vitals reviewed.      Significant Labs:  CBC:   Recent Labs   Lab 07/04/19  0802   WBC 28.39*   RBC 3.36*   HGB 7.0*   HCT 26.9*      MCV 80*   MCH 20.8*   MCHC 26.0*     CMP:   Recent Labs   Lab 07/04/19  0812   GLU 37*   CALCIUM 7.3*   ALBUMIN 1.5*   PROT 3.4*      K 6.7*   CO2 7*      BUN 45*   CREATININE 2.3*   ALKPHOS 456*   ALT 1,158*   AST 3,860*   BILITOT 5.2*     All labs within the past 24 hours have been reviewed.    Significant Imaging:  CXR personally reviewed.    Assessment/Plan:     CHAGO (acute kidney injury)  Patient with acute kidney injury likely from ischemic ATN from hypotension, drops in Hb and hemodynamic instability vs TLS    Plan:  - Bicarb 100 mEq IV push (bicarb deficit ~450 mEq)  - Bicarb drip 150 mEq/D5W1L to run at 150 mL/hr  - Furosemide  mg IV x 1 dose to help with hyperkalemia   - Renal US  - Strict I/Os and chart  - Renal function panel q 4 hrs to monitor K and kidney function  - MAP > 65  - Hb > 7 gm/dL  - Avoid kayaxelate in patient with intraabdominal GI cancer lesions  - Will follow closely    Acute hyperkalemia  - refer to [facility]      Liver metastases  - Managed by primary team    Adenocarcinoma  - Managed by primary team        Thank you for your consult. I  will follow-up with patient. Please contact us if you have any additional questions.    Prakash Sales MD  Nephrology  [facility]

## 2019-07-04 NOTE — ASSESSMENT & PLAN NOTE
-- Intubated 7/4  -- Sedated for prevention of line removal  -- sedation weaned for RASS of zero  -- ABG prn  -- Wean ventilator as tolerated for SaO2 >92%  -- Daily SAT/SBT

## 2019-07-04 NOTE — ASSESSMENT & PLAN NOTE
"Patient increasing altered since 7/3 likely due to worsening acidosis  -- Answers "yes" to all questions and doesn't open his eyes  -- agitated prior to intubation, therefore sedated with Fentanyl and Propofol  -- Sedation off after CT scan in pm of 7/4 however patient remains minimally responsive  -- Neuro checks at least Q4  -- Will continue to monitor and consider CTH and EEG if condition does not improve      "

## 2019-07-05 NOTE — PLAN OF CARE
Date of service: 7/4/2019 at 4:10 PM    Patient seen and examined at bedside. Acute change in status.     53 M with adenocarcinoma mets to liver likely esophageal primary presented to [facility] [place] with hematemesis    On 7/4/2019 AM at around 5 AM patient became hypotensive with SBP in 90s and later down trended to SBP of 70s. Admitted to ICU for hypotension and acute hypoxic respiratory failure. He has severe lactic acidosis > 12. CT abdomen did not reveal any bowel ischemia or perforation. Suspicion of aspiration pneumonia for acute hypoxic respiratory failure.     On review of labs, he has signs of acute shock liver with AST >5000, T.biliaround 6, alk phos > 500 and his INR of 3.7 with coagulopathy. He also has shock kidney with acute kidney failure with creatinine of 2.5. His uric acid is 15.3. Hem/Onc called for role of Rasburicase in this setting    Plan  1. Hypoxic respiratory failure, currently sedated and Intubated. Ventilatory support  2. Shock, suspected from sepsis on broad spectrum antibiotics and pressor support  3. Shock liver and kidney with acute liver and kidney failure, with CRRT and supportive treatments  4. Rasburicase not indicated as this is less likely a tumor lysis syndrome. Tumor lysis syndrome is commonly seen in Lymphomas and leukemias and occasionally in small cell lung but not in GI malignancies as in this case. Recommend supportive treatments with CRRT and electrolyte corrections as needed.     Updated the partner at the bedside. Answered all his questions to his satisfaction.     Spoke to Gisselle and updated our plan of care    Plan of care discussed with Dr. Thong Holden MD PGY V  Hematology/Oncology

## 2019-07-05 NOTE — SIGNIFICANT EVENT
Critical Care Medicine                                                                                   Death Note      Called to bedside by patient's nurse.   Nursing supervisor notified.   Family (brother) at bedside.   Champlain called.    Patient is not responding to verbal or tactile stimuli.   Patient does not have a pupillary or corneal reflex.   Patient's pupils are fixed and dilated.   No heart or breath sounds on auscultation.   No respirations.   No palpable pulses.     Time of death @0045 on 7/5/19.     Cause of Death Liver Failure.     Circulatory failure 2/2 Liver Failure.  Complicated by hyperkalemia and coagulopathy.   Acidemia with LA >12.                  David Pitt MD  Internal Medicine PGY-2  [phone]

## 2019-07-05 NOTE — PROCEDURES
"Jr Marsh is a 53 y.o. male patient.    Temp: (!) 91 °F (32.8 °C) (19)  Pulse: 68 (19)  Resp: (!) 26 (19)  BP: (!) 76/39 (19)  SpO2: 97 % (19)  Weight: 80.9 kg (178 lb 5.6 oz) (19)  Height: 5' 9" (175.3 cm) (19)       Arterial Line  Date/Time: 2019 12:49 AM  Performed by: David Pitt MD  Authorized by: David Pitt MD   Consent Done: Yes  Consent given by: tg  Relevant documents: relevant documents present and verified  Test results: test results available and properly labeled  Site marked: the operative site was marked  Patient identity confirmed: MRN, name, provided demographic data and   Time out: Immediately prior to procedure a "time out" was called to verify the correct patient, procedure, equipment, support staff and site/side marked as required.  Preparation: Patient was prepped and draped in the usual sterile fashion.  Indications: multiple ABGs and hemodynamic monitoring  Location: right radial  Patient sedated: no  Needle gauge: 20  Number of attempts: 1  Complications: No  Estimated blood loss (mL): 2  Post-procedure: line sutured and dressing applied  Post-procedure CMS: normal  Comments:   Required for high dose pressors, and ABG monitoring.             David Pitt MD  Internal Medicine   [phone]      David Pitt  2019  "

## 2019-07-05 NOTE — PROGRESS NOTES
CRRT clotted again after 1hr 30mins of restart. High venous pressure and TMP (380mmhg). Unable to return venous line. Rinsed back arterial.

## 2019-07-05 NOTE — NURSING
Patient with increasing pressor requirements since beginning of shift, and not tolerating CRRT r/t clotting off. Vaso added with levo around 2100. Pressor requirements increased ~1130 and continued to need increasing. MD came to bedside to insert arterial line. Patient maxed out on levo at 3, vaso at 0.04, bicarb at 150. Patient went pulseless around 0035, 1 epi, calcium chloride, and one amp of bicarb given. Decision made not to do chest compressions, brother remained at bedside. Time of death called at 0045 by Dr. Pitt. Dr. Pitt and Dr. Pizano, as well as patient's brother Brayden, were present at bedside during code.

## 2019-07-05 NOTE — PROGRESS NOTES
Late entry. Care provided late yesterday evening.    Pt with worsening encephalopathy. LFTs continued to worsen. Ammonia extremely elevated at 279. Kept clotting off on dialysis, possibly due to DIC. Couldn't get potassium down because we couldn't get him to tolerate HD long enough. Pt with worsening vasopressor requirement, lactic acidosis. Met with patient's parents, brother, & friend. Unfortunately, he appears to be dying from liver failure. Family & friend in shock. We continued all supportive measures, but when cardiac arrest became imminent, I advised against CPR.    Aayush Pizano MD

## 2019-07-05 NOTE — CHAPLAIN
received a call. Pt is state of comma, parents are present (father & mother). Pt. Received the Sacrament of the Sick. Parents w/ sad and grief.  Pt kidney failure and liver. Pt in state of dying,uncertanity of living.

## 2019-07-05 NOTE — NURSING
Pt belongings brought to room 6087 - consisted of a small zippered bag from his top drawer / 1 pair of glasses & 1 cell phone - no phone  found in his room 848 - placed in plastic bag and delivered to counter inside room 6087

## 2019-07-05 NOTE — PROGRESS NOTES
CRRT clotted after 30mins of start earlier. Restarted with  3K bath, patient with K of 6.7. Unable to aspirate from venous port.  UF set to 200ml/hr. Alarms set and all lines secured.

## 2019-07-06 LAB
BLD PROD TYP BPU: NORMAL
BLD PROD TYP BPU: NORMAL
BLOOD UNIT EXPIRATION DATE: NORMAL
BLOOD UNIT EXPIRATION DATE: NORMAL
BLOOD UNIT TYPE CODE: 7300
BLOOD UNIT TYPE CODE: 7300
BLOOD UNIT TYPE: NORMAL
BLOOD UNIT TYPE: NORMAL
CODING SYSTEM: NORMAL
CODING SYSTEM: NORMAL
DISPENSE STATUS: NORMAL
DISPENSE STATUS: NORMAL
TRANS ERYTHROCYTES VOL PATIENT: NORMAL ML
TRANS ERYTHROCYTES VOL PATIENT: NORMAL ML

## 2019-07-07 NOTE — DISCHARGE SUMMARY
"Death Note  Critical Care Medicine      Admit Date: 2019    Date of Death: 2019    Time of Death: 00:45    Attending Physician: Ronald Weaver MD     Principal Diagnoses: Shock    Preliminary Cause of Death: Shock    Secondary Diagnoses:   Active Hospital Problems    Diagnosis  POA    *Shock [R57.9]  No    BELINDA (acute kidney injury) [N17.9]  No    Acute metabolic encephalopathy [G93.41]  Clinically Undetermined    On mechanically assisted ventilation [Z99.11]  Not Applicable    Acute hypoxemic respiratory failure [J96.01]  Clinically Undetermined    Hypoglycemia [E16.2]  Clinically Undetermined    Hyperuricemia [E79.0]  Clinically Undetermined    Elevated liver enzymes [R74.8]  Yes    Metabolic acidosis [E87.2]  Unknown    Hematemesis [K92.0]  Yes    Anemia due to blood loss, acute [D62]  Unknown    Hyperkalemia [E87.5]  Unknown    Hypoalbuminemia [E88.09]  Yes    Neoplasm related pain [G89.3]  Yes    Liver metastases [C78.7]  Yes    Adenocarcinoma [C80.1]  Yes    Leukocytosis [D72.829]  Yes      Resolved Hospital Problems   No resolved problems to display.        Discharged Condition:     HPI:  Mr. Jr Marsh is a 54 y/o male who presented to [facility] ED on 2019 with a two week history of abdominal pain, constipation, and bloating. He was found to have hepatic lesions on a CT scan concerning for HCC. On  a liver biopsy confirmed adenocarcinoma with possible colonic primary. There were plans for a prot placement and chemotherapy. On , the patient presented to ED after 5 episodes of "black' emesis and RUQ pain. He was admitted to the Oncology floor. 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 On the early morning of  , the patient became hypotensive and was transferred to ICU with " undifferentiated shock. He had a metabolic acidosis, lactate >12, leukocytosis, hyperkalemia, hypoglycemia, and BELINDA.  Consultations were held with the family regarding the patient's expected poor prognosis.LFTs continued to worsen. Ammonia extremely elevated. CRRT kept clotting off, possibly due to DIC. Couldn't get potassium down because we couldn't get him to tolerate CRRT long enough. Worsening vasopressor requirement, lactic acidosis. Met with patient's parents, brother, & friend. Unfortunately, the patient was dying from liver failure. Family & friend in shock. We continued all supportive measures, but when cardiac arrest became imminent, CPR was not recommended.    The patient was subsequently declared dead at 00:45 7/5/2019    Fiona Winterbottom APRN, CNS  Critical Care Medicine  [phone]

## 2019-07-08 LAB
BACTERIA BLD CULT: NORMAL
BACTERIA BLD CULT: NORMAL

## 2019-07-09 LAB
BACTERIA BLD CULT: NORMAL
BACTERIA BLD CULT: NORMAL

## 2019-07-13 NOTE — PHYSICIAN QUERY
PT Name: Jr Marsh  MR #: [id number]     Physician Query Form - Diagnosis Clarification      CDS: hDeeraj WYLIE,RN        Contact information:[phone]    This form is a permanent document in the medical record.     Query Date: July 12, 2019    By submitting this query, we are merely seeking further clarification of documentation.  Please utilize your independent clinical judgment when addressing the question(s) below.     The medical record contains the following:      Findings Supporting Clinical Information Location in Medical Record                   DIC Coagulopathy  - INR of 1.8 initially, likely secondary to liver disease  - agree with one dose of 10 mg IV Vitamin K prior to GI procedure    CRRT kept clotting off, possibly due to DIC.    Hgb=9.2-->8.2-->6.9--->8.4---->9.2  Hct=32.3->30.3->25.5->30.5-->33.0  Pcrtlsexu=547---->371-->418-->202-->35--->27  PT=17.3-->35.9  INR=1.8-->3.7  Fibrinogen= <70   Alk Klzp=713-->456-->659-->766-->1023  Total Bilirubin=5.2--5.8-->6.4-->6.8-->7.2  ZYK=961-->3860-->5421-->7422-->8244-->9113  MEV=8649-->1482-->1903-->2255      Phytonadione Vitamin K 10 mg Intravenous Once 1 dose ( 7/3/19@1215 given)      Transfuse Platelets 1 Dose  Transfuse RBC 1 Unit    On review of labs, he has signs of acute shock liver        Cause of Death Liver Failure.    Circulatory failure 2/2 Liver Failure.   Complicated by hyperkalemia and coagulopathy.    Acidemia with LA >12.    7/3/19 Hematology and Oncology Consults      7/5/19 Critical Care Medicine Discharge Summary    7/2/----> 7/5 Labs            7/3/19 MAR  7/2---->7/5 Transfusion Record  7/5/19 Hematology and Oncology Plan of Care     7/5/19 Critical Care Significant Event     Please clarify if the ___DIC________________________ diagnosis has been:    [ x ] Ruled In   [  ] Ruled Out   [  ] Other/Clarification of findings (please specify):     [  ] Clinically undetermined     Please document in your progress notes daily for  the duration of treatment, until resolved, and include in your discharge summary.

## 2019-07-15 NOTE — PHYSICIAN QUERY
PT Name: Jr Marsh  MR #: [id number]     Physician Query Form - Diagnosis Clarification      CDS: Dheeraj WYLIE,RN        Contact information:[phone]    This form is a permanent document in the medical record.     Query Date: July 15, 2019    By submitting this query, we are merely seeking further clarification of documentation.  Please utilize your independent clinical judgment when addressing the question(s) below.     The medical record contains the following:      Findings Supporting Clinical Information Location in Medical Record                 Aspiration Pneumonia Acute respiratory failure- evidence of aspiration on CT    Lungs/airways: Endotracheal tube terminates approximately 3 cm above the zay.  The visualized airways are patent.  There is new focal consolidation within the bilateral lower lung zones, greater on the left which is favored to represent aspiration given the retained material within the esophagus seen on CTA chest dated 07/01/2019.   Cardiac silhouette is enlarged.  Pulmonary vascularity is increased.  There is mild diffusely increased interstitial opacification, which could be due to edema, with atelectasis at the lung bases; this may be exaggerated by submaximal inspiration, but could represent a slight interval change since the prior study.  There is no large pleural effusion or pneumothorax.  Visualized osseous structures are stable  Piperacillin-tazobactam 4.5 g in sodium chloride 0.9% 100 ml IVPB Intravenous every 8 hours (7/4/19@2128 given)   7/4/19 Critical Care Medicine H&P    7/419 CT Chest Abdoment Pelvis Without Contrast         7/4/19 Chest X-Ray AP Portable          7/4/19 MAR     Please clarify if the _Aspiration Pneumonia__________________________ diagnosis has been:    [ x ] Ruled In   [  ] Ruled Out   [  ] Other/Clarification of findings (please specify):     [  ] Clinically undetermined     Please document in your progress notes daily for the duration of  treatment, until resolved, and include in your discharge summary.

## 2019-07-15 NOTE — PHYSICIAN QUERY
PT Name: Jr Marsh  MR #: [id number]    Physician Query Form - Respiratory Condition Clarification      CDS: Dheeraj WYLIE,RN     Contact information:[phone]    This form is a permanent document in the medical record.    Query Date: July 15, 2019    By submitting this query, we are merely seeking further clarification of documentation. Please utilize your independent clinical judgment when addressing the question(s) below.    The Medical record contains the following   Indicators   Supporting Clinical Findings Location in Medical Record   x   SOB, PAUL, Wheezing, Productive Cough, Use of Accessory Muscles, etc. Pt reports some SOB    Tachypnea noted. He has decreased breath sounds.     7/2/19 ED Triage Notes     7/4/19 Critical Care Medicine H&P   x   Acute/Chronic Illness He had a metabolic acidosis, lactate >12, leukocytosis, hyperkalemia, hypoglycemia, and BELINDA, septic shock, acute metabolic encephalopathy, acute respiratory failure with hypoxemic, liver adenocarcinoma, esophageal tumor, acute blood loss anemia       7/4/19 Critical Care Medicine H&P      Radiology Findings Lungs/airways: Endotracheal tube terminates approximately 3 cm above the zay.  The visualized airways are patent.  There is new focal consolidation within the bilateral lower lung zones, greater on the left which is favored to represent aspiration given the retained material within the esophagus seen on CTA chest dated 07/01/2019.  It is difficult to exclude small amount of pleural fluid.  No pneumothorax.          7/4/19 CT Chest  Abdoment Pelvis Without Contrast   x   Respiratory Distress or Failure  Acute Respiratory Failure with hypoxemic     52 y/o [place] man stepped up to ICU on 7/4/2019 morning after presented with severe respiratory distress   7/4/19 Critical Care Medicine H&P      7/4/19 Nephrology Consults   x   Hypoxia or Hypercapnia     x   RR         ABGs         O2 sat RR= 12---->16--->17-->18-->26    PH=   7.064--->7.160-->7.145  PCO2=26.0-->44.2-->48.3  JF5=444--->53--->52  HCO3= 7.4-->15.7-->16.6  Sats O2=98-->77-->75   7/2-------->7/5 Vital Signs Flowsheet       7/4----------->7/5 ABGs       x   BiPAP/Intubation Related Problems  On mechanically assisted ventilation  Intubated for airway protection and work of breathing needed to compensate for metabolic acidosis      7/4/19 Assessment & Plan  Note Winterbottom APRN, CNS      Supplemental O2        Home O2, Oxygen Dependence        Treatment        Other       Respiratory failure can be acute, chronic or both. It is generally further specified as hypoxic, hypercapnic or both. Lastly, it is important to identify an etiology, if known or suspected.   References::  https://www.acphospitalist.org/archives/2013/10/coding.htm http://[employer]/acute-respiratory-failure-know     The clinical guidelines noted below are only system guidelines, and do not replace the providers clinical judgment.    Provider, please specify diagnosis or diagnoses associated with above clinical findings.   [x   ] Acute Respiratory Failure with Hypoxia - ABG pO2 < 60 mmHg or O2 sat of 88% on RA and respiratory symptoms documented   [   ] No Respiratory Failure, Maintained on Vent for Routine Care or Airway Protection -  purposely intubated for airway protection (e.g.: angioedema, stroke, trauma); without meeting the criteria for respiratory failure.    [   ] Other Respiratory Diagnosis (please specify): _________________________________   [   ]  Clinically Undetermined       Please document in your progress notes daily for the duration of treatment until resolved and include in your discharge summary.

## 2024-09-24 NOTE — PLAN OF CARE
Problem: Adult Inpatient Plan of Care  Goal: Plan of Care Review  Outcome: Ongoing (interventions implemented as appropriate)  Has mild fever overnight, denies pain until this morning at 5am, complained of abdominal pain that radiates to the back. Given IV morphine.   Placed on NPO after midnight, for CT chest this morning. VS stable. Blood sugar checked, 96. Awaiting hepatology and oncology consult. Will continue to monitor.        4 (moderate pain)

## 2024-12-20 NOTE — NURSING
Patient hypotensive and tachypneic on first rounds with respirations 30/min shallow and using accessory muscles to breathe. Rapid nurse called and came to bedside; IM 3 MD also at bedside. Rapid response initiated @ 0716, patient bolused with 2L NS, ABG'S done, O2 placed at 2LPM N/C.for symptomatic relief.Tele-NSR, no ectopy. B/P came up to 109/57, patient transferred to [facility]. Report given to Gilberto at bedside.    None Done